# Patient Record
Sex: FEMALE | Race: WHITE | NOT HISPANIC OR LATINO | Employment: OTHER | URBAN - METROPOLITAN AREA
[De-identification: names, ages, dates, MRNs, and addresses within clinical notes are randomized per-mention and may not be internally consistent; named-entity substitution may affect disease eponyms.]

---

## 2019-04-30 ENCOUNTER — OFFICE VISIT (OUTPATIENT)
Dept: GASTROENTEROLOGY | Facility: CLINIC | Age: 79
End: 2019-04-30

## 2019-04-30 VITALS
TEMPERATURE: 98.5 F | DIASTOLIC BLOOD PRESSURE: 66 MMHG | WEIGHT: 109.8 LBS | HEIGHT: 62 IN | BODY MASS INDEX: 20.2 KG/M2 | SYSTOLIC BLOOD PRESSURE: 118 MMHG

## 2019-04-30 DIAGNOSIS — Z87.19 HISTORY OF SMALL BOWEL OBSTRUCTION: ICD-10-CM

## 2019-04-30 DIAGNOSIS — K59.04 CHRONIC IDIOPATHIC CONSTIPATION: Primary | ICD-10-CM

## 2019-04-30 DIAGNOSIS — R14.0 ABDOMINAL BLOATING: ICD-10-CM

## 2019-04-30 PROCEDURE — 99204 OFFICE O/P NEW MOD 45 MIN: CPT | Performed by: INTERNAL MEDICINE

## 2019-04-30 RX ORDER — MULTIPLE VITAMINS W/ MINERALS TAB 9MG-400MCG
1 TAB ORAL DAILY
COMMUNITY

## 2019-04-30 NOTE — PROGRESS NOTES
"Chief Complaint   Patient presents with   • Constipation   • Abdominal Pain       Subjective     HPI    Rosa Salzaar is a 78 y.o. female with a past medical history noted below who presents for evaluation of abdominal pain and constipation.  She reports symptoms present for years.  Varies between predominantly having no BMs to having up to 6 BMs in a day.  She does take bisacodyl and herbal laxative daily.  Constipation has apparently worsened over the past month.  She says she has not \"eliminated in 4 weeks.\"  She has only passed small amounts of stool for the past few weeks and feels incomplete evacuation.  She feels bloated, full, and \"like a rotten pumpkin.\"  Symptoms are associated with poor appetite, generalized abdominal pain.  There is no nausea or vomiting.  She is able to eat hamburgers, pizza, canned peaches however.    She has had multiple surgeries in the past including surgery for intestinal obstruction-- all were done in [place].     2002 she had a colonoscopy and had 2 perforations.  This was in [place].    No family history of colon cancer or colon polyps.    No smoking, no booze. She is retired from teaching.          Past Medical History:   Diagnosis Date   • Basal cell carcinoma 2013, 2014   • Bowel obstruction (CMS/HCC)    • History of nephrolithiasis    • History of small bowel obstruction    • Hypercalcemia    • Hyperparathyroidism (CMS/HCC)    • Kidney stones    • Kidney stones    • Osteopenia    • Thyroid disease          Current Outpatient Medications:   •  acetaminophen (TYLENOL) 500 MG tablet, Take 500 mg by mouth every 6 (six) hours as needed for mild pain (1-3)., Disp: , Rfl:   •  Bisacodyl (LAXATIVE PO), Take 2 capsules by mouth daily. [place] HERBAL LAXATIVE, Disp: , Rfl:   •  Bisacodyl (LAXATIVE PO), Take 5 mg by mouth 2 (two) times a day. EQUATE LAXATIVE BISACODYL, Disp: , Rfl:   •  Multiple Vitamins-Minerals (MULTIVITAMIN WITH MINERALS) tablet tablet, Take 1 tablet by mouth Daily., " Disp: , Rfl:   •  Probiotic Product (PROBIOTIC PO), Take 1 capsule by mouth daily. Multidophilus 12, 20 billion, take 1 capsule daily, Disp: , Rfl:   •  Psyllium (METAMUCIL) 28.3 % powder, Take  by mouth daily. 1 TSP DAILY  , Disp: , Rfl:   •  calcitriol (ROCALTROL) 0.25 MCG capsule, Begin one tablet daily of calcitriol, if you develop symptoms of numbness, tingling, or cramping despite use of calcium supplement tablets, Disp: 30 capsule, Rfl: 0  •  vitamin D (ERGOCALCIFEROL) 66910 UNITS capsule capsule, Take 1 capsule by mouth 1 (One) Time Per Week., Disp: 12 capsule, Rfl: 3    Allergies   Allergen Reactions   • Advil [Ibuprofen] GI Intolerance   • Aspirin GI Intolerance   • Metronidazole GI Intolerance       Social History     Socioeconomic History   • Marital status:      Spouse name: Not on file   • Number of children: Not on file   • Years of education: Not on file   • Highest education level: Not on file   Tobacco Use   • Smoking status: Current Some Day Smoker   • Smokeless tobacco: Never Used   • Tobacco comment: 1 x monthly   Substance and Sexual Activity   • Alcohol use: No     Frequency: Never   • Drug use: No       Family History   Problem Relation Age of Onset   • Heart disease Mother    • Multiple myeloma Father    • Heart disease Father        Review of Systems   Constitutional: Negative for activity change, appetite change and fatigue.   HENT: Negative for sore throat and trouble swallowing.    Respiratory: Negative.    Cardiovascular: Negative.    Gastrointestinal: Positive for abdominal distention, abdominal pain and constipation. Negative for blood in stool.   Endocrine: Negative for cold intolerance and heat intolerance.   Genitourinary: Negative for difficulty urinating, dysuria and frequency.   Musculoskeletal: Negative for arthralgias, back pain and myalgias.   Skin: Negative.    Hematological: Negative for adenopathy. Does not bruise/bleed easily.   All other systems reviewed and are  negative.      Objective     Vitals:    04/30/19 1459   BP: 118/66   Temp: 98.5 °F (36.9 °C)         04/30/19  1459   Weight: 49.8 kg (109 lb 12.8 oz)     Body mass index is 20.08 kg/m².    Physical Exam   Constitutional: She is oriented to person, place, and time. She appears well-developed and well-nourished. No distress.   Appears older than stated age   HENT:   Head: Normocephalic and atraumatic.   Right Ear: External ear normal.   Left Ear: External ear normal.   Nose: Nose normal.   Mouth/Throat: Oropharynx is clear and moist.   Eyes: Conjunctivae and EOM are normal. Right eye exhibits no discharge. Left eye exhibits no discharge. No scleral icterus.   Neck: Normal range of motion. Neck supple. No thyromegaly present.   No supraclavicular adenopathy   Cardiovascular: Normal rate, regular rhythm, normal heart sounds and intact distal pulses. Exam reveals no gallop.   No murmur heard.  No lower extremity edema   Pulmonary/Chest: Effort normal and breath sounds normal. No respiratory distress. She has no wheezes.   Abdominal: Soft. Normal appearance and bowel sounds are normal. She exhibits no distension and no mass. There is no hepatosplenomegaly. There is tenderness. There is no rigidity, no rebound and no guarding. No hernia.   Genitourinary:   Genitourinary Comments: Rectal exam deferred   Musculoskeletal: Normal range of motion. She exhibits no edema or tenderness.   No atrophy of upper or lower extremities.  Normal digits and nails of both hands.   Lymphadenopathy:     She has no cervical adenopathy.   Neurological: She is alert and oriented to person, place, and time. She displays no atrophy. Coordination normal.   Skin: Skin is warm and dry. No rash noted. She is not diaphoretic. No erythema.   Psychiatric: She has a normal mood and affect. Her behavior is normal. Judgment and thought content normal.   Vitals reviewed.      WBC   Date Value Ref Range Status   09/08/2016 3.79 (L) 4.50 - 10.70 10*3/mm3  Final     RBC   Date Value Ref Range Status   09/08/2016 4.81 3.90 - 5.20 10*6/mm3 Final     Hemoglobin   Date Value Ref Range Status   09/08/2016 14.6 11.9 - 15.5 g/dL Final     Hematocrit   Date Value Ref Range Status   09/08/2016 44.1 35.6 - 45.5 % Final     MCV   Date Value Ref Range Status   09/08/2016 91.7 80.5 - 98.2 fL Final     MCH   Date Value Ref Range Status   09/08/2016 30.4 26.9 - 32.7 pg Final     MCHC   Date Value Ref Range Status   09/08/2016 33.1 32.4 - 36.3 g/dL Final     RDW   Date Value Ref Range Status   09/08/2016 13.1 (H) 11.7 - 13.0 % Final     RDW-SD   Date Value Ref Range Status   09/08/2016 43.7 37.0 - 54.0 fl Final     MPV   Date Value Ref Range Status   09/08/2016 10.7 6.0 - 12.0 fL Final     Platelets   Date Value Ref Range Status   09/08/2016 221 140 - 500 10*3/mm3 Final     Neutrophil %   Date Value Ref Range Status   09/08/2016 57.8 42.7 - 76.0 % Final     Lymphocyte %   Date Value Ref Range Status   09/08/2016 27.2 19.6 - 45.3 % Final     Monocyte %   Date Value Ref Range Status   09/08/2016 10.0 5.0 - 12.0 % Final     Eosinophil %   Date Value Ref Range Status   09/08/2016 4.2 0.3 - 6.2 % Final     Basophil %   Date Value Ref Range Status   09/08/2016 0.8 0.0 - 1.5 % Final     Immature Grans %   Date Value Ref Range Status   09/08/2016 0.0 0.0 - 0.5 % Final     Neutrophils, Absolute   Date Value Ref Range Status   09/08/2016 2.19 1.90 - 8.10 10*3/mm3 Final     Lymphocytes, Absolute   Date Value Ref Range Status   09/08/2016 1.03 0.90 - 4.80 10*3/mm3 Final     Monocytes, Absolute   Date Value Ref Range Status   09/08/2016 0.38 0.20 - 1.20 10*3/mm3 Final     Eosinophils, Absolute   Date Value Ref Range Status   09/08/2016 0.16 0.00 - 0.70 10*3/mm3 Final     Basophils, Absolute   Date Value Ref Range Status   09/08/2016 0.03 0.00 - 0.20 10*3/mm3 Final     Immature Grans, Absolute   Date Value Ref Range Status   09/08/2016 0.00 0.00 - 0.03 10*3/mm3 Final       Glucose   Date Value Ref  Range Status   09/08/2016 96 65 - 99 mg/dL Final     Sodium   Date Value Ref Range Status   10/10/2016 143 136 - 145 mmol/L Final   09/08/2016 144 136 - 145 mmol/L Final     Potassium   Date Value Ref Range Status   10/10/2016 4.5 3.5 - 5.2 mmol/L Final   09/08/2016 3.9 3.5 - 5.2 mmol/L Final     CO2   Date Value Ref Range Status   09/08/2016 24.2 22.0 - 29.0 mmol/L Final     Total CO2   Date Value Ref Range Status   10/10/2016 27.0 22.0 - 29.0 mmol/L Final     Chloride   Date Value Ref Range Status   10/10/2016 107 98 - 107 mmol/L Final   09/08/2016 107 98 - 107 mmol/L Final     Anion Gap   Date Value Ref Range Status   09/08/2016 12.8 mmol/L Final     Creatinine   Date Value Ref Range Status   10/10/2016 0.79 0.57 - 1.00 mg/dL Final   09/08/2016 0.79 0.57 - 1.00 mg/dL Final     BUN   Date Value Ref Range Status   10/10/2016 14 8 - 23 mg/dL Final   09/08/2016 14 8 - 23 mg/dL Final     BUN/Creatinine Ratio   Date Value Ref Range Status   10/10/2016 17.7 7.0 - 25.0 Final   09/08/2016 17.7 7.0 - 25.0 Final     Calcium   Date Value Ref Range Status   10/10/2016 9.8 8.6 - 10.5 mg/dL Final   09/13/2016 10.1 8.6 - 10.5 mg/dL Final     eGFR Non  Am   Date Value Ref Range Status   10/10/2016 71 >60 mL/min/1.73 Final     eGFR Non  Amer   Date Value Ref Range Status   09/08/2016 71 >60 mL/min/1.73 Final     Comment:     <15 Indicative of kidney failure.     Alkaline Phosphatase   Date Value Ref Range Status   10/10/2016 126 (H) 39 - 117 U/L Final     ALT (SGPT)   Date Value Ref Range Status   10/10/2016 8 1 - 33 U/L Final     AST (SGOT)   Date Value Ref Range Status   10/10/2016 12 1 - 32 U/L Final     Total Bilirubin   Date Value Ref Range Status   10/10/2016 0.3 0.1 - 1.2 mg/dL Final     Albumin   Date Value Ref Range Status   10/10/2016 4.20 3.50 - 5.20 g/dL Final     A/G Ratio   Date Value Ref Range Status   10/10/2016 2.1 g/dL Final         Imaging Results (last 7 days)     ** No results found for the last  168 hours. **            No notes on file    Assessment/Plan    Constipation: Chronic issue with worsening.  I do wonder if there is traction given her history, however she is not having any vomiting and she is passing flatus and a small amount of stool    History of bowel obstruction: All these were greater than 10 years ago and in [place]    Bloating: Suspect this is related to the constipation but could certainly be liver related with obstruction as well    Plan  CT scan for further evaluation for obstruction  CBC and CMP today  Advised follow-up with her PCP regarding her overall general feeling poorly  Advised adding in twice daily MiraLAX for stool softening line further recommendations after her CT scan    Rosa was seen today for constipation and abdominal pain.    Diagnoses and all orders for this visit:    Chronic idiopathic constipation  -     CT Abdomen Pelvis With Contrast; Future  -     Comprehensive Metabolic Panel  -     CBC & Differential    History of small bowel obstruction  -     CT Abdomen Pelvis With Contrast; Future    Abdominal bloating  -     Comprehensive Metabolic Panel  -     CBC & Differential        I have discussed the above plan with the patient.  They verbalize understanding and are in agreement with the plan.  They have been advised to contact the office for any questions, concerns, or changes related to their health.    Dictated utilizing Dragon dictation

## 2019-04-30 NOTE — PATIENT INSTRUCTIONS
Labs today    Schedule the CT scan    To your PCP    2 doses of miralax twice a day take with allyssade

## 2019-05-01 ENCOUNTER — TELEPHONE (OUTPATIENT)
Dept: GASTROENTEROLOGY | Facility: CLINIC | Age: 79
End: 2019-05-01

## 2019-05-01 DIAGNOSIS — E87.6 HYPOKALEMIA: Primary | ICD-10-CM

## 2019-05-01 LAB
ALBUMIN SERPL-MCNC: 4.1 G/DL (ref 3.5–5.2)
ALBUMIN/GLOB SERPL: 1.5 G/DL
ALP SERPL-CCNC: 117 U/L (ref 39–117)
ALT SERPL-CCNC: 15 U/L (ref 1–33)
AST SERPL-CCNC: 24 U/L (ref 1–32)
BASOPHILS # BLD AUTO: 0.05 10*3/MM3 (ref 0–0.2)
BASOPHILS NFR BLD AUTO: 0.6 % (ref 0–1.5)
BILIRUB SERPL-MCNC: 0.6 MG/DL (ref 0.2–1.2)
BUN SERPL-MCNC: 13 MG/DL (ref 8–23)
BUN/CREAT SERPL: 16.3 (ref 7–25)
CALCIUM SERPL-MCNC: 9.9 MG/DL (ref 8.6–10.5)
CHLORIDE SERPL-SCNC: 98 MMOL/L (ref 98–107)
CO2 SERPL-SCNC: 27.2 MMOL/L (ref 22–29)
CREAT SERPL-MCNC: 0.8 MG/DL (ref 0.57–1)
EOSINOPHIL # BLD AUTO: 0.11 10*3/MM3 (ref 0–0.4)
EOSINOPHIL NFR BLD AUTO: 1.3 % (ref 0.3–6.2)
ERYTHROCYTE [DISTWIDTH] IN BLOOD BY AUTOMATED COUNT: 12.7 % (ref 12.3–15.4)
GLOBULIN SER CALC-MCNC: 2.7 GM/DL
GLUCOSE SERPL-MCNC: 111 MG/DL (ref 65–99)
HCT VFR BLD AUTO: 39.8 % (ref 34–46.6)
HGB BLD-MCNC: 12.7 G/DL (ref 12–15.9)
IMM GRANULOCYTES # BLD AUTO: 0.05 10*3/MM3 (ref 0–0.05)
IMM GRANULOCYTES NFR BLD AUTO: 0.6 % (ref 0–0.5)
LYMPHOCYTES # BLD AUTO: 0.77 10*3/MM3 (ref 0.7–3.1)
LYMPHOCYTES NFR BLD AUTO: 8.8 % (ref 19.6–45.3)
MCH RBC QN AUTO: 29 PG (ref 26.6–33)
MCHC RBC AUTO-ENTMCNC: 31.9 G/DL (ref 31.5–35.7)
MCV RBC AUTO: 90.9 FL (ref 79–97)
MONOCYTES # BLD AUTO: 0.79 10*3/MM3 (ref 0.1–0.9)
MONOCYTES NFR BLD AUTO: 9.1 % (ref 5–12)
NEUTROPHILS # BLD AUTO: 6.95 10*3/MM3 (ref 1.7–7)
NEUTROPHILS NFR BLD AUTO: 79.6 % (ref 42.7–76)
NRBC BLD AUTO-RTO: 0 /100 WBC (ref 0–0.2)
PLATELET # BLD AUTO: 390 10*3/MM3 (ref 140–450)
POTASSIUM SERPL-SCNC: 3.1 MMOL/L (ref 3.5–5.2)
PROT SERPL-MCNC: 6.8 G/DL (ref 6–8.5)
RBC # BLD AUTO: 4.38 10*6/MM3 (ref 3.77–5.28)
SODIUM SERPL-SCNC: 143 MMOL/L (ref 136–145)
WBC # BLD AUTO: 8.72 10*3/MM3 (ref 3.4–10.8)

## 2019-05-01 NOTE — TELEPHONE ENCOUNTER
----- Message from Lisa Palma MD sent at 5/1/2019 11:47 AM EDT -----  Her labs are all normal except for a slightly low potassium.    Please have her eat either a banana daily or 8oz orange juice daily and repeat BMP in ~10 days ( I have placed the order)    Thx

## 2019-05-01 NOTE — TELEPHONE ENCOUNTER
Called pt and advised per Dr Palma that her labs were all normal except for a slightly low potassium.      She recommends she eat either a banana daily or 8 oz oj daily and repeat bmp in 10days.  Pt verb understanding and made lab appt for 05/13 at 10a.

## 2019-05-06 ENCOUNTER — RESULTS ENCOUNTER (OUTPATIENT)
Dept: GASTROENTEROLOGY | Facility: CLINIC | Age: 79
End: 2019-05-06

## 2019-05-06 ENCOUNTER — HOSPITAL ENCOUNTER (OUTPATIENT)
Dept: CT IMAGING | Facility: HOSPITAL | Age: 79
Discharge: HOME | End: 2019-05-06
Admitting: INTERNAL MEDICINE

## 2019-05-06 DIAGNOSIS — K59.04 CHRONIC IDIOPATHIC CONSTIPATION: ICD-10-CM

## 2019-05-06 DIAGNOSIS — E87.6 HYPOKALEMIA: ICD-10-CM

## 2019-05-06 DIAGNOSIS — Z87.19 HISTORY OF SMALL BOWEL OBSTRUCTION: ICD-10-CM

## 2019-05-06 LAB — CREAT BLDA-MCNC: 0.8 MG/DL (ref 0.6–1.3)

## 2019-05-06 PROCEDURE — 74177 CT ABD & PELVIS W/CONTRAST: CPT

## 2019-05-06 PROCEDURE — 25010000002 IOPAMIDOL 61 % SOLUTION: Performed by: INTERNAL MEDICINE

## 2019-05-06 PROCEDURE — 82565 ASSAY OF CREATININE: CPT

## 2019-05-06 PROCEDURE — 0 DIATRIZOATE MEGLUMINE & SODIUM PER 1 ML: Performed by: INTERNAL MEDICINE

## 2019-05-06 RX ADMIN — DIATRIZOATE MEGLUMINE AND DIATRIZOATE SODIUM 30 ML: 660; 100 LIQUID ORAL; RECTAL at 14:29

## 2019-05-06 RX ADMIN — IOPAMIDOL 85 ML: 612 INJECTION, SOLUTION INTRAVENOUS at 14:29

## 2019-05-08 DIAGNOSIS — N13.4 HYDROURETER ON RIGHT: ICD-10-CM

## 2019-05-08 DIAGNOSIS — Z12.11 COLON CANCER SCREENING: Primary | ICD-10-CM

## 2019-05-08 RX ORDER — SODIUM CHLORIDE, SODIUM LACTATE, POTASSIUM CHLORIDE, CALCIUM CHLORIDE 600; 310; 30; 20 MG/100ML; MG/100ML; MG/100ML; MG/100ML
30 INJECTION, SOLUTION INTRAVENOUS CONTINUOUS
Status: CANCELLED | OUTPATIENT
Start: 2019-05-11

## 2019-05-08 NOTE — PROGRESS NOTES
I called and discussed her CT results.  We discussed that she needs a colonoscopy for further evaluation to identify the primary tumor source.    I have put in a request for the colonoscopy––Thuy can you please get her scheduled shortly-- thx    I additionally have placed a urology referral for the hydroureter and obstructive renal calculus

## 2019-05-10 ENCOUNTER — HOSPITAL ENCOUNTER (INPATIENT)
Facility: HOSPITAL | Age: 79
LOS: 10 days | Discharge: HOSPICE | End: 2019-05-20
Attending: EMERGENCY MEDICINE | Admitting: INTERNAL MEDICINE

## 2019-05-10 ENCOUNTER — APPOINTMENT (OUTPATIENT)
Dept: CT IMAGING | Facility: HOSPITAL | Age: 79
End: 2019-05-10

## 2019-05-10 ENCOUNTER — APPOINTMENT (OUTPATIENT)
Dept: GENERAL RADIOLOGY | Facility: HOSPITAL | Age: 79
End: 2019-05-10

## 2019-05-10 DIAGNOSIS — R16.0 LIVER MASSES: ICD-10-CM

## 2019-05-10 DIAGNOSIS — C18.9 MALIGNANT NEOPLASM OF COLON, UNSPECIFIED PART OF COLON (HCC): ICD-10-CM

## 2019-05-10 DIAGNOSIS — Z12.11 COLON CANCER SCREENING: ICD-10-CM

## 2019-05-10 DIAGNOSIS — N20.0 NEPHROLITHIASIS: ICD-10-CM

## 2019-05-10 DIAGNOSIS — R10.9 ABDOMINAL PAIN, UNSPECIFIED ABDOMINAL LOCATION: Primary | ICD-10-CM

## 2019-05-10 DIAGNOSIS — K59.04 CHRONIC IDIOPATHIC CONSTIPATION: ICD-10-CM

## 2019-05-10 DIAGNOSIS — R10.84 GENERALIZED ABDOMINAL PAIN: ICD-10-CM

## 2019-05-10 DIAGNOSIS — C18.9 COLON CANCER (HCC): ICD-10-CM

## 2019-05-10 DIAGNOSIS — C18.4 MALIGNANT NEOPLASM OF TRANSVERSE COLON (HCC): ICD-10-CM

## 2019-05-10 DIAGNOSIS — C78.7 LIVER METASTASES: ICD-10-CM

## 2019-05-10 PROBLEM — E87.6 HYPOKALEMIA: Status: ACTIVE | Noted: 2019-05-10

## 2019-05-10 PROBLEM — K76.9 LIVER LESION: Status: ACTIVE | Noted: 2019-05-10

## 2019-05-10 PROBLEM — E46 MALNUTRITION (HCC): Status: ACTIVE | Noted: 2019-05-10

## 2019-05-10 LAB
ALBUMIN SERPL-MCNC: 3.8 G/DL (ref 3.5–5.2)
ALBUMIN/GLOB SERPL: 1.4 G/DL
ALP SERPL-CCNC: 135 U/L (ref 39–117)
ALT SERPL W P-5'-P-CCNC: 16 U/L (ref 1–33)
ANION GAP SERPL CALCULATED.3IONS-SCNC: 15.3 MMOL/L
AST SERPL-CCNC: 26 U/L (ref 1–32)
BACTERIA UR QL AUTO: ABNORMAL /HPF
BASOPHILS # BLD AUTO: 0.03 10*3/MM3 (ref 0–0.2)
BASOPHILS NFR BLD AUTO: 0.3 % (ref 0–1.5)
BILIRUB SERPL-MCNC: 0.5 MG/DL (ref 0.2–1.2)
BILIRUB UR QL STRIP: NEGATIVE
BUN BLD-MCNC: 13 MG/DL (ref 8–23)
BUN/CREAT SERPL: 15.9 (ref 7–25)
CALCIUM SPEC-SCNC: 9.1 MG/DL (ref 8.6–10.5)
CHLORIDE SERPL-SCNC: 98 MMOL/L (ref 98–107)
CLARITY UR: CLEAR
CO2 SERPL-SCNC: 28.7 MMOL/L (ref 22–29)
COLOR UR: YELLOW
CREAT BLD-MCNC: 0.82 MG/DL (ref 0.57–1)
DEPRECATED RDW RBC AUTO: 41.2 FL (ref 37–54)
EOSINOPHIL # BLD AUTO: 0.2 10*3/MM3 (ref 0–0.4)
EOSINOPHIL NFR BLD AUTO: 2.2 % (ref 0.3–6.2)
ERYTHROCYTE [DISTWIDTH] IN BLOOD BY AUTOMATED COUNT: 12.6 % (ref 12.3–15.4)
GFR SERPL CREATININE-BSD FRML MDRD: 68 ML/MIN/1.73
GLOBULIN UR ELPH-MCNC: 2.8 GM/DL
GLUCOSE BLD-MCNC: 108 MG/DL (ref 65–99)
GLUCOSE UR STRIP-MCNC: NEGATIVE MG/DL
HCT VFR BLD AUTO: 36.9 % (ref 34–46.6)
HGB BLD-MCNC: 11.9 G/DL (ref 12–15.9)
HGB UR QL STRIP.AUTO: NEGATIVE
HOLD SPECIMEN: NORMAL
HOLD SPECIMEN: NORMAL
HYALINE CASTS UR QL AUTO: ABNORMAL /LPF
IMM GRANULOCYTES # BLD AUTO: 0.05 10*3/MM3 (ref 0–0.05)
IMM GRANULOCYTES NFR BLD AUTO: 0.6 % (ref 0–0.5)
INR PPP: 1.03 (ref 0.9–1.1)
KETONES UR QL STRIP: ABNORMAL
LEUKOCYTE ESTERASE UR QL STRIP.AUTO: ABNORMAL
LIPASE SERPL-CCNC: 13 U/L (ref 13–60)
LYMPHOCYTES # BLD AUTO: 1.11 10*3/MM3 (ref 0.7–3.1)
LYMPHOCYTES NFR BLD AUTO: 12.3 % (ref 19.6–45.3)
MAGNESIUM SERPL-MCNC: 2.1 MG/DL (ref 1.6–2.4)
MCH RBC QN AUTO: 29 PG (ref 26.6–33)
MCHC RBC AUTO-ENTMCNC: 32.2 G/DL (ref 31.5–35.7)
MCV RBC AUTO: 90 FL (ref 79–97)
MONOCYTES # BLD AUTO: 1.03 10*3/MM3 (ref 0.1–0.9)
MONOCYTES NFR BLD AUTO: 11.4 % (ref 5–12)
NEUTROPHILS # BLD AUTO: 6.59 10*3/MM3 (ref 1.7–7)
NEUTROPHILS NFR BLD AUTO: 73.2 % (ref 42.7–76)
NITRITE UR QL STRIP: NEGATIVE
NRBC BLD AUTO-RTO: 0 /100 WBC (ref 0–0.2)
PH UR STRIP.AUTO: 5.5 [PH] (ref 5–8)
PLATELET # BLD AUTO: 437 10*3/MM3 (ref 140–450)
PMV BLD AUTO: 10 FL (ref 6–12)
POTASSIUM BLD-SCNC: 3 MMOL/L (ref 3.5–5.2)
PROT SERPL-MCNC: 6.6 G/DL (ref 6–8.5)
PROT UR QL STRIP: NEGATIVE
PROTHROMBIN TIME: 13.2 SECONDS (ref 11.7–14.2)
RBC # BLD AUTO: 4.1 10*6/MM3 (ref 3.77–5.28)
RBC # UR: ABNORMAL /HPF
REF LAB TEST METHOD: ABNORMAL
SODIUM BLD-SCNC: 142 MMOL/L (ref 136–145)
SP GR UR STRIP: 1.02 (ref 1–1.03)
SQUAMOUS #/AREA URNS HPF: ABNORMAL /HPF
TROPONIN T SERPL-MCNC: <0.01 NG/ML (ref 0–0.03)
UROBILINOGEN UR QL STRIP: ABNORMAL
WBC NRBC COR # BLD: 9.01 10*3/MM3 (ref 3.4–10.8)
WBC UR QL AUTO: ABNORMAL /HPF
WHOLE BLOOD HOLD SPECIMEN: NORMAL
WHOLE BLOOD HOLD SPECIMEN: NORMAL

## 2019-05-10 PROCEDURE — 74176 CT ABD & PELVIS W/O CONTRAST: CPT

## 2019-05-10 PROCEDURE — 71046 X-RAY EXAM CHEST 2 VIEWS: CPT

## 2019-05-10 PROCEDURE — 82378 CARCINOEMBRYONIC ANTIGEN: CPT | Performed by: INTERNAL MEDICINE

## 2019-05-10 PROCEDURE — 81001 URINALYSIS AUTO W/SCOPE: CPT | Performed by: EMERGENCY MEDICINE

## 2019-05-10 PROCEDURE — 85025 COMPLETE CBC W/AUTO DIFF WBC: CPT | Performed by: EMERGENCY MEDICINE

## 2019-05-10 PROCEDURE — 99284 EMERGENCY DEPT VISIT MOD MDM: CPT

## 2019-05-10 PROCEDURE — 83690 ASSAY OF LIPASE: CPT | Performed by: EMERGENCY MEDICINE

## 2019-05-10 PROCEDURE — 93010 ELECTROCARDIOGRAM REPORT: CPT | Performed by: INTERNAL MEDICINE

## 2019-05-10 PROCEDURE — 80053 COMPREHEN METABOLIC PANEL: CPT | Performed by: EMERGENCY MEDICINE

## 2019-05-10 PROCEDURE — 93005 ELECTROCARDIOGRAM TRACING: CPT | Performed by: EMERGENCY MEDICINE

## 2019-05-10 PROCEDURE — 83735 ASSAY OF MAGNESIUM: CPT | Performed by: EMERGENCY MEDICINE

## 2019-05-10 PROCEDURE — 85610 PROTHROMBIN TIME: CPT | Performed by: EMERGENCY MEDICINE

## 2019-05-10 PROCEDURE — 99222 1ST HOSP IP/OBS MODERATE 55: CPT | Performed by: INTERNAL MEDICINE

## 2019-05-10 PROCEDURE — 84484 ASSAY OF TROPONIN QUANT: CPT | Performed by: EMERGENCY MEDICINE

## 2019-05-10 RX ORDER — SODIUM CHLORIDE 0.9 % (FLUSH) 0.9 %
3-10 SYRINGE (ML) INJECTION AS NEEDED
Status: DISCONTINUED | OUTPATIENT
Start: 2019-05-10 | End: 2019-05-20 | Stop reason: HOSPADM

## 2019-05-10 RX ORDER — ONDANSETRON 2 MG/ML
4 INJECTION INTRAMUSCULAR; INTRAVENOUS EVERY 6 HOURS PRN
Status: DISCONTINUED | OUTPATIENT
Start: 2019-05-10 | End: 2019-05-15

## 2019-05-10 RX ORDER — SODIUM CHLORIDE 0.9 % (FLUSH) 0.9 %
3 SYRINGE (ML) INJECTION EVERY 12 HOURS SCHEDULED
Status: DISCONTINUED | OUTPATIENT
Start: 2019-05-10 | End: 2019-05-20 | Stop reason: HOSPADM

## 2019-05-10 RX ORDER — POLYETHYLENE GLYCOL 3350, SODIUM CHLORIDE, POTASSIUM CHLORIDE, SODIUM BICARBONATE, AND SODIUM SULFATE 240; 5.84; 2.98; 6.72; 22.72 G/4L; G/4L; G/4L; G/4L; G/4L
2000 POWDER, FOR SOLUTION ORAL 2 TIMES DAILY
Status: COMPLETED | OUTPATIENT
Start: 2019-05-10 | End: 2019-05-11

## 2019-05-10 RX ORDER — ACETAMINOPHEN 500 MG
500 TABLET ORAL EVERY 6 HOURS PRN
Status: DISCONTINUED | OUTPATIENT
Start: 2019-05-10 | End: 2019-05-20 | Stop reason: HOSPADM

## 2019-05-10 RX ORDER — POTASSIUM CHLORIDE 1.5 G/1.77G
40 POWDER, FOR SOLUTION ORAL AS NEEDED
Status: DISCONTINUED | OUTPATIENT
Start: 2019-05-10 | End: 2019-05-20 | Stop reason: HOSPADM

## 2019-05-10 RX ORDER — POTASSIUM CHLORIDE 7.45 MG/ML
10 INJECTION INTRAVENOUS
Status: DISCONTINUED | OUTPATIENT
Start: 2019-05-10 | End: 2019-05-20 | Stop reason: HOSPADM

## 2019-05-10 RX ORDER — L.ACID,PARA/B.BIFIDUM/S.THERM 8B CELL
1 CAPSULE ORAL DAILY
Status: DISCONTINUED | OUTPATIENT
Start: 2019-05-10 | End: 2019-05-20 | Stop reason: HOSPADM

## 2019-05-10 RX ORDER — POTASSIUM CHLORIDE 750 MG/1
40 CAPSULE, EXTENDED RELEASE ORAL AS NEEDED
Status: DISCONTINUED | OUTPATIENT
Start: 2019-05-10 | End: 2019-05-20 | Stop reason: HOSPADM

## 2019-05-10 RX ORDER — MULTIPLE VITAMINS W/ MINERALS TAB 9MG-400MCG
1 TAB ORAL DAILY
Status: DISCONTINUED | OUTPATIENT
Start: 2019-05-10 | End: 2019-05-20 | Stop reason: HOSPADM

## 2019-05-10 RX ORDER — SODIUM CHLORIDE 0.9 % (FLUSH) 0.9 %
10 SYRINGE (ML) INJECTION AS NEEDED
Status: DISCONTINUED | OUTPATIENT
Start: 2019-05-10 | End: 2019-05-20 | Stop reason: HOSPADM

## 2019-05-10 RX ORDER — BISACODYL 5 MG/1
5 TABLET, DELAYED RELEASE ORAL DAILY PRN
Status: DISCONTINUED | OUTPATIENT
Start: 2019-05-10 | End: 2019-05-20 | Stop reason: HOSPADM

## 2019-05-10 RX ORDER — POTASSIUM CHLORIDE 750 MG/1
40 CAPSULE, EXTENDED RELEASE ORAL ONCE
Status: COMPLETED | OUTPATIENT
Start: 2019-05-10 | End: 2019-05-10

## 2019-05-10 RX ADMIN — POLYETHYLENE GLYCOL-3350 AND ELECTROLYTES WITH FLAVOR PACK 2000 ML: 240; 5.84; 2.98; 6.72; 22.72 POWDER, FOR SOLUTION ORAL at 20:06

## 2019-05-10 RX ADMIN — POTASSIUM CHLORIDE 40 MEQ: 750 CAPSULE, EXTENDED RELEASE ORAL at 13:04

## 2019-05-10 RX ADMIN — SODIUM CHLORIDE, PRESERVATIVE FREE 3 ML: 5 INJECTION INTRAVENOUS at 20:06

## 2019-05-10 NOTE — ED PROVIDER NOTES
EMERGENCY DEPARTMENT ENCOUNTER    CHIEF COMPLAINT  Chief Complaint: multiple complaints.  History given by: patient  History limited by: nothing  Room Number: P696/1  PMD: Provider, No Known      HPI:  Pt is a 78 y.o. female who presents for further evaluation.  She has had the sensation of constipation and abdominal distension for the past several weeks. She explains that she only passes a small amount of liquid stool when she passes gas. She states that she has also had a lack of appetite but she has been thirsty and has been drinking fluids. Pt states that she has been becoming very weak due to the lack of PO intake. Pt also has had chills. She denies urinary symptoms, rashes, weight loss, Pt was sent here by Dr. Palma. She had a recent CT abd/pelvis that showed multiple liver lesions consistent with metastatic disease. Dr Palma was concerned that it may be coming from her colon and she needed a colonoscopy.     Duration:  Several weeks  Onset: gradual  Timing: constant  Location: abdomen  Radiation: n/a  Quality: sensation of constipation, abdominal distension  Intensity/Severity: moderate  Progression: worsening  Associated Symptoms: generalized weakness, chills, lack of appetite  Aggravating Factors: none  Alleviating Factors: none  Previous Episodes: none  Treatment before arrival: Pt had a CT abd/pelvis that shows lesions on liver consistent with metastatic disease.     PAST MEDICAL HISTORY  Active Ambulatory Problems     Diagnosis Date Noted   • Hypovitaminosis D 09/07/2016   • Elevated alkaline phosphatase level 09/07/2016   • History of small bowel obstruction 04/30/2019   • Chronic idiopathic constipation 04/30/2019   • Abdominal bloating 04/30/2019     Resolved Ambulatory Problems     Diagnosis Date Noted   • Parathyroid adenoma 09/07/2016   • Hypercalcemia 09/07/2016   • Hyperparathyroidism, primary (CMS/HCC) 09/14/2016     Past Medical History:   Diagnosis Date   • Basal cell carcinoma 2013, 2014    • Bowel obstruction (CMS/HCC)    • History of nephrolithiasis    • History of small bowel obstruction    • Hypercalcemia    • Hyperparathyroidism (CMS/HCC)    • Kidney stones    • Kidney stones    • Osteopenia    • Thyroid disease        PAST SURGICAL HISTORY  Past Surgical History:   Procedure Laterality Date   • APPENDECTOMY  1947   • BLADDER REPAIR  2009   • COLON SURGERY  2002    PERFORATED COLON (2 SPOTS OF COLON)   • COLON SURGERY  2004    OBSTRUCTION   • COLONOSCOPY  01/2002    colon perforation  [place]   • HERNIA REPAIR  2009    x2   • INTESTINAL MALROTATION REPAIR  2001    TWISTED INTESTINE    • KIDNEY STONE SURGERY  2004   • OOPHORECTOMY     • SKIN CANCER EXCISION  03/24/2014    Excision basal cell carcinoma, right forehead (0.8cm) with frozen section control and layered wound closure (2.2cm)-Dr. Stuart Bajwa   • SKIN CANCER EXCISION  11/19/2013    Excision basal cell carcinoma, biopsy site upper mid forehead (0.6cm) with frozen section control and layered wound closures (1.2cm)-Dr. Stuart Huang   • THYROIDECTOMY Right 9/8/2016    Procedure: RIGHT SUPERIOR PARATHYROID ADENOMA RESECTION, RIGHT INFERIOR PARATHYROID BX;  Surgeon: Xin Melissa MD;  Location: [facility] OR [facility];  Service:    • TOTAL ABDOMINAL HYSTERECTOMY  1978    X2 SURGERIES        FAMILY HISTORY  Family History   Problem Relation Age of Onset   • Heart disease Mother    • Multiple myeloma Father    • Heart disease Father        SOCIAL HISTORY  Social History     Socioeconomic History   • Marital status:      Spouse name: Not on file   • Number of children: Not on file   • Years of education: Not on file   • Highest education level: Not on file   Tobacco Use   • Smoking status: Current Some Day Smoker   • Smokeless tobacco: Never Used   • Tobacco comment: 1 x monthly   Substance and Sexual Activity   • Alcohol use: No     Frequency: Never   • Drug use: No       ALLERGIES  Advil [ibuprofen]; Aspirin; and Metronidazole    REVIEW OF  SYSTEMS  Review of Systems   Constitutional: Positive for appetite change (decreased) and chills. Negative for fever.   HENT: Negative for sore throat.    Eyes: Negative.    Respiratory: Negative for cough and shortness of breath.    Cardiovascular: Negative for chest pain.   Gastrointestinal: Positive for abdominal distention and constipation. Negative for abdominal pain, diarrhea and vomiting.   Genitourinary: Negative for dysuria.   Musculoskeletal: Negative for neck pain.   Skin: Negative for rash.   Neurological: Positive for weakness (generalized). Negative for numbness and headaches.   Hematological: Negative.    Psychiatric/Behavioral: Negative.    All other systems reviewed and are negative.      PHYSICAL EXAM  ED Triage Vitals   Temp Heart Rate Resp BP SpO2   05/10/19 1025 05/10/19 1025 05/10/19 1025 05/10/19 1041 05/10/19 1025   98.9 °F (37.2 °C) 96 18 142/72 99 %      Temp src Heart Rate Source Patient Position BP Location FiO2 (%)   05/10/19 1025 -- -- -- --   Tympanic           Physical Exam   Constitutional: She is oriented to person, place, and time. No distress.   HENT:   Head: Normocephalic and atraumatic.   Eyes: EOM are normal. Pupils are equal, round, and reactive to light.   Neck: Normal range of motion. Neck supple.   Cardiovascular: Normal rate, regular rhythm, normal heart sounds and intact distal pulses.   No murmur heard.  No BLE edema   Pulmonary/Chest: Effort normal and breath sounds normal. No respiratory distress.   CTAB   Abdominal: Soft. Bowel sounds are normal. She exhibits no distension. There is no tenderness. There is no rebound, no guarding and no CVA tenderness.   Multiple scars to her abdomen   Musculoskeletal: Normal range of motion. She exhibits no edema.   Neurological: She is alert and oriented to person, place, and time. She has normal sensation and normal strength.   Skin: Skin is warm and dry. No rash noted.   Psychiatric: Mood and affect normal.   Nursing note and  vitals reviewed.      LAB RESULTS  Lab Results (last 24 hours)     Procedure Component Value Units Date/Time    CBC & Differential [[id number]] Collected:  05/10/19 1046    Specimen:  Blood Updated:  05/10/19 1128    Narrative:       The following orders were created for panel order CBC & Differential.  Procedure                               Abnormality         Status                     ---------                               -----------         ------                     CBC Auto Differential[[id number]]        Abnormal            Final result                 Please view results for these tests on the individual orders.    Comprehensive Metabolic Panel [[id number]]  (Abnormal) Collected:  05/10/19 1046    Specimen:  Blood Updated:  05/10/19 1143     Glucose 108 mg/dL      BUN 13 mg/dL      Creatinine 0.82 mg/dL      Sodium 142 mmol/L      Potassium 3.0 mmol/L      Chloride 98 mmol/L      CO2 28.7 mmol/L      Calcium 9.1 mg/dL      Total Protein 6.6 g/dL      Albumin 3.80 g/dL      ALT (SGPT) 16 U/L      AST (SGOT) 26 U/L      Alkaline Phosphatase 135 U/L      Total Bilirubin 0.5 mg/dL      eGFR Non African Amer 68 mL/min/1.73      Globulin 2.8 gm/dL      A/G Ratio 1.4 g/dL      BUN/Creatinine Ratio 15.9     Anion Gap 15.3 mmol/L     Narrative:       GFR Normal >60  Chronic Kidney Disease <60  Kidney Failure <15    Lipase [[id number]]  (Normal) Collected:  05/10/19 1046    Specimen:  Blood Updated:  05/10/19 1143     Lipase 13 U/L     Magnesium [[id number]]  (Normal) Collected:  05/10/19 1046    Specimen:  Blood Updated:  05/10/19 1143     Magnesium 2.1 mg/dL     Troponin [[id number]]  (Normal) Collected:  05/10/19 1046    Specimen:  Blood Updated:  05/10/19 1143     Troponin T <0.010 ng/mL     Narrative:       Troponin T Reference Range:  <= 0.03 ng/mL-   Negative for AMI  >0.03 ng/mL-     Abnormal for myocardial necrosis.  Clinicians would have to utilize clinical acumen, EKG, Troponin and serial changes to determine  if it is an Acute Myocardial Infarction or myocardial injury due to an underlying chronic condition.     Protime-INR [[id number]]  (Normal) Collected:  05/10/19 1046    Specimen:  Blood Updated:  05/10/19 1131     Protime 13.2 Seconds      INR 1.03    CBC Auto Differential [[id number]]  (Abnormal) Collected:  05/10/19 1046    Specimen:  Blood Updated:  05/10/19 1128     WBC 9.01 10*3/mm3      RBC 4.10 10*6/mm3      Hemoglobin 11.9 g/dL      Hematocrit 36.9 %      MCV 90.0 fL      MCH 29.0 pg      MCHC 32.2 g/dL      RDW 12.6 %      RDW-SD 41.2 fl      MPV 10.0 fL      Platelets 437 10*3/mm3      Neutrophil % 73.2 %      Lymphocyte % 12.3 %      Monocyte % 11.4 %      Eosinophil % 2.2 %      Basophil % 0.3 %      Immature Grans % 0.6 %      Neutrophils, Absolute 6.59 10*3/mm3      Lymphocytes, Absolute 1.11 10*3/mm3      Monocytes, Absolute 1.03 10*3/mm3      Eosinophils, Absolute 0.20 10*3/mm3      Basophils, Absolute 0.03 10*3/mm3      Immature Grans, Absolute 0.05 10*3/mm3      nRBC 0.0 /100 WBC           I ordered the above labs and reviewed the results    RADIOLOGY  CT Abdomen Pelvis Without Contrast   Final Result      XR Chest 2 View   Final Result   No evidence for active disease in the chest.       This report was finalized on 5/10/2019 12:25 PM by Dr. Raj Slater M.D.               I ordered the above noted radiological studies. Interpreted by radiologist. Discussed with radiologist (Dr. Canas). Reviewed by me in PACS.       PROCEDURES  Procedures  EKG          EKG time: 1126  Rhythm/Rate: 68, SR  P waves and GA: normal  QRS, axis: narrow complex, normal axis   ST and T waves: diffuse nonspecific ST and T wave changes, subtle Inferior lateral ST depression in multiple leads.  Normal QT     Interpreted Contemporaneously by me, independently viewed  No prior for comparison      PROGRESS AND CONSULTS       1108  Ordered labs, CXR, CT abd/pelvis, UA, and EKG for further evaluation. Placed call to GI  for consult.     1210  Discussed Pt's case with Dr. Palma (GI) who agrees with plan for admission.     1247  Placed call to [facility] for admission. Ordered potassium to treat.      1251  Rechecked Pt who is resting comfortably. Informed pt that her CT scan is unchanged. Her potassium was slightly low so I gave her oral potassium here. Discussed plans for admission for further workup. Pt understands and agrees to all plans. All questions answered.     1258  Discussed Pt's case with Meghan NP for Dr. Olivia ([facility]), who agrees to admit Pt to [facility] for further evaluation and treatment.     MEDICAL DECISION MAKING  Results were reviewed/discussed with the patient and they were also made aware of online access. Pt also made aware that some labs, such as cultures, will not be resulted during ER visit and follow up with PMD is necessary.     MDM  Number of Diagnoses or Management Options     Amount and/or Complexity of Data Reviewed  Clinical lab tests: ordered and reviewed (Potassium=3.0)  Tests in the radiology section of CPT®: reviewed and ordered (CXR shows nothing acute. CT abd/pelvis is unchanged from previous scan one week ago.)  Tests in the medicine section of CPT®: reviewed and ordered (See EKG note.)  Discussion of test results with the performing providers: yes (Dr. Canas (radiology))  Decide to obtain previous medical records or to obtain history from someone other than the patient: yes  Review and summarize past medical records: yes (Reviewed Dr. Palma' most recent notes regarding Pt. She reviewed Pt's CT abd/pelvis from 5/6/19. She has multiple masses of her liver consistent with hepatic metastatic liver disease that were new compared to CT from 9/14/19. She also had gallstones seen on recent CT. She has a large 12x8mm calculus at right uretero-pelvis junction with moderate hydronephrosis. She spoke with Dr. Palma today who sent her here for further evaluation. )  Discuss the patient with other  providers: yes           DIAGNOSIS  Final diagnoses:   Abdominal pain, unspecified abdominal location   Liver masses   Nephrolithiasis       DISPOSITION  ADMISSION    Discussed treatment plan and reason for admission with pt/family and admitting physician.  Pt/family voiced understanding of the plan for admission for further testing/treatment as needed.       Latest Documented Vital Signs:  As of 4:00 PM  BP- 129/69 HR- 91 Temp- 98.7 °F (37.1 °C) (Oral) O2 sat- 97%    --  Documentation assistance provided by mattie Drummond for Dr. Najera.  Information recorded by the scribe was done at my direction and has been verified and validated by me.        Jenn Drummond  05/10/19 1302       Gennaro Najera MD  05/10/19 1600

## 2019-05-10 NOTE — CONSULTS
"[facility]  Initial Inpatient Consult Note    Referring Provider: Dr Dickens    Reason for Consultation: constipation, liver mets    Subjective     History of present illness: 78-year-old woman with a past medical history as below admitted to the ER with progressive symptoms of weakness.  She is known to me from the outpatient setting and I saw her for the first time on April 30 for various GI complaints including poor appetite, abdominal pain and constipation.  Symptoms have been ongoing for years with worsening over the past few months.  We proceeded with CT imaging as part of her work-up as she was opposed to colonoscopy given a history of prior perforation; unfortunately the CT scan showed numerous liver metastases with no obvious primary source identified.  Additionally there was an obstructive ureteropelvic junction calculus causing hydronephrosis.  Our plans at that time were to proceed with a colonoscopy and urology consult for treatment of the obstructive calculus.  However, over the past few days, she says her symptoms have continued.  She says that she ate nothing at all yesterday and only had a bite of toast this morning.  She has felt progressive weakness and was concerned enough that she wanted to come to the emergency room to \"speed things up\" in terms of her medical care.  She persists with constipation since she has not had a bowel movement in a few days as well as generalized vague abdominal pain.    ER work-up included a repeat CT scan again demonstrating her liver mets and obstructive calculus.  She was found to be hypokalemic with potassium of 3.  Chest x-ray did not show any lung masses.    Past Medical History:  Past Medical History:   Diagnosis Date   • Basal cell carcinoma 2013, 2014   • Bowel obstruction (CMS/HCC)    • History of nephrolithiasis    • History of small bowel obstruction    • Hypercalcemia    • Hyperparathyroidism (CMS/HCC)    • Kidney stones    • " Kidney stones    • Osteopenia    • Thyroid disease        Past Surgical History:  Past Surgical History:   Procedure Laterality Date   • APPENDECTOMY  1947   • BLADDER REPAIR  2009   • COLON SURGERY  2002    PERFORATED COLON (2 SPOTS OF COLON)   • COLON SURGERY  2004    OBSTRUCTION   • COLONOSCOPY  01/2002    colon perforation  [place]   • HERNIA REPAIR  2009    x2   • INTESTINAL MALROTATION REPAIR  2001    TWISTED INTESTINE    • KIDNEY STONE SURGERY  2004   • OOPHORECTOMY     • SKIN CANCER EXCISION  03/24/2014    Excision basal cell carcinoma, right forehead (0.8cm) with frozen section control and layered wound closure (2.2cm)-Dr. Stuart Bajwa   • SKIN CANCER EXCISION  11/19/2013    Excision basal cell carcinoma, biopsy site upper mid forehead (0.6cm) with frozen section control and layered wound closures (1.2cm)-Dr. Stuart Huang   • THYROIDECTOMY Right 9/8/2016    Procedure: RIGHT SUPERIOR PARATHYROID ADENOMA RESECTION, RIGHT INFERIOR PARATHYROID BX;  Surgeon: Xin Melissa MD;  [facility] [place];  Service:    • TOTAL ABDOMINAL HYSTERECTOMY  1978    X2 SURGERIES         Social History:   Social History     Tobacco Use   • Smoking status: Current Some Day Smoker   • Smokeless tobacco: Never Used   • Tobacco comment: 1 x monthly   Substance Use Topics   • Alcohol use: No     Frequency: Never        Family History:  Family History   Problem Relation Age of Onset   • Heart disease Mother    • Multiple myeloma Father    • Heart disease Father        Home Meds:  Medications Prior to Admission   Medication Sig Dispense Refill Last Dose   • acetaminophen (TYLENOL) 500 MG tablet Take 500 mg by mouth every 6 (six) hours as needed for mild pain (1-3).   Taking   • Bisacodyl (LAXATIVE PO) Take 2 capsules by mouth daily. SWISSKISS HERBAL LAXATIVE   Taking   • Bisacodyl (LAXATIVE PO) Take 5 mg by mouth 2 (two) times a day. EQUATE LAXATIVE BISACODYL   Taking   • calcitriol (ROCALTROL) 0.25 MCG capsule Begin one tablet daily of  calcitriol, if you develop symptoms of numbness, tingling, or cramping despite use of calcium supplement tablets 30 capsule 0 Not Taking   • Multiple Vitamins-Minerals (MULTIVITAMIN WITH MINERALS) tablet tablet Take 1 tablet by mouth Daily.   Taking   • Probiotic Product (PROBIOTIC PO) Take 1 capsule by mouth daily. Multidophilus 12, 20 billion, take 1 capsule daily   Taking   • Psyllium (METAMUCIL) 28.3 % powder Take  by mouth daily. 1 TSP DAILY      Taking   • vitamin D (ERGOCALCIFEROL) 56314 UNITS capsule capsule Take 1 capsule by mouth 1 (One) Time Per Week. 12 capsule 3 Not Taking       Current Meds:     lactobacillus acidophilus 1 capsule Oral Daily   multivitamin with minerals 1 tablet Oral Daily   sodium chloride 3 mL Intravenous Q12H       Allergies:  Allergies   Allergen Reactions   • Advil [Ibuprofen] GI Intolerance   • Aspirin GI Intolerance   • Metronidazole GI Intolerance       Review of Systems  All systems were reviewed and negative except for:  Constitution:  positive for malaise  Gastrointestinal: positive for  constipation and pain     Objective     Vital Signs  Temp:  [98.7 °F (37.1 °C)-98.9 °F (37.2 °C)] 98.7 °F (37.1 °C)  Heart Rate:  [62-96] 91  Resp:  [16-18] 16  BP: (123-142)/(32-72) 129/69    Physical Exam:  Constitutional:    Alert, cooperative, in no acute distress, appears stated age   Eyes:            Lids and lashes normal, conjunctivae and sclerae normal, no   icterus   Ears, nose, mouth and throat:   Normal appearance of external ears and nose, no oral lesions, no thrush, oral mucosa moist   Respiratory:     Clear to auscultation, respirations regular, even and                   unlabored    Cardiovascular:    Regular rhythm and normal rate, normal S1 and S2, no            murmur, no gallop, palpable distal pulses, no lower extremity edema   Gastrointestinal:    Soft, non-distended, non-tender to palpation, no guarding, no rebound tenderness, normal bowel sounds, no palpable masses  or organomegaly  Rectal exam: deferred   Musculoskeletal:   Normal station, no atrophy, no tenderness to palpation, normal digits and nails   Skin:   Normal color, no bleeding, bruising, rashes or lesions   Lymphatics:   No palpable cervical or supraclavicular adenopathy   Psychiatric:  Judgement and insight: normal   Orientation to person, place and time: normal   Mood and affect: normal       Results Review:   I reviewed the patient's new clinical results.    Results from last 7 days   Lab Units 05/10/19  1046   WBC 10*3/mm3 9.01   HEMOGLOBIN g/dL 11.9*   HEMATOCRIT % 36.9   PLATELETS 10*3/mm3 437       Results from last 7 days   Lab Units 05/10/19  1046 05/06/19  1411   SODIUM mmol/L 142  --    POTASSIUM mmol/L 3.0*  --    CHLORIDE mmol/L 98  --    CO2 mmol/L 28.7  --    BUN mg/dL 13  --    CREATININE mg/dL 0.82 0.80   CALCIUM mg/dL 9.1  --    BILIRUBIN mg/dL 0.5  --    ALK PHOS U/L 135*  --    ALT (SGPT) U/L 16  --    AST (SGOT) U/L 26  --    GLUCOSE mg/dL 108*  --        Results from last 7 days   Lab Units 05/10/19  1046   INR  1.03       Lab Results   Lab Value Date/Time    LIPASE 13 05/10/2019 1046       Radiology:  Imaging Results (last 72 hours)     Procedure Component Value Units Date/Time    CT Abdomen Pelvis Without Contrast [[id number]] Collected:  05/10/19 1327     Updated:  05/10/19 1358    Narrative:       CT SCAN OF THE ABDOMEN AND PELVIS WITHOUT CONTRAST     HISTORY: Probable liver metastases noted on recent CT scan. Large stone  in right renal pelvis with obstruction. Abdominal pain and constipation.     The CT scan was performed as an emergency procedure through the abdomen  and pelvis without contrast and compared to the recent enhanced CT scans  of the abdomen and pelvis performed 4 days ago on 05/06/2019. The  following findings are present:  1. Again noted are multiple low-density lesions scattered throughout the  liver consistent with extensive metastatic disease and these measure up  to 2.6  cm in size. One of these could be biopsied under CT guidance for  diagnosis if necessary.  2. The lung bases are clear. The spleen, pancreas, both adrenal glands  are unremarkable. There are multiple small gallstones within the  gallbladder.  3. There is a large ovoid stone in the right renal pelvis measuring up  to 13 mm and causing mild-to-moderate right renal obstruction that is  unchanged from 4 days ago. There are several smaller nonobstructing  stones scattered in the right kidney. The left kidney is unremarkable  except for a small cortical cyst.  4. There is no aortic aneurysm or retroperitoneal lymphadenopathy. The  large and small bowel loops are normal in caliber and show no  inflammatory change. No abnormality is seen in the pelvis.  5. At bone windows, no suspicious bone lesions are seen.           Radiation dose reduction techniques were utilized, including automated  exposure control and exposure modulation based on body size.     This report was finalized on 5/10/2019 1:55 PM by Dr. Shimon Canas M.D.       XR Chest 2 View [[id number]] Collected:  05/10/19 1216     Updated:  05/10/19 1228    Narrative:       PA AND LATERAL CHEST     HISTORY: Abdominal distention and bloating for 5 to 6 weeks.     COMPARISON: None.     FINDINGS: The cardiomediastinal silhouette is within normal limits.  Lungs appear clear and there is no evidence for pulmonary edema or  pleural effusion or infiltrate. Surgical clips overlie the right lower  neck/thyroid. There is a minor scoliotic curvature of the thoracic  spine.       Impression:       No evidence for active disease in the chest.     This report was finalized on 5/10/2019 12:25 PM by Dr. Raj Slater M.D.             Assessment/Plan       Abdominal pain    Malnutrition (CMS/HCC)    Liver lesion    Kidney stone    Hypokalemia      Impression  1.  Liver mets with unknown primary    2.  Chronic constipation    3.  Anorexia    4.  Obstructive right renal pelvis  calculus    5.  Weakness, fatigue    Plan  Plan for colonoscopy with Dr. Bethea tomorrow.  Clear liquids today, n.p.o. at midnight.  I am suspicious of a colon primary    Urology consult for hydroureter, obstructive R kidney stone    If nothing is found on her colonoscopy, then we discussed that she is going to need to have a biopsy of 1 of the liver mets    Potassium replacement as per primary    Further recommendations following endoscopy      I discussed the patients findings and my recommendations with patient and nursing staff    Lisa Palma MD  [facility]      Dictated utilizing Dragon dictation

## 2019-05-10 NOTE — H&P
History and Physical    Patient Name: Rosa Salazar  Age/Sex: 78 y.o. female  : 1940  MRN: [id number]    Date of Admission: 5/10/2019  Date of Encounter Visit: 05/10/19  Encounter Provider: CALVIN Hamilton  Place of Service: [facility]  Patient Care Team:  Provider, No Known as PCP - Chelsi Hernandez PA as PCP - Claims Attributed    Subjective:     Chief Complaint:   Chief Complaint   Patient presents with   • Abdominal Pain       History of Present Illness  Rosa Salazar is a 78 y.o. female with a history of hyperparathyroidism, basal cell carcinoma, osteopenia and recurrent small bowel obstruction s/p multiple abdominal sugeries. She was seen by Dr. Palma recently due to complaints of constipation and difficulty having stools. She was found to have mutliple liver lesions suggestive of metastatic disease noted on CT of the abdomen and is scheduled to have a colonoscopy.     Patient presented with complaints of generalized abdominal pain that started about a month ago and was associated with diarrhea, decreased appetite, nausea and increased flatulence. She was encouraged to come the the ER for further evaluation given recent abnormal CT and poor oral intake. Patient reports she has only eaten a banana in the past week, but does drink Gatorade and other fluids.reports she only had a bowel movement when she passes gas and it a small about of liquid. Denies a normal bowel movement in the past month despite taking laxatives. Denies any fever, chills, sick contacts, chest pain, shortness of breath, bone pain or dysuria. Denies any tobacco use, alcohol use or family history of cancer.     Review of Systems   Constitutional: Positive for appetite change and fatigue. Negative for chills and fever.   HENT: Negative for congestion and trouble swallowing.    Eyes: Negative for visual disturbance.   Respiratory: Negative for cough, shortness of breath and wheezing.    Cardiovascular:  Negative for chest pain, palpitations and leg swelling.   Gastrointestinal: Positive for abdominal pain, constipation, diarrhea and nausea. Negative for blood in stool and vomiting.   Genitourinary: Negative for difficulty urinating, dysuria, frequency and urgency.   Musculoskeletal: Negative for arthralgias and back pain.   Neurological: Positive for weakness. Negative for dizziness and syncope.   Psychiatric/Behavioral: Negative for confusion. The patient is not nervous/anxious.    All other systems reviewed and are negative.       Past Medical and Surgical History:  Past Medical History:   Diagnosis Date   • Basal cell carcinoma 2013, 2014   • Bowel obstruction (CMS/HCC)    • History of nephrolithiasis    • History of small bowel obstruction    • Hypercalcemia    • Hyperparathyroidism (CMS/HCC)    • Kidney stones    • Kidney stones    • Osteopenia    • Thyroid disease        Past Surgical History:   Procedure Laterality Date   • APPENDECTOMY  1947   • BLADDER REPAIR  2009   • COLON SURGERY  2002    PERFORATED COLON (2 SPOTS OF COLON)   • COLON SURGERY  2004    OBSTRUCTION   • COLONOSCOPY  01/2002    colon perforation  [place]   • HERNIA REPAIR  2009    x2   • INTESTINAL MALROTATION REPAIR  2001    TWISTED INTESTINE    • KIDNEY STONE SURGERY  2004   • OOPHORECTOMY     • SKIN CANCER EXCISION  03/24/2014    Excision basal cell carcinoma, right forehead (0.8cm) with frozen section control and layered wound closure (2.2cm)-Dr. Stuart Bajwa   • SKIN CANCER EXCISION  11/19/2013    Excision basal cell carcinoma, biopsy site upper mid forehead (0.6cm) with frozen section control and layered wound closures (1.2cm)-Dr. Stuart Huang   • THYROIDECTOMY Right 9/8/2016    Procedure: RIGHT SUPERIOR PARATHYROID ADENOMA RESECTION, RIGHT INFERIOR PARATHYROID BX;  Surgeon: Xin Melissa MD;  [place] [facility] [place] [facility] [place]  Service:    • TOTAL ABDOMINAL HYSTERECTOMY  1978    X2 SURGERIES        Home Medications:     (Not in a hospital  admission)    Inpatient Medications:  Scheduled Meds:   Continuous Infusions:   PRN Meds:.•  [COMPLETED] Insert peripheral IV **AND** sodium chloride    Allergies:  Allergies   Allergen Reactions   • Advil [Ibuprofen] GI Intolerance   • Aspirin GI Intolerance   • Metronidazole GI Intolerance       Past Social History:  Social History     Socioeconomic History   • Marital status:      Spouse name: Not on file   • Number of children: Not on file   • Years of education: Not on file   • Highest education level: Not on file   Tobacco Use   • Smoking status: Current Some Day Smoker   • Smokeless tobacco: Never Used   • Tobacco comment: 1 x monthly   Substance and Sexual Activity   • Alcohol use: No     Frequency: Never   • Drug use: No       Past Family History:  Family History   Problem Relation Age of Onset   • Heart disease Mother    • Multiple myeloma Father    • Heart disease Father        Objective:   Temp:  [98.9 °F (37.2 °C)] 98.9 °F (37.2 °C)  Heart Rate:  [62-96] 73  Resp:  [16-18] 16  BP: (123-142)/(32-72) 123/60   SpO2:  [97 %-99 %] 97 %  on    Device (Oxygen Therapy): room air  No intake or output data in the 24 hours ending 05/10/19 1448  Body mass index is 21.22 kg/m².      05/10/19  1041   Weight: 52.6 kg (116 lb)     Weight change:     Physical Exam   Constitutional: She is oriented to person, place, and time. She appears well-developed and well-nourished. No distress.   Thin, frail and ill appearing   HENT:   Head: Normocephalic and atraumatic.   Eyes: Conjunctivae are normal. Pupils are equal, round, and reactive to light. No scleral icterus.   Neck: Neck supple. No tracheal deviation present.   Cardiovascular: Normal rate, regular rhythm and normal heart sounds.   No murmur heard.  Pulmonary/Chest: Effort normal and breath sounds normal. She has no wheezes. She has no rales.   Abdominal: Soft. She exhibits no distension. There is tenderness (generalized to deep palpation).   Hyperactive bowel  sounds   Musculoskeletal: She exhibits no edema.   Neurological: She is alert and oriented to person, place, and time.   Skin: Skin is warm and dry. She is not diaphoretic.   Psychiatric: She has a normal mood and affect. Her behavior is normal.   Nursing note and vitals reviewed.       Lab Review:     Results from last 7 days   Lab Units 05/10/19  1046 05/06/19  1411   SODIUM mmol/L 142  --    POTASSIUM mmol/L 3.0*  --    CHLORIDE mmol/L 98  --    CO2 mmol/L 28.7  --    BUN mg/dL 13  --    CREATININE mg/dL 0.82 0.80   GLUCOSE mg/dL 108*  --    CALCIUM mg/dL 9.1  --    AST (SGOT) U/L 26  --    ALT (SGPT) U/L 16  --      Estimated Creatinine Clearance: 47.7 mL/min (by C-G formula based on SCr of 0.82 mg/dL).          Results from last 7 days   Lab Units 05/10/19  1046   TROPONIN T ng/mL <0.010             Results from last 7 days   Lab Units 05/10/19  1046   MAGNESIUM mg/dL 2.1         Results from last 7 days   Lab Units 05/10/19  1046   WBC 10*3/mm3 9.01   HEMOGLOBIN g/dL 11.9*   HEMATOCRIT % 36.9   PLATELETS 10*3/mm3 437   MCV fL 90.0   MCH pg 29.0   MCHC g/dL 32.2   RDW % 12.6   RDW-SD fl 41.2   MPV fL 10.0   NEUTROPHIL % % 73.2   LYMPHOCYTE % % 12.3*   MONOCYTES % % 11.4   EOSINOPHIL % % 2.2   BASOPHIL % % 0.3   IMM GRAN % % 0.6*   NEUTROS ABS 10*3/mm3 6.59   LYMPHS ABS 10*3/mm3 1.11   MONOS ABS 10*3/mm3 1.03*   EOS ABS 10*3/mm3 0.20   BASOS ABS 10*3/mm3 0.03   IMMATURE GRANS (ABS) 10*3/mm3 0.05   NRBC /100 WBC 0.0     Results from last 7 days   Lab Units 05/10/19  1046   INR  1.03               Results from last 7 days   Lab Units 05/10/19  1046   LIPASE U/L 13                       Imaging:  Imaging Results (last 24 hours)     Procedure Component Value Units Date/Time    CT Abdomen Pelvis Without Contrast [[id number]] Collected:  05/10/19 1327     Updated:  05/10/19 1358    Narrative:       CT SCAN OF THE ABDOMEN AND PELVIS WITHOUT CONTRAST     HISTORY: Probable liver metastases noted on recent CT scan. Large  stone  in right renal pelvis with obstruction. Abdominal pain and constipation.     The CT scan was performed as an emergency procedure through the abdomen  and pelvis without contrast and compared to the recent enhanced CT scans  of the abdomen and pelvis performed 4 days ago on 05/06/2019. The  following findings are present:  1. Again noted are multiple low-density lesions scattered throughout the  liver consistent with extensive metastatic disease and these measure up  to 2.6 cm in size. One of these could be biopsied under CT guidance for  diagnosis if necessary.  2. The lung bases are clear. The spleen, pancreas, both adrenal glands  are unremarkable. There are multiple small gallstones within the  gallbladder.  3. There is a large ovoid stone in the right renal pelvis measuring up  to 13 mm and causing mild-to-moderate right renal obstruction that is  unchanged from 4 days ago. There are several smaller nonobstructing  stones scattered in the right kidney. The left kidney is unremarkable  except for a small cortical cyst.  4. There is no aortic aneurysm or retroperitoneal lymphadenopathy. The  large and small bowel loops are normal in caliber and show no  inflammatory change. No abnormality is seen in the pelvis.  5. At bone windows, no suspicious bone lesions are seen.           Radiation dose reduction techniques were utilized, including automated  exposure control and exposure modulation based on body size.     This report was finalized on 5/10/2019 1:55 PM by Dr. Shimon Canas M.D.       XR Chest 2 View [[id number]] Collected:  05/10/19 1216     Updated:  05/10/19 1228    Narrative:       PA AND LATERAL CHEST     HISTORY: Abdominal distention and bloating for 5 to 6 weeks.     COMPARISON: None.     FINDINGS: The cardiomediastinal silhouette is within normal limits.  Lungs appear clear and there is no evidence for pulmonary edema or  pleural effusion or infiltrate. Surgical clips overlie the right  lower  neck/thyroid. There is a minor scoliotic curvature of the thoracic  spine.       Impression:       No evidence for active disease in the chest.     This report was finalized on 5/10/2019 12:25 PM by Dr. Raj Slater M.D.             EKG:  ECG 12 Lead   Preliminary Result   HEART RATE= 68  bpm   RR Interval= 884  ms   SC Interval= 130  ms   P Horizontal Axis=   deg   P Front Axis= 86  deg   QRSD Interval= 77  ms   QT Interval= 430  ms   QRS Axis= 31  deg   T Wave Axis= -54  deg   - OTHERWISE NORMAL ECG -   Sinus rhythm   Low voltage, extremity leads   Minimal ST depression, anterolateral leads   Electronically Signed By:    Date and Time of Study: 2019-05-10 11:26:02          I reviewed the patient's new clinical results and medications.  I reviewed the patient's new imaging results and agree with the interpretation.  I personally viewed and interpreted the patient's EKG/Telemetry data.    Problem List:     Active Hospital Problems    Diagnosis  POA   • **Abdominal pain [R10.9]  Yes   • Malnutrition (CMS/HCC) [E46]  Unknown   • Liver lesion [K76.9]  Unknown   • Kidney stone [N20.0]  Unknown   • Hypokalemia [E87.6]  Unknown      Resolved Hospital Problems   No resolved problems to display.        Assessment and Plan:       Abdominal pain  She has had chronic issues that have been worsening. She has difficulty eliminating and decreased appetite. There is concern for possible colon cancer as she has had polyps in the past.   - consult GI for colonoscopy  - Zofran for nausea  - hold laxatives  - clear liquid diet      Malnutrition (CMS/HCC)  - consult nutrition for evaluation and recommendations      Liver lesions  Discussed possible biopsy with patient and she is hesitant to consider given that she is unsure if she would do any treatment with chemo or radiation because she has no family here for support.   - consult oncology for input   - consider liver biopsy       Kidney stone  Not causing any symptoms at  this point and unchanged from prior 4 days ago. Labs stable.  - will monitor      Hypokalemia  - replace per protocol    Admit to tele  DVT prophylaxis: SCD's  Code status addressed: full code  Diet: clear liquid    I discussed the patients findings and my recommendations with patient and Dr. Dickens.    CALVIN Hamilton  [facility]  05/10/19  2:48 PM    Dictated utilizing Dragon dictation    Addendum:  I have personally interviewed and examined the patient and agree with the above note.  Patient with abdominal pain and constipation found to have multiple liver lesions and a right obstructive renal pelvis stone.  Physical exam is remarkable for vague abdominal tenderness particularly in the right lower quadrant but no peritoneal signs and no flank tenderness.  Heart is RRR and lungs are clear.  GI and oncology consulted, GI planning colonoscopy tomorrow.  Urology consult for obstructive renal stone and check UA.    Sonny Dickens MD  [facility]  05/10/19  7:10 PM

## 2019-05-10 NOTE — ED TRIAGE NOTES
Pt reports abd, distention and bloating and constipation x 5-6 weeks. Pt has been seeing Dr. Estes for this. Pt had CT abd. 5/6/19 which reports multiple low density masses on liver cholelithiasis, right obstructing calculus at ureteropelvic junction. Dr. Estes told patient to come to ER if feeling worse or increased weakness.

## 2019-05-10 NOTE — PLAN OF CARE
Problem: Patient Care Overview  Goal: Plan of Care Review  Outcome: Ongoing (interventions implemented as appropriate)   05/10/19 1700   Coping/Psychosocial   Plan of Care Reviewed With patient   OTHER   Outcome Summary vss, no c/o pain, states she has no family, lives alone, states she has cancer & she is not going to do any treatment, talked with pt re : diagnosis,(suspected cancer) oncology consult placed has not seen pt yet & to discuss her concerns withthem, that multiple treatments are available today & not necessarilky a death sentence, agrees to talk with oncologist, requests to talk with cooperative care & palliative but not pastoral, concerns ack   05/10/19 1700   Coping/Psychosocial   Plan of Care Reviewed With patient   OTHER   Outcome Summary vss, no c/o pain, states she has no family, lives alone, states she has cancer & she is not going to do any treatment, talked with pt re : diagnosis,(suspected cancer) oncology consult placed has not seen pt yet & to discuss her concerns withthem, that multiple treatments are available today & not necessarilky a death sentence, agrees to talk with oncologist, requests to talk with cooperative care & palliative but not pastoral, concerns acknowledged   nowledged     Goal: Individualization and Mutuality  Outcome: Ongoing (interventions implemented as appropriate)   05/10/19 1700   Mutuality/Individual Preferences   How Would You and/or Your Support Person Like to Participate in Your Care? pt states she has no family     Goal: Discharge Needs Assessment  Outcome: Ongoing (interventions implemented as appropriate)      Problem: Constipation (Adult)  Goal: Identify Related Risk Factors and Signs and Symptoms  Outcome: Ongoing (interventions implemented as appropriate)    Goal: Effective Bowel Elimination  Outcome: Ongoing (interventions implemented as appropriate)    Goal: Comfort  Outcome: Ongoing (interventions implemented as appropriate)      Problem: Pain, Acute  (Adult)  Goal: Identify Related Risk Factors and Signs and Symptoms  Outcome: Ongoing (interventions implemented as appropriate)    Goal: Acceptable Pain Control/Comfort Level  Outcome: Ongoing (interventions implemented as appropriate)

## 2019-05-11 ENCOUNTER — ANESTHESIA (OUTPATIENT)
Dept: GASTROENTEROLOGY | Facility: HOSPITAL | Age: 79
End: 2019-05-11

## 2019-05-11 ENCOUNTER — ANESTHESIA EVENT (OUTPATIENT)
Dept: GASTROENTEROLOGY | Facility: HOSPITAL | Age: 79
End: 2019-05-11

## 2019-05-11 LAB
CEA SERPL-MCNC: 40.8 NG/ML
POTASSIUM BLD-SCNC: 3.1 MMOL/L (ref 3.5–5.2)

## 2019-05-11 PROCEDURE — 45381 COLONOSCOPY SUBMUCOUS NJX: CPT | Performed by: INTERNAL MEDICINE

## 2019-05-11 PROCEDURE — 81275 KRAS GENE VARIANTS EXON 2: CPT

## 2019-05-11 PROCEDURE — 88342 IMHCHEM/IMCYTCHM 1ST ANTB: CPT

## 2019-05-11 PROCEDURE — 99223 1ST HOSP IP/OBS HIGH 75: CPT | Performed by: INTERNAL MEDICINE

## 2019-05-11 PROCEDURE — 25010000002 PROPOFOL 10 MG/ML EMULSION: Performed by: ANESTHESIOLOGY

## 2019-05-11 PROCEDURE — 88305 TISSUE EXAM BY PATHOLOGIST: CPT | Performed by: INTERNAL MEDICINE

## 2019-05-11 PROCEDURE — 81403 MOPATH PROCEDURE LEVEL 4: CPT

## 2019-05-11 PROCEDURE — 88341 IMHCHEM/IMCYTCHM EA ADD ANTB: CPT

## 2019-05-11 PROCEDURE — 84132 ASSAY OF SERUM POTASSIUM: CPT | Performed by: INTERNAL MEDICINE

## 2019-05-11 PROCEDURE — 45380 COLONOSCOPY AND BIOPSY: CPT | Performed by: INTERNAL MEDICINE

## 2019-05-11 PROCEDURE — 0DBL8ZX EXCISION OF TRANSVERSE COLON, VIA NATURAL OR ARTIFICIAL OPENING ENDOSCOPIC, DIAGNOSTIC: ICD-10-PCS | Performed by: INTERNAL MEDICINE

## 2019-05-11 PROCEDURE — 99221 1ST HOSP IP/OBS SF/LOW 40: CPT | Performed by: SURGERY

## 2019-05-11 PROCEDURE — 88381 MICRODISSECTION MANUAL: CPT

## 2019-05-11 RX ORDER — SODIUM CHLORIDE, SODIUM LACTATE, POTASSIUM CHLORIDE, CALCIUM CHLORIDE 600; 310; 30; 20 MG/100ML; MG/100ML; MG/100ML; MG/100ML
INJECTION, SOLUTION INTRAVENOUS CONTINUOUS PRN
Status: DISCONTINUED | OUTPATIENT
Start: 2019-05-11 | End: 2019-05-11 | Stop reason: SURG

## 2019-05-11 RX ORDER — SODIUM CHLORIDE 9 MG/ML
1000 INJECTION, SOLUTION INTRAVENOUS CONTINUOUS
Status: ACTIVE | OUTPATIENT
Start: 2019-05-11 | End: 2019-05-13

## 2019-05-11 RX ORDER — SODIUM CHLORIDE 0.9 % (FLUSH) 0.9 %
3 SYRINGE (ML) INJECTION AS NEEDED
Status: DISCONTINUED | OUTPATIENT
Start: 2019-05-11 | End: 2019-05-11 | Stop reason: HOSPADM

## 2019-05-11 RX ORDER — LIDOCAINE HYDROCHLORIDE 20 MG/ML
INJECTION, SOLUTION INFILTRATION; PERINEURAL AS NEEDED
Status: DISCONTINUED | OUTPATIENT
Start: 2019-05-11 | End: 2019-05-11 | Stop reason: SURG

## 2019-05-11 RX ORDER — PROPOFOL 10 MG/ML
VIAL (ML) INTRAVENOUS AS NEEDED
Status: DISCONTINUED | OUTPATIENT
Start: 2019-05-11 | End: 2019-05-11 | Stop reason: SURG

## 2019-05-11 RX ADMIN — SODIUM CHLORIDE, POTASSIUM CHLORIDE, SODIUM LACTATE AND CALCIUM CHLORIDE: 600; 310; 30; 20 INJECTION, SOLUTION INTRAVENOUS at 10:31

## 2019-05-11 RX ADMIN — POTASSIUM CHLORIDE 40 MEQ: 750 CAPSULE, EXTENDED RELEASE ORAL at 13:08

## 2019-05-11 RX ADMIN — PROPOFOL 80 MG: 10 INJECTION, EMULSION INTRAVENOUS at 10:31

## 2019-05-11 RX ADMIN — MULTIPLE VITAMINS W/ MINERALS TAB 1 TABLET: TAB at 12:23

## 2019-05-11 RX ADMIN — POLYETHYLENE GLYCOL-3350 AND ELECTROLYTES WITH FLAVOR PACK 2000 ML: 240; 5.84; 2.98; 6.72; 22.72 POWDER, FOR SOLUTION ORAL at 05:00

## 2019-05-11 RX ADMIN — PROPOFOL 80 MG: 10 INJECTION, EMULSION INTRAVENOUS at 10:35

## 2019-05-11 RX ADMIN — SODIUM CHLORIDE, PRESERVATIVE FREE 3 ML: 5 INJECTION INTRAVENOUS at 12:23

## 2019-05-11 RX ADMIN — Medication 1 CAPSULE: at 12:23

## 2019-05-11 RX ADMIN — LIDOCAINE HYDROCHLORIDE 40 MG: 20 INJECTION, SOLUTION INFILTRATION; PERINEURAL at 10:31

## 2019-05-11 RX ADMIN — SODIUM CHLORIDE 1000 ML: 9 INJECTION, SOLUTION INTRAVENOUS at 09:31

## 2019-05-11 RX ADMIN — POTASSIUM CHLORIDE 40 MEQ: 750 CAPSULE, EXTENDED RELEASE ORAL at 22:01

## 2019-05-11 RX ADMIN — PROPOFOL 80 MG: 10 INJECTION, EMULSION INTRAVENOUS at 10:40

## 2019-05-11 RX ADMIN — POTASSIUM CHLORIDE 40 MEQ: 750 CAPSULE, EXTENDED RELEASE ORAL at 16:51

## 2019-05-11 RX ADMIN — SODIUM CHLORIDE, PRESERVATIVE FREE 3 ML: 5 INJECTION INTRAVENOUS at 22:01

## 2019-05-11 NOTE — ANESTHESIA POSTPROCEDURE EVALUATION
Patient: Rosa Salazar    Procedure Summary     Date:  05/11/19 Room / Location:  [facility] ENDOSCOPY 7 / [facility] ENDOSCOPY    Anesthesia Start:  1031 Anesthesia Stop:  1055    Procedure:  COLONOSCOPY to transverse colon with biopsy, tattoo (N/A ) Diagnosis:       Colon cancer screening      (Colon cancer screening [Z12.11])    Surgeon:  Marcos Bethea MD Provider:  Bridger Centeno MD    Anesthesia Type:  MAC ASA Status:  2          Anesthesia Type: MAC  Last vitals  BP   (!) 89/57 (05/11/19 1055)   Temp   36.9 °C (98.5 °F) (05/11/19 0924)   Pulse   62 (05/11/19 1055)   Resp   14 (05/11/19 1055)     SpO2   99 % (05/11/19 1055)     Post Anesthesia Care and Evaluation    Patient location during evaluation: PACU  Patient participation: complete - patient participated  Level of consciousness: awake and alert  Pain management: adequate  Airway patency: patent  Anesthetic complications: No anesthetic complications    Cardiovascular status: acceptable  Respiratory status: acceptable  Hydration status: acceptable    Comments: --------------------            05/11/19 1055     --------------------   BP:     (!) 89/57    Pulse:      62       Resp:       14       Temp:                SpO2:      99%      --------------------

## 2019-05-11 NOTE — CONSULTS
[facility] Consult Note    Date of Hospital Admission:  5/10/2019  Today's Date:  5/11/2019                             Requesting Physician:  Sonny Dickens MD  Consulting Physician: CALVIN Dacosta    Reason for Consultation:  Renal stone     Patient Identification:   Rosa Salazar:   78 y.o.  female  1940  [id number]          Date:  5/11/2019     Chief Complaint   Patient presents with   • Abdominal Pain       History of Present Illness:     Ms. Salazar is a pleasant 78 y.o. year old female who presented to the ER with abdominal pain. Patient states she has had constipation for 6 weeks. Has a history of SBO with multiple abdominal surgeries.  Additionally, she has been found to have multiple liver lesions. CT has shown a large right renal stone with renal obstruction which is stable. She began to complain of increased right mid abdominal pain last evening. She states this began after starting her colonoscopy bowel prep.  She describes this pain as a constant, mild ache. Denies radiation to her right flank. She is currently scheduled to have a colonoscopy later today and a possible liver biopsy on Monday.  Her urologic history includes renal stone disease. She is from [place] and had stones treated there. She did spontaneously pass a stone at [facility] in 2014 but has not seen our practice as an outpatient.  She reports rare LORELEI and wears a light pad for this. She denies any gross hematuria or dysuria.        Past Medical History:     Past Medical History:   Diagnosis Date   • Basal cell carcinoma 2013, 2014   • Bowel obstruction (CMS/HCC)    • History of nephrolithiasis    • History of small bowel obstruction    • Hypercalcemia    • Hyperparathyroidism (CMS/HCC)    • Kidney stones    • Kidney stones    • Osteopenia    • Thyroid disease      Past Surgical History:     Past Surgical History:   Procedure Laterality Date   • APPENDECTOMY  1947   • BLADDER REPAIR  2009   • COLON SURGERY  2002    PERFORATED  COLON (2 SPOTS OF COLON)   • COLON SURGERY  2004    OBSTRUCTION   • COLONOSCOPY  01/2002    colon perforation  [place]   • HERNIA REPAIR  2009    x2   • INTESTINAL MALROTATION REPAIR  2001    TWISTED INTESTINE    • KIDNEY STONE SURGERY  2004   • OOPHORECTOMY     • SKIN CANCER EXCISION  03/24/2014    Excision basal cell carcinoma, right forehead (0.8cm) with frozen section control and layered wound closure (2.2cm)-Dr. Stuart Bajwa   • SKIN CANCER EXCISION  11/19/2013    Excision basal cell carcinoma, biopsy site upper mid forehead (0.6cm) with frozen section control and layered wound closures (1.2cm)-Dr. Stuart Huang   • THYROIDECTOMY Right 9/8/2016    Procedure: RIGHT SUPERIOR PARATHYROID ADENOMA RESECTION, RIGHT INFERIOR PARATHYROID BX;  Surgeon: Xin Melissa MD;  [place] [facility] [place] [facility]  Service:    • TOTAL ABDOMINAL HYSTERECTOMY  1978    X2 SURGERIES      Social History:     Social History     Socioeconomic History   • Marital status:      Spouse name: Not on file   • Number of children: Not on file   • Years of education: Not on file   • Highest education level: Not on file   Tobacco Use   • Smoking status: Current Some Day Smoker   • Smokeless tobacco: Never Used   • Tobacco comment: 1 x monthly   Substance and Sexual Activity   • Alcohol use: No     Frequency: Never   • Drug use: No     Family History:     Family History   Problem Relation Age of Onset   • Heart disease Mother    • Multiple myeloma Father    • Heart disease Father      Allergies:     Allergies   Allergen Reactions   • Advil [Ibuprofen] GI Intolerance   • Aspirin GI Intolerance   • Metronidazole GI Intolerance     Medications:     No current facility-administered medications on file prior to encounter.      Current Outpatient Medications on File Prior to Encounter   Medication Sig Dispense Refill   • acetaminophen (TYLENOL) 500 MG tablet Take 500 mg by mouth every 6 (six) hours as needed for mild pain (1-3).     • Bisacodyl (LAXATIVE PO) Take  "2 capsules by mouth daily. RAVI HERBAL LAXATIVE     • Bisacodyl (LAXATIVE PO) Take 5 mg by mouth 2 (two) times a day. EQUATE LAXATIVE BISACODYL     • calcitriol (ROCALTROL) 0.25 MCG capsule Begin one tablet daily of calcitriol, if you develop symptoms of numbness, tingling, or cramping despite use of calcium supplement tablets 30 capsule 0   • Multiple Vitamins-Minerals (MULTIVITAMIN WITH MINERALS) tablet tablet Take 1 tablet by mouth Daily.     • Probiotic Product (PROBIOTIC PO) Take 1 capsule by mouth daily. Multidophilus 12, 20 billion, take 1 capsule daily     • Psyllium (METAMUCIL) 28.3 % powder Take  by mouth daily. 1 TSP DAILY        • vitamin D (ERGOCALCIFEROL) 69023 UNITS capsule capsule Take 1 capsule by mouth 1 (One) Time Per Week. 12 capsule 3     Current Meds:     lactobacillus acidophilus 1 capsule Oral Daily   multivitamin with minerals 1 tablet Oral Daily   polyethylene glycol with electrolytes 2,000 mL Oral BID   sodium chloride 3 mL Intravenous Q12H        Review of Systems:     Constitutional:  No recent weight gain/loss, fever, chills  Opthalmalogic: No change in vision or blurred vision  Otolaryngeal:  No epistaxis  Cardiovascular: No recent chest pain  Pulmonary:  No cough, sputum production, hemoptysis  Gastrointestinal: Reports constipation   Genitourinary:  See HPI  Hematologic:  No tendency for easy bruising  Musculoskeletal: No muscle pain  Neurologic:  No Seizures     Physical Examination:     Vital signs: /61 (BP Location: Left arm, Patient Position: Lying)   Pulse 60   Temp 97.1 °F (36.2 °C) (Oral)   Resp 16   Ht 157.5 cm (62\")   Wt 49.7 kg (109 lb 9.6 oz)   SpO2 99%   BMI 20.05 kg/m²   99%     General: Well developed, well nourished, alert and oriented x 3    Appears stated age. No apparent distress.    HEENT: Moist mucous membranes of the oral mucosa & nasal mucosa.    Lips are not cyanotic.    PERRL. Extraocular movements intact    Neck:  Trachea position is mid " line.     Respiratory: Respiratory rhythm & depth: Normal.    Respiratory effort:  Normal.        Cardiac: RRR - peripheral pulse    GI:  Distended abdomen, soft    Skin:  Inspection: No rash.    Palpation:  Warm, dry. No induration.     Extremities: No clubbing & no cyanosis.    No edema    :  Deferred exam, voiding spontaneously      Lab Trends/Serial Data   No results found for: CHOL  No results found for: HDL  No components found for: LDLCALC  No components found for: TRIGLYCDES  Lab Results   Component Value Date    CREATININE 0.82 05/10/2019    CREATININE 0.80 05/06/2019    CREATININE 0.80 04/30/2019    CREATININE 0.79 10/10/2016     No results found for: BNP  No results found for: TROPONIN  Lab Results   Component Value Date    HGB 11.9 (L) 05/10/2019    HGB 12.7 04/30/2019    HGB 14.6 09/08/2016     Imaging Studies:     Procedure Component Value Units Date/Time   CT Abdomen Pelvis Without Contrast [[id number]] Gennaor as Reviewed   Order Status: Completed Collected: 05/10/19 1327    Updated: 05/10/19 1358   Narrative:     CT SCAN OF THE ABDOMEN AND PELVIS WITHOUT CONTRAST     HISTORY: Probable liver metastases noted on recent CT scan. Large stone  in right renal pelvis with obstruction. Abdominal pain and constipation.     The CT scan was performed as an emergency procedure through the abdomen  and pelvis without contrast and compared to the recent enhanced CT scans  of the abdomen and pelvis performed 4 days ago on 05/06/2019. The  following findings are present:  1. Again noted are multiple low-density lesions scattered throughout the  liver consistent with extensive metastatic disease and these measure up  to 2.6 cm in size. One of these could be biopsied under CT guidance for  diagnosis if necessary.  2. The lung bases are clear. The spleen, pancreas, both adrenal glands  are unremarkable. There are multiple small gallstones within the  gallbladder.  3. There is a large ovoid stone in the right renal  pelvis measuring up  to 13 mm and causing mild-to-moderate right renal obstruction that is  unchanged from 4 days ago. There are several smaller nonobstructing  stones scattered in the right kidney. The left kidney is unremarkable  except for a small cortical cyst.  4. There is no aortic aneurysm or retroperitoneal lymphadenopathy. The  large and small bowel loops are normal in caliber and show no  inflammatory change. No abnormality is seen in the pelvis.  5. At bone windows, no suspicious bone lesions are seen.           Radiation dose reduction techniques were utilized, including automated  exposure control and exposure modulation based on body size.     This report was finalized on 5/10/2019 1:55 PM by Dr. Shimon Canas,            Assessment:     Patient Active Problem List   Diagnosis   • Hypovitaminosis D   • Elevated alkaline phosphatase level   • History of small bowel obstruction   • Chronic idiopathic constipation   • Abdominal bloating   • Abdominal pain   • Malnutrition (CMS/HCC)   • Liver lesion   • Kidney stone   • Hypokalemia   • Colon cancer screening       1. Renal stone disease - right 13mm renal stone with mild obstruction  2. Constipation  3. Liver lesions  4. Abdominal pain     Recommendations:     1. Discussed possible right ureteral stent placement while inpatient and future stone treatment as outpatient  2. Will follow in am       I sincerely appreciate the opportunity to participate in your patient's care. Please feel free to contact me anytime if I can be of assistance in this or any other way.        CALVIN Dacosta   [facility]  5/11/2019  8:00 AM

## 2019-05-11 NOTE — CONSULTS
"Adult Nutrition  Assessment/PES    Patient Name:  Rosa Salazar  YOB: 1940  MRN: [id number]  Admit Date:  5/10/2019    Assessment Date:  5/11/2019    Comments:  Consult: [facility]. Mild malnutrition r/t severe wt loss over past month. Noted very poor intake through week prior to admission d/t abd. Pain and n/v. Hx of recurrent SBO. CT on admission found liver mets. Pt avoiding chemo, reports would prefer palliative care if dx at advanced stage. Nausea has improved, pt reports eating broth and attempted other clear liquids on tray but does not prefer very cold items. Pt declined supplement. Will continue to follow.     Reason for Assessment     Row Name 05/11/19 1427          Reason for Assessment    Reason For Assessment  physician consult [facility]     Diagnosis  gastrointestinal disease;cancer diagnosis/related complications Recurrent SBO, Liver mets     Identified At Risk by Screening Criteria  reduced oral intake over the last month;MST SCORE 2+         Nutrition/Diet History     Row Name 05/11/19 1428          Nutrition/Diet History    Typical Food/Fluid Intake  Decreased intake over past week d/t abd. pain, n/v, diarrhea. Hx of recurrent SBO. Found to have liver mets. on admission. #, .3#.      Factors Affecting Nutritional Intake  abdominal pain;diarrhea;nausea;vomiting         Anthropometrics     Row Name 05/11/19 1431          Anthropometrics    Height Method  measured     Height  157.5 cm (62\")     Current Weight Method  measured     Weight  49.7 kg (109 lb 9.1 oz)        Ideal Body Weight (IBW)    Ideal Body Weight (IBW) (kg)  50.43     % Ideal Body Weight  98.55        Usual Body Weight (UBW)    Usual Body Weight  52.2 kg (115 lb)     % Usual Body Weight  95.28     % of Usual Body Weight Assessment  85-95% - mild deficit     Weight Loss  unintentional     Weight Loss Time Frame  1 month        Body Mass Index (BMI)    BMI (kg/m2)  20.08     BMI Assessment  BMI 18.5-24.9: normal         " "    Labs/Tests/Procedures/Meds     Row Name 05/11/19 1435          Labs/Procedures/Meds    Lab Results Reviewed  reviewed, pertinent     Lab Results Comments  K, Glu        Diagnostic Tests/Procedures    Diagnostic Test/Procedures Comments  CT        Medications    Pertinent Medications Reviewed  reviewed, pertinent     Pertinent Medications Comments  MVI, Risaquad, 1000ml/hr IV's         Physical Findings     Row Name 05/11/19 1436          Physical Findings    Overall Physical Appearance  loss of muscle mass         Estimated/Assessed Needs     Row Name 05/11/19 1437 05/11/19 1431       Calculation Measurements    Weight Used For Calculations  49.7 kg (109 lb 9.1 oz)  --    Height  --  157.5 cm (62\")       Estimated/Assessed Needs    Additional Documentation  Fluid Requirements (Group);Protein Requirements (Group);Calorie Requirements (Group)  --       Calorie Requirements    Weight Used For Calorie Calculations  49.7 kg (109 lb 9.1 oz)  --    Estimated Calorie Requirement (kcal/day)  1223  --    Estimated Calorie Need Method  [place]-St Jeor  --    Estimated Calorie Requirement Comment  AF 1.3  --       KCAL/KG                            25 Kcal/Kg (kcal)  1242.5  --    30 Kcal/Kg (kcal)  1491  --                               [place]-St. Jeor Equation    RMR (P[place]enix-St. Jeor Equation)  935.25  --       Protein Requirements    Weight Used For Protein Calculations  49.7 kg (109 lb 9.1 oz)  --    Est Protein Requirement Amount (gms/kg)  1.2 gm protein  --    Estimated Protein Requirements (gms/day)  59.64  --       Fluid Requirements    Estimated Fluid Requirements (mL/day)  1500  --    Estimated Fluid Requirement Method  RDA Method  --    RDA Method (mL)  1500  --         Nutrition Prescription Ordered     Row Name 05/11/19 1438          Nutrition Prescription PO    Current PO Diet  Clear Liquid     Fluid Consistency  Thin         Evaluation of Received Nutrient/Fluid Intake     Row Name 05/11/19 1437 05/11/19 " "1431       Calculation Measurements    Weight Used For Calculations  49.7 kg (109 lb 9.1 oz)  --    Height  --  157.5 cm (62\")        Evaluation of Prescribed Nutrient/Fluid Intake     Row Name 05/11/19 1437 05/11/19 1431       Calculation Measurements    Weight Used For Calculations  49.7 kg (109 lb 9.1 oz)  --    Height  --  157.5 cm (62\")        Malnutrition Severity Assessment     Row Name 05/11/19 1438          Malnutrition Severity Assessment    Malnutrition Type  Acute Illness/Injury Malnutrition        Physical Signs of Malnutrition (Acute)    Muscle Wasting  Mild     Fat Loss  Mild     Fluid Accumulation  None     Secondary Physical Signs  None        Weight Status (Acute)    BMI  -- BMI 20     %IBW  -- 99% IBW     %UBW  -- 95% UBW     Weight Loss  Mod (5% / 1 mo)        Energy Intake Status (Acute)    Energy Intake  Severe (< or equal to 50% / > or equal to 5d)        Criteria Met (Must meet criteria for severity in at least 2 of these categories: M Wasting, Fat Loss, Fluid, Secondary Signs, Wt. Status, Intake)    Patient meets criteria for   Mild malnutrition         Problem/Interventions:  Problem 1     Row Name 05/11/19 1443          Nutrition Diagnoses Problem 1    Problem 1  Unintended Weight Loss     Etiology (related to)  Medical Diagnosis     Gastrointestinal  Bowel obstruction;Vomiting;Diarrhea;Nausea     Signs/Symptoms (evidenced by)  Report of Mnimal PO Intake;Unintended Weight Change     Unintended Weight Change  Loss     Number of Pounds Lost  6     Weight loss time period  <1 month         Intervention Goal     Row Name 05/11/19 1444          Intervention Goal    General  Maintain nutrition;Nutrition support treatment;Improved nutrition related lab(s);Reduce/improve symptoms;Meet nutritional needs for age/condition;Disease management/therapy     PO  Increase intake;Meet estimated needs;PO intake (%);Tolerate PO     PO Intake %  100 %     Weight  Maintain weight         Nutrition Intervention  "    Row Name 05/11/19 1445          Nutrition Intervention    RD/Tech Action  Interview for preference;Encourage intake;Follow Tx progress;Care plan reviewd         Education/Evaluation     Row Name 05/11/19 1445          Education    Education  Will Instruct as appropriate        Monitor/Evaluation    Monitor  Per protocol;PO intake;Pertinent labs;Weight;Skin status;Symptoms     Education Follow-up  Reinforce PRN           Electronically signed by:  Adriana Saunders MS,RD,LD  05/11/19 2:45 PM

## 2019-05-11 NOTE — PLAN OF CARE
Problem: Patient Care Overview  Goal: Plan of Care Review  Outcome: Ongoing (interventions implemented as appropriate)   05/11/19 9095   Coping/Psychosocial   Plan of Care Reviewed With patient   OTHER   Outcome Summary pt had colonoscopy today, colon mass found, plan for sx w Dr Jenn dai, NPO MN, gas discomfort from bowel prep last night, denies pain, pt states she doesnt have any family   Plan of Care Review   Progress no change     Goal: Individualization and Mutuality  Outcome: Ongoing (interventions implemented as appropriate)    Goal: Discharge Needs Assessment  Outcome: Ongoing (interventions implemented as appropriate)      Problem: Constipation (Adult)  Goal: Identify Related Risk Factors and Signs and Symptoms  Outcome: Outcome(s) achieved Date Met: 05/11/19    Goal: Effective Bowel Elimination  Outcome: Ongoing (interventions implemented as appropriate)    Goal: Comfort  Outcome: Ongoing (interventions implemented as appropriate)      Problem: Pain, Acute (Adult)  Goal: Identify Related Risk Factors and Signs and Symptoms  Outcome: Outcome(s) achieved Date Met: 05/11/19    Goal: Acceptable Pain Control/Comfort Level  Outcome: Ongoing (interventions implemented as appropriate)      Problem: Nutrition, Imbalanced: Inadequate Oral Intake (Adult)  Goal: Identify Related Risk Factors and Signs and Symptoms  Outcome: Outcome(s) achieved Date Met: 05/11/19    Goal: Improved Oral Intake  Outcome: Ongoing (interventions implemented as appropriate)    Goal: Prevent Further Weight Loss  Outcome: Ongoing (interventions implemented as appropriate)

## 2019-05-11 NOTE — ANESTHESIA PREPROCEDURE EVALUATION
Anesthesia Evaluation     Patient summary reviewed and Nursing notes reviewed                Airway   Mallampati: II  Dental      Pulmonary    (+) a smoker Current,   Cardiovascular - negative cardio ROS    ECG reviewed  Rhythm: regular  Rate: normal        Neuro/Psych- negative ROS  GI/Hepatic/Renal/Endo    (+)   renal disease stones,     Musculoskeletal (-) negative ROS    Abdominal    Substance History - negative use     OB/GYN negative ob/gyn ROS         Other      history of cancer remission                    Anesthesia Plan    ASA 2     MAC     intravenous induction   Anesthetic plan, all risks, benefits, and alternatives have been provided, discussed and informed consent has been obtained with: patient.

## 2019-05-11 NOTE — CONSULTS
Subjective     REASON FOR CONSULTATION:  Liver lesions  Provide an opinion on any further workup or treatment                             REQUESTING PHYSICIAN:  Maninder    RECORDS OBTAINED:  Records of the patients history including those obtained from the referring provider were reviewed and summarized in detail.    History of Present Illness   This is a very pleasant 78-year-old woman with no prior history of malignancy other than basal cell skin cancers.  She has a long history of bowel issues most of her life with constipation and abdominal pain intermittently.  However, her symptoms have significantly worsened over the past several months with increasing difficulty passing stool, loss of appetite although she denies significant weight loss.  She has had progressive weakness.  The patient states that her last colonoscopy was in 2002 which was complicated by a bowel perforation.  She was recently seen outpatient by gastroenterology and a CT of the abdomen pelvis on 5/6/2019 showed multiple low-density masses of varying sizes scattered throughout both lobes of the liver consistent with metastatic disease new since the preceding CT scan 9/27/2013.  There was a obstructing calculus at the right ureteral pelvic junction producing moderate right hydronephrosis.  There was no obvious mass in the pancreas or bowel.  She is scheduled for a colonoscopy this morning.  She denies bright red blood per rectum or melena.    Past Medical History:   Diagnosis Date   • Basal cell carcinoma 2013, 2014   • Bowel obstruction (CMS/HCC)    • History of nephrolithiasis    • History of small bowel obstruction    • Hypercalcemia    • Hyperparathyroidism (CMS/HCC)    • Kidney stones    • Kidney stones    • Osteopenia    • Thyroid disease         Past Surgical History:   Procedure Laterality Date   • APPENDECTOMY  1947   • BLADDER REPAIR  2009   • COLON SURGERY  2002    PERFORATED COLON (2 SPOTS OF COLON)   • COLON SURGERY  2004     OBSTRUCTION   • COLONOSCOPY  01/2002    colon perforation  [place]   • HERNIA REPAIR  2009    x2   • INTESTINAL MALROTATION REPAIR  2001    TWISTED INTESTINE    • KIDNEY STONE SURGERY  2004   • OOPHORECTOMY     • SKIN CANCER EXCISION  03/24/2014    Excision basal cell carcinoma, right forehead (0.8cm) with frozen section control and layered wound closure (2.2cm)-Dr. Stuart Bajwa   • SKIN CANCER EXCISION  11/19/2013    Excision basal cell carcinoma, biopsy site upper mid forehead (0.6cm) with frozen section control and layered wound closures (1.2cm)-Dr. Stuart Huang   • THYROIDECTOMY Right 9/8/2016    Procedure: RIGHT SUPERIOR PARATHYROID ADENOMA RESECTION, RIGHT INFERIOR PARATHYROID BX;  Surgeon: Xin Melissa MD;  Location[place] [facility] [place] [facility] [place]  Service:    • TOTAL ABDOMINAL HYSTERECTOMY  1978    X2 SURGERIES         No current facility-administered medications on file prior to encounter.      Current Outpatient Medications on File Prior to Encounter   Medication Sig Dispense Refill   • acetaminophen (TYLENOL) 500 MG tablet Take 500 mg by mouth every 6 (six) hours as needed for mild pain (1-3).     • Bisacodyl (LAXATIVE PO) Take 2 capsules by mouth daily. SWISSKISS HERBAL LAXATIVE     • Bisacodyl (LAXATIVE PO) Take 5 mg by mouth 2 (two) times a day. EQUATE LAXATIVE BISACODYL     • calcitriol (ROCALTROL) 0.25 MCG capsule Begin one tablet daily of calcitriol, if you develop symptoms of numbness, tingling, or cramping despite use of calcium supplement tablets 30 capsule 0   • Multiple Vitamins-Minerals (MULTIVITAMIN WITH MINERALS) tablet tablet Take 1 tablet by mouth Daily.     • Probiotic Product (PROBIOTIC PO) Take 1 capsule by mouth daily. Multidophilus 12, 20 billion, take 1 capsule daily     • Psyllium (METAMUCIL) 28.3 % powder Take  by mouth daily. 1 TSP DAILY        • vitamin D (ERGOCALCIFEROL) 62447 UNITS capsule capsule Take 1 capsule by mouth 1 (One) Time Per Week. 12 capsule 3        ALLERGIES:    Allergies   Allergen  "Reactions   • Advil [Ibuprofen] GI Intolerance   • Aspirin GI Intolerance   • Metronidazole GI Intolerance        Social History     Socioeconomic History   • Marital status:      Spouse name: Not on file   • Number of children: Not on file   • Years of education: Not on file   • Highest education level: Not on file   Tobacco Use   • Smoking status: Current Some Day Smoker   • Smokeless tobacco: Never Used   • Tobacco comment: 1 x monthly   Substance and Sexual Activity   • Alcohol use: No     Frequency: Never   • Drug use: No        Family History   Problem Relation Age of Onset   • Heart disease Mother    • Multiple myeloma Father    • Heart disease Father         Review of Systems   Constitutional: Positive for activity change and appetite change. Negative for fever.   HENT: Negative.    Respiratory: Negative.    Cardiovascular: Negative.    Gastrointestinal: Positive for abdominal pain and constipation. Negative for blood in stool, diarrhea, nausea and vomiting.   Genitourinary: Negative.    Musculoskeletal: Negative.    Skin: Negative.    Neurological: Positive for weakness. Negative for seizures and headaches.   Hematological: Negative.    Psychiatric/Behavioral: Negative for confusion. The patient is nervous/anxious.           Objective     Vitals:    05/10/19 1450 05/10/19 1826 05/10/19 2216 05/11/19 0455   BP: 129/69 104/56 113/62 106/61   BP Location: Right arm Right arm Right arm Left arm   Patient Position: Sitting Lying Lying Lying   Pulse: 91 67 62 60   Resp: 16 16 16 16   Temp: 98.7 °F (37.1 °C) 98.1 °F (36.7 °C) 98.3 °F (36.8 °C) 97.1 °F (36.2 °C)   TempSrc: Oral Oral Oral Oral   SpO2: 97% 97% 97% 99%   Weight: 49.7 kg (109 lb 9.6 oz)      Height: 157.5 cm (62\")        No flowsheet data found.    Physical Exam    CON: pleasant well-developed adult woman  HEENT: no icterus, no thrush, moist membranes  NECK: no jvd  LYMPH: no cervical or supraclavicular lad  CV: RRR, S1S2, no murmur  RESP: cta " bilat, no wheezing, no rales  GI: soft, mild tender ruq, no splenomegaly, +bs, liver palp below RCM  MUSC: no edema   NEURO: alert and oriented x3, normal strength  PSYCH: tearful/anxious    RECENT LABS:  Hematology WBC   Date Value Ref Range Status   05/10/2019 9.01 3.40 - 10.80 10*3/mm3 Final     RBC   Date Value Ref Range Status   05/10/2019 4.10 3.77 - 5.28 10*6/mm3 Final     Hemoglobin   Date Value Ref Range Status   05/10/2019 11.9 (L) 12.0 - 15.9 g/dL Final     Hematocrit   Date Value Ref Range Status   05/10/2019 36.9 34.0 - 46.6 % Final     Platelets   Date Value Ref Range Status   05/10/2019 437 140 - 450 10*3/mm3 Final        Lab Results   Component Value Date    GLUCOSE 108 (H) 05/10/2019    BUN 13 05/10/2019    CREATININE 0.82 05/10/2019    EGFRIFNONA 68 05/10/2019    EGFRIFAFRI 84 04/30/2019    BCR 15.9 05/10/2019    K 3.0 (L) 05/10/2019    CO2 28.7 05/10/2019    CALCIUM 9.1 05/10/2019    PROTENTOTREF 6.8 04/30/2019    ALBUMIN 3.80 05/10/2019    LABIL2 1.5 04/30/2019    AST 26 05/10/2019    ALT 16 05/10/2019     I personally reviewed CT abdomen/pelvis from 5/6/2019: There were multiple large masses in both lobes of the liver scattered    Assessment/Plan     1.  Probable metastatic disease to liver: The patient presents with loss of appetite, constipation and weakness with CT of the abdomen and pelvis on 5/6 and 5/10/2019 both showing multiple low-density masses scattered throughout the liver consistent with metastatic disease.  She has no prior malignant history other than basal cell skin cancers.  Suspicion is for colon cancer and she is scheduled for a colonoscopy this morning.  If the colonoscopy is negative, CT-guided biopsy of a liver lesion would be the next step in obtaining a diagnosis.    The patient states that if she is diagnosed with a stage IV malignancy she would under no circumstances consider systemic chemotherapy.  She would want to focus on palliation and go home with hospice  support.    2.  Mild anemia: Hemoglobin 11.9 with MCV 90.  White blood cell and platelet counts are normal.  I will check a ferritin, iron profile, B12 and folate    3.  Obstructive right renal pelvis ureteral stone: Urology consulted to evaluate    Thank you for allowing me to participate in the care of this very nice lady.  We will follow along and assist with her care and once a diagnosis is established discuss further her goals of care and treatment options.

## 2019-05-11 NOTE — CONSULTS
"Cc: Near obstructing colon cancer    History of presenting illness:   This is a very nice, unfortunate 78-year-old lady with a recently noted metastatic cancer, found to be from a colon primary had colonoscopy earlier today.  Patient reports several weeks of difficulty with her bowels working, although this is more of an exacerbation of a more chronic problem.  She denies any rectal bleeding.  She denies weight loss although she says her appetite is been quite poor.  She does have some nausea.  No significant abdominal pain.  No known family history of colon cancer.    Past Medical History: Bowel obstruction, hyperparathyroidism, renal stone disease, no history of heart, lung or hepatic disease known    Past Surgical History: Complicated surgical history.  Patient had an appendectomy in 1947.  In 2001 she says she had a laparotomy for \"twisted bowel, \" she believes this included a small bowel resection at the time.  Approximately 1 year later she was undergoing colonoscopy and had a perforation and subsequent exploration.  The patient states that she thinks that there were 2 holes made and she does not believe there was a colon resection performed, but rather repair of these perforations.  She was treated for a small bowel obstruction conservatively in 2004.  In 2009 she underwent another exploration and says at that time there was an extensive adhesio lysis performed as well as repair of what the patient says were 2 separate hernias.  The precise nature of this operation is unclear.  She states that she has not had any abdominal surgery in the last 10 years.    Medications: Significant for Metamucil, laxative use, vitamin D, multivitamin, calcitriol    Allergies: Advil, aspirin, Flagyl    Social History: Patient admits to smoking occasionally, but has not recently.  Denies alcohol use.  She lives independently.  She has no living family around at this time.    Family History: Needed for known colorectal " cancer    Review of Systems:  Constitutional: Positive for decreased appetite, negative for known weight loss, negative for fever  Neck: no swollen glands or dysphagia or odynophagia  Respiratory: negative for SOB, cough, hemoptysis or wheezing  Cardiovascular: negative for chest pain, palpitations or peripheral edema  Gastrointestinal: Positive for constipation, negative for rectal bleeding or abdominal pain, positive for bloating      Physical Exam:   Body mass index 20.0  General: alert and oriented, appropriate, no acute distress  Neck: Supple without lymphadenopathy or thyromegaly, trachea is in the midline  Respiratory: Lungs are clear bilaterally without wheezing, no use of accessory muscles is noted  Cardiovascular: Regular rate and rhythm without murmur, no peripheral edema  Gastrointestinal: Soft, mildly distended, some tympany noted, but no tenderness.  No hernia appreciated.  Well-healed surgical incision that extends from several centimeters above the umbilicus essentially down to the pubic symphysis.  No guarding or rebound.  No mass appreciated.  Liver is nonpalpable.    Laboratory data: White blood cell count 9.0.  Hemoglobin 11.9.  Platelets normal.  Chemistry largely unremarkable.  Potassium slightly low at 3.0.  Alkaline phosphatase mildly elevated at 135.  Albumin normal at 3.8.    Imaging data: CT of the abdomen and pelvis is reviewed by me.  Multiple low-density lesions within the liver are noted, consistent with metastatic disease.  I am not able to really appreciate the tumor within the colon.  The small bowel loops appeared fairly normal.  No clear evidence of bowel obstruction.    Images from her colonoscopy are also reviewed.  A near obstructing mass is noted.  It is suggested to be at around 55 or 60 cm.      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  She does not wish for excessive more radical care, but given the  near obstructing nature of this she does wish to have that relieved.  I discussed with the patient potential problems with that.  She understands that her past surgical history does complicate this.  At any rate though, I think that resection of this colon mass is likely to be beneficial to her, but at the very least diverting colostomy would be reasonable.  If we are not able to find or easily resect this a loop diverting colostomy might be an option.  I think that diverting colostomy is a high likelihood for her whether we resect the mass or not.  She does not wish to have further aggressive systemic chemotherapy postoperatively.  She understands that risks of the operation include, but are not limited to, bleeding, infection, injury to intra-abdominal structures, possible need for bowel resection, possibility of colostomy, wound infection, hernia and the possibility of the need for further revisional surgery and she is agreeable to proceed.  I have her currently on the schedule for tomorrow morning.      Sandro Barajas MD, FACS  General, Minimally Invasive and Endoscopic Surgery  [facility], operated by [facility]    [address], [address]  [place], [place], [address]  P: [phone]  F: [phone]

## 2019-05-11 NOTE — PLAN OF CARE
Problem: Patient Care Overview  Goal: Plan of Care Review  Outcome: Ongoing (interventions implemented as appropriate)   05/11/19 0557   Coping/Psychosocial   Plan of Care Reviewed With patient   OTHER   Outcome Summary Pt is being prepped for a colonoscopy today with Dr Bethea but states she wants to wait to sign consent after she talks to Dr Bethea. She started on golyetly last night but did not drink all that she should have. Afebrile and VS stable.Pt awakened early this am and started drinking and has almost finished the [facility]      Plan of Care Review   Progress no change     Goal: Individualization and Mutuality  Outcome: Ongoing (interventions implemented as appropriate)    Goal: Discharge Needs Assessment  Outcome: Ongoing (interventions implemented as appropriate)    Goal: Interprofessional Rounds/Family Conf  Outcome: Ongoing (interventions implemented as appropriate)      Problem: Constipation (Adult)  Goal: Identify Related Risk Factors and Signs and Symptoms  Outcome: Ongoing (interventions implemented as appropriate)    Goal: Effective Bowel Elimination  Outcome: Ongoing (interventions implemented as appropriate)    Goal: Comfort  Outcome: Ongoing (interventions implemented as appropriate)      Problem: Pain, Acute (Adult)  Goal: Identify Related Risk Factors and Signs and Symptoms  Outcome: Ongoing (interventions implemented as appropriate)    Goal: Acceptable Pain Control/Comfort Level  Outcome: Ongoing (interventions implemented as appropriate)

## 2019-05-12 ENCOUNTER — ANESTHESIA EVENT (OUTPATIENT)
Dept: PERIOP | Facility: HOSPITAL | Age: 79
End: 2019-05-12

## 2019-05-12 ENCOUNTER — ANESTHESIA (OUTPATIENT)
Dept: PERIOP | Facility: HOSPITAL | Age: 79
End: 2019-05-12

## 2019-05-12 LAB
ANION GAP SERPL CALCULATED.3IONS-SCNC: 9 MMOL/L
BUN BLD-MCNC: 5 MG/DL (ref 8–23)
BUN/CREAT SERPL: 7.7 (ref 7–25)
CALCIUM SPEC-SCNC: 8.4 MG/DL (ref 8.6–10.5)
CHLORIDE SERPL-SCNC: 109 MMOL/L (ref 98–107)
CO2 SERPL-SCNC: 27 MMOL/L (ref 22–29)
CREAT BLD-MCNC: 0.65 MG/DL (ref 0.57–1)
FERRITIN SERPL-MCNC: 72.9 NG/ML (ref 13–150)
FOLATE SERPL-MCNC: 19.7 NG/ML (ref 4.78–24.2)
GFR SERPL CREATININE-BSD FRML MDRD: 88 ML/MIN/1.73
GLUCOSE BLD-MCNC: 104 MG/DL (ref 65–99)
IRON 24H UR-MRATE: 21 MCG/DL (ref 37–145)
IRON SATN MFR SERPL: 8 % (ref 20–50)
POTASSIUM BLD-SCNC: 3.4 MMOL/L (ref 3.5–5.2)
POTASSIUM BLD-SCNC: 4.8 MMOL/L (ref 3.5–5.2)
SODIUM BLD-SCNC: 145 MMOL/L (ref 136–145)
TIBC SERPL-MCNC: 252 MCG/DL (ref 298–536)
TRANSFERRIN SERPL-MCNC: 169 MG/DL (ref 200–360)
VIT B12 BLD-MCNC: >2000 PG/ML (ref 211–946)

## 2019-05-12 PROCEDURE — 25010000002 MAGNESIUM SULFATE PER 500 MG OF MAGNESIUM: Performed by: NURSE ANESTHETIST, CERTIFIED REGISTERED

## 2019-05-12 PROCEDURE — 44140 PARTIAL REMOVAL OF COLON: CPT | Performed by: SURGERY

## 2019-05-12 PROCEDURE — 25010000002 FENTANYL CITRATE (PF) 100 MCG/2ML SOLUTION: Performed by: NURSE ANESTHETIST, CERTIFIED REGISTERED

## 2019-05-12 PROCEDURE — C1765 ADHESION BARRIER: HCPCS | Performed by: SURGERY

## 2019-05-12 PROCEDURE — 25010000003 BUPIVACAINE LIPOSOME 1.3 % SUSPENSION 20 ML VIAL: Performed by: SURGERY

## 2019-05-12 PROCEDURE — 84132 ASSAY OF SERUM POTASSIUM: CPT | Performed by: INTERNAL MEDICINE

## 2019-05-12 PROCEDURE — 25010000002 HYDROMORPHONE PER 4 MG: Performed by: SURGERY

## 2019-05-12 PROCEDURE — 25010000002 NEOSTIGMINE PER 0.5 MG: Performed by: NURSE ANESTHETIST, CERTIFIED REGISTERED

## 2019-05-12 PROCEDURE — 25010000002 ONDANSETRON PER 1 MG: Performed by: NURSE ANESTHETIST, CERTIFIED REGISTERED

## 2019-05-12 PROCEDURE — 99231 SBSQ HOSP IP/OBS SF/LOW 25: CPT | Performed by: INTERNAL MEDICINE

## 2019-05-12 PROCEDURE — 83540 ASSAY OF IRON: CPT | Performed by: INTERNAL MEDICINE

## 2019-05-12 PROCEDURE — 25010000002 ERTAPENEM 1 GM/100ML SOLUTION: Performed by: SURGERY

## 2019-05-12 PROCEDURE — 82728 ASSAY OF FERRITIN: CPT | Performed by: INTERNAL MEDICINE

## 2019-05-12 PROCEDURE — 82607 VITAMIN B-12: CPT | Performed by: INTERNAL MEDICINE

## 2019-05-12 PROCEDURE — 25010000002 HYDROMORPHONE PER 4 MG: Performed by: NURSE ANESTHETIST, CERTIFIED REGISTERED

## 2019-05-12 PROCEDURE — C9290 INJ, BUPIVACAINE LIPOSOME: HCPCS | Performed by: SURGERY

## 2019-05-12 PROCEDURE — 25010000002 DEXAMETHASONE PER 1 MG: Performed by: NURSE ANESTHETIST, CERTIFIED REGISTERED

## 2019-05-12 PROCEDURE — 82746 ASSAY OF FOLIC ACID SERUM: CPT | Performed by: INTERNAL MEDICINE

## 2019-05-12 PROCEDURE — 80048 BASIC METABOLIC PNL TOTAL CA: CPT | Performed by: INTERNAL MEDICINE

## 2019-05-12 PROCEDURE — 25010000003 POTASSIUM CHLORIDE 10 MEQ/100ML SOLUTION: Performed by: NURSE PRACTITIONER

## 2019-05-12 PROCEDURE — 0DBL0ZZ EXCISION OF TRANSVERSE COLON, OPEN APPROACH: ICD-10-PCS | Performed by: SURGERY

## 2019-05-12 PROCEDURE — 25010000002 PROPOFOL 10 MG/ML EMULSION: Performed by: NURSE ANESTHETIST, CERTIFIED REGISTERED

## 2019-05-12 PROCEDURE — 84466 ASSAY OF TRANSFERRIN: CPT | Performed by: INTERNAL MEDICINE

## 2019-05-12 PROCEDURE — 88309 TISSUE EXAM BY PATHOLOGIST: CPT | Performed by: SURGERY

## 2019-05-12 PROCEDURE — 25010000002 LIDOCAINE PER 10 MG: Performed by: NURSE ANESTHETIST, CERTIFIED REGISTERED

## 2019-05-12 DEVICE — PROXIMATE LINEAR CUTTER RELOAD, BLUE, 75MM
Type: IMPLANTABLE DEVICE | Site: ABDOMEN | Status: FUNCTIONAL
Brand: PROXIMATE

## 2019-05-12 RX ORDER — PROMETHAZINE HYDROCHLORIDE 25 MG/1
25 SUPPOSITORY RECTAL ONCE AS NEEDED
Status: DISCONTINUED | OUTPATIENT
Start: 2019-05-12 | End: 2019-05-12 | Stop reason: HOSPADM

## 2019-05-12 RX ORDER — FENTANYL CITRATE 50 UG/ML
50 INJECTION, SOLUTION INTRAMUSCULAR; INTRAVENOUS
Status: DISCONTINUED | OUTPATIENT
Start: 2019-05-12 | End: 2019-05-12 | Stop reason: HOSPADM

## 2019-05-12 RX ORDER — FAMOTIDINE 10 MG/ML
20 INJECTION, SOLUTION INTRAVENOUS ONCE
Status: COMPLETED | OUTPATIENT
Start: 2019-05-12 | End: 2019-05-12

## 2019-05-12 RX ORDER — CELECOXIB 200 MG/1
200 CAPSULE ORAL EVERY 12 HOURS SCHEDULED
Status: DISCONTINUED | OUTPATIENT
Start: 2019-05-12 | End: 2019-05-14

## 2019-05-12 RX ORDER — FENTANYL CITRATE 50 UG/ML
INJECTION, SOLUTION INTRAMUSCULAR; INTRAVENOUS AS NEEDED
Status: DISCONTINUED | OUTPATIENT
Start: 2019-05-12 | End: 2019-05-12 | Stop reason: SURG

## 2019-05-12 RX ORDER — PROMETHAZINE HYDROCHLORIDE 25 MG/ML
6.25 INJECTION, SOLUTION INTRAMUSCULAR; INTRAVENOUS
Status: DISCONTINUED | OUTPATIENT
Start: 2019-05-12 | End: 2019-05-12 | Stop reason: HOSPADM

## 2019-05-12 RX ORDER — DIPHENHYDRAMINE HCL 25 MG
25 CAPSULE ORAL EVERY 6 HOURS PRN
Status: DISCONTINUED | OUTPATIENT
Start: 2019-05-12 | End: 2019-05-12 | Stop reason: HOSPADM

## 2019-05-12 RX ORDER — HYDROMORPHONE HYDROCHLORIDE 1 MG/ML
0.5 INJECTION, SOLUTION INTRAMUSCULAR; INTRAVENOUS; SUBCUTANEOUS
Status: DISCONTINUED | OUTPATIENT
Start: 2019-05-12 | End: 2019-05-20 | Stop reason: HOSPADM

## 2019-05-12 RX ORDER — DIPHENHYDRAMINE HCL 25 MG
25 CAPSULE ORAL
Status: DISCONTINUED | OUTPATIENT
Start: 2019-05-12 | End: 2019-05-12 | Stop reason: HOSPADM

## 2019-05-12 RX ORDER — MAGNESIUM SULFATE HEPTAHYDRATE 500 MG/ML
INJECTION, SOLUTION INTRAMUSCULAR; INTRAVENOUS AS NEEDED
Status: DISCONTINUED | OUTPATIENT
Start: 2019-05-12 | End: 2019-05-12 | Stop reason: SURG

## 2019-05-12 RX ORDER — HYDROCODONE BITARTRATE AND ACETAMINOPHEN 7.5; 325 MG/1; MG/1
1 TABLET ORAL ONCE AS NEEDED
Status: DISCONTINUED | OUTPATIENT
Start: 2019-05-12 | End: 2019-05-12 | Stop reason: HOSPADM

## 2019-05-12 RX ORDER — FENTANYL CITRATE 50 UG/ML
INJECTION, SOLUTION INTRAMUSCULAR; INTRAVENOUS
Status: COMPLETED
Start: 2019-05-12 | End: 2019-05-12

## 2019-05-12 RX ORDER — EPHEDRINE SULFATE 50 MG/ML
INJECTION, SOLUTION INTRAVENOUS AS NEEDED
Status: DISCONTINUED | OUTPATIENT
Start: 2019-05-12 | End: 2019-05-12 | Stop reason: SURG

## 2019-05-12 RX ORDER — KETAMINE HYDROCHLORIDE 10 MG/ML
INJECTION INTRAMUSCULAR; INTRAVENOUS AS NEEDED
Status: DISCONTINUED | OUTPATIENT
Start: 2019-05-12 | End: 2019-05-12 | Stop reason: SURG

## 2019-05-12 RX ORDER — ACETAMINOPHEN 325 MG/1
650 TABLET ORAL ONCE AS NEEDED
Status: DISCONTINUED | OUTPATIENT
Start: 2019-05-12 | End: 2019-05-12 | Stop reason: HOSPADM

## 2019-05-12 RX ORDER — ALVIMOPAN 12 MG/1
12 CAPSULE ORAL 2 TIMES DAILY
Status: DISCONTINUED | OUTPATIENT
Start: 2019-05-12 | End: 2019-05-17

## 2019-05-12 RX ORDER — EPHEDRINE SULFATE 50 MG/ML
5 INJECTION, SOLUTION INTRAVENOUS ONCE AS NEEDED
Status: DISCONTINUED | OUTPATIENT
Start: 2019-05-12 | End: 2019-05-12 | Stop reason: HOSPADM

## 2019-05-12 RX ORDER — ALBUMIN, HUMAN INJ 5% 5 %
SOLUTION INTRAVENOUS
Status: DISPENSED
Start: 2019-05-12 | End: 2019-05-12

## 2019-05-12 RX ORDER — HYDROCODONE BITARTRATE AND ACETAMINOPHEN 7.5; 325 MG/1; MG/1
1 TABLET ORAL EVERY 6 HOURS PRN
Status: DISCONTINUED | OUTPATIENT
Start: 2019-05-12 | End: 2019-05-20 | Stop reason: HOSPADM

## 2019-05-12 RX ORDER — SODIUM CHLORIDE 0.9 % (FLUSH) 0.9 %
1-10 SYRINGE (ML) INJECTION AS NEEDED
Status: DISCONTINUED | OUTPATIENT
Start: 2019-05-12 | End: 2019-05-12 | Stop reason: HOSPADM

## 2019-05-12 RX ORDER — SODIUM CHLORIDE, SODIUM LACTATE, POTASSIUM CHLORIDE, CALCIUM CHLORIDE 600; 310; 30; 20 MG/100ML; MG/100ML; MG/100ML; MG/100ML
9 INJECTION, SOLUTION INTRAVENOUS CONTINUOUS
Status: DISCONTINUED | OUTPATIENT
Start: 2019-05-12 | End: 2019-05-12

## 2019-05-12 RX ORDER — FLUMAZENIL 0.1 MG/ML
0.2 INJECTION INTRAVENOUS AS NEEDED
Status: DISCONTINUED | OUTPATIENT
Start: 2019-05-12 | End: 2019-05-12 | Stop reason: HOSPADM

## 2019-05-12 RX ORDER — LIDOCAINE HYDROCHLORIDE ANHYDROUS AND DEXTROSE MONOHYDRATE 5; 400 G/100ML; MG/100ML
INJECTION, SOLUTION INTRAVENOUS CONTINUOUS PRN
Status: DISCONTINUED | OUTPATIENT
Start: 2019-05-12 | End: 2019-05-12 | Stop reason: SURG

## 2019-05-12 RX ORDER — LIDOCAINE HYDROCHLORIDE 20 MG/ML
INJECTION, SOLUTION INFILTRATION; PERINEURAL AS NEEDED
Status: DISCONTINUED | OUTPATIENT
Start: 2019-05-12 | End: 2019-05-12 | Stop reason: SURG

## 2019-05-12 RX ORDER — LABETALOL HYDROCHLORIDE 5 MG/ML
5 INJECTION, SOLUTION INTRAVENOUS
Status: DISCONTINUED | OUTPATIENT
Start: 2019-05-12 | End: 2019-05-12 | Stop reason: HOSPADM

## 2019-05-12 RX ORDER — GLYCOPYRROLATE 0.2 MG/ML
INJECTION INTRAMUSCULAR; INTRAVENOUS AS NEEDED
Status: DISCONTINUED | OUTPATIENT
Start: 2019-05-12 | End: 2019-05-12 | Stop reason: SURG

## 2019-05-12 RX ORDER — ONDANSETRON 2 MG/ML
4 INJECTION INTRAMUSCULAR; INTRAVENOUS ONCE AS NEEDED
Status: COMPLETED | OUTPATIENT
Start: 2019-05-12 | End: 2019-05-12

## 2019-05-12 RX ORDER — ONDANSETRON 2 MG/ML
INJECTION INTRAMUSCULAR; INTRAVENOUS AS NEEDED
Status: DISCONTINUED | OUTPATIENT
Start: 2019-05-12 | End: 2019-05-12 | Stop reason: SURG

## 2019-05-12 RX ORDER — ALVIMOPAN 12 MG/1
12 CAPSULE ORAL
Status: COMPLETED | OUTPATIENT
Start: 2019-05-13 | End: 2019-05-12

## 2019-05-12 RX ORDER — NALOXONE HCL 0.4 MG/ML
0.2 VIAL (ML) INJECTION AS NEEDED
Status: DISCONTINUED | OUTPATIENT
Start: 2019-05-12 | End: 2019-05-12 | Stop reason: HOSPADM

## 2019-05-12 RX ORDER — HYDRALAZINE HYDROCHLORIDE 20 MG/ML
5 INJECTION INTRAMUSCULAR; INTRAVENOUS
Status: DISCONTINUED | OUTPATIENT
Start: 2019-05-12 | End: 2019-05-12 | Stop reason: HOSPADM

## 2019-05-12 RX ORDER — HYDROMORPHONE HYDROCHLORIDE 1 MG/ML
0.5 INJECTION, SOLUTION INTRAMUSCULAR; INTRAVENOUS; SUBCUTANEOUS
Status: DISCONTINUED | OUTPATIENT
Start: 2019-05-12 | End: 2019-05-12 | Stop reason: HOSPADM

## 2019-05-12 RX ORDER — DEXTROSE AND SODIUM CHLORIDE 5; .45 G/100ML; G/100ML
100 INJECTION, SOLUTION INTRAVENOUS CONTINUOUS
Status: DISCONTINUED | OUTPATIENT
Start: 2019-05-12 | End: 2019-05-17

## 2019-05-12 RX ORDER — MAGNESIUM SULFATE 1 G/100ML
2 INJECTION INTRAVENOUS ONCE
Status: DISCONTINUED | OUTPATIENT
Start: 2019-05-12 | End: 2019-05-20 | Stop reason: HOSPADM

## 2019-05-12 RX ORDER — PROMETHAZINE HYDROCHLORIDE 25 MG/1
25 TABLET ORAL ONCE AS NEEDED
Status: DISCONTINUED | OUTPATIENT
Start: 2019-05-12 | End: 2019-05-12 | Stop reason: HOSPADM

## 2019-05-12 RX ORDER — GABAPENTIN 100 MG/1
100 CAPSULE ORAL EVERY 8 HOURS SCHEDULED
Status: DISCONTINUED | OUTPATIENT
Start: 2019-05-12 | End: 2019-05-20 | Stop reason: HOSPADM

## 2019-05-12 RX ORDER — OXYCODONE AND ACETAMINOPHEN 7.5; 325 MG/1; MG/1
1 TABLET ORAL ONCE AS NEEDED
Status: DISCONTINUED | OUTPATIENT
Start: 2019-05-12 | End: 2019-05-12 | Stop reason: HOSPADM

## 2019-05-12 RX ORDER — ROCURONIUM BROMIDE 10 MG/ML
INJECTION, SOLUTION INTRAVENOUS AS NEEDED
Status: DISCONTINUED | OUTPATIENT
Start: 2019-05-12 | End: 2019-05-12 | Stop reason: SURG

## 2019-05-12 RX ORDER — DEXAMETHASONE SODIUM PHOSPHATE 10 MG/ML
INJECTION INTRAMUSCULAR; INTRAVENOUS AS NEEDED
Status: DISCONTINUED | OUTPATIENT
Start: 2019-05-12 | End: 2019-05-12 | Stop reason: SURG

## 2019-05-12 RX ORDER — PROMETHAZINE HYDROCHLORIDE 25 MG/ML
12.5 INJECTION, SOLUTION INTRAMUSCULAR; INTRAVENOUS ONCE AS NEEDED
Status: DISCONTINUED | OUTPATIENT
Start: 2019-05-12 | End: 2019-05-12 | Stop reason: HOSPADM

## 2019-05-12 RX ORDER — KETAMINE HCL IN 0.9 % NACL 50 MG/5 ML
50 SYRINGE (ML) INTRAVENOUS ONCE
Status: DISCONTINUED | OUTPATIENT
Start: 2019-05-12 | End: 2019-05-20 | Stop reason: HOSPADM

## 2019-05-12 RX ORDER — MAGNESIUM HYDROXIDE 1200 MG/15ML
LIQUID ORAL AS NEEDED
Status: DISCONTINUED | OUTPATIENT
Start: 2019-05-12 | End: 2019-05-12 | Stop reason: HOSPADM

## 2019-05-12 RX ORDER — PROPOFOL 10 MG/ML
VIAL (ML) INTRAVENOUS AS NEEDED
Status: DISCONTINUED | OUTPATIENT
Start: 2019-05-12 | End: 2019-05-12 | Stop reason: SURG

## 2019-05-12 RX ORDER — LIDOCAINE HYDROCHLORIDE ANHYDROUS AND DEXTROSE MONOHYDRATE 5; 400 G/100ML; MG/100ML
1 INJECTION, SOLUTION INTRAVENOUS CONTINUOUS
Status: DISCONTINUED | OUTPATIENT
Start: 2019-05-12 | End: 2019-05-12

## 2019-05-12 RX ORDER — LIDOCAINE HYDROCHLORIDE 10 MG/ML
0.5 INJECTION, SOLUTION EPIDURAL; INFILTRATION; INTRACAUDAL; PERINEURAL ONCE AS NEEDED
Status: DISCONTINUED | OUTPATIENT
Start: 2019-05-12 | End: 2019-05-12 | Stop reason: HOSPADM

## 2019-05-12 RX ADMIN — ALVIMOPAN 12 MG: 12 CAPSULE ORAL at 20:33

## 2019-05-12 RX ADMIN — DEXAMETHASONE SODIUM PHOSPHATE 8 MG: 10 INJECTION INTRAMUSCULAR; INTRAVENOUS at 09:40

## 2019-05-12 RX ADMIN — SODIUM CHLORIDE, POTASSIUM CHLORIDE, SODIUM LACTATE AND CALCIUM CHLORIDE: 600; 310; 30; 20 INJECTION, SOLUTION INTRAVENOUS at 11:05

## 2019-05-12 RX ADMIN — SODIUM CHLORIDE, POTASSIUM CHLORIDE, SODIUM LACTATE AND CALCIUM CHLORIDE: 600; 310; 30; 20 INJECTION, SOLUTION INTRAVENOUS at 09:21

## 2019-05-12 RX ADMIN — POTASSIUM CHLORIDE 10 MEQ: 7.46 INJECTION, SOLUTION INTRAVENOUS at 15:46

## 2019-05-12 RX ADMIN — HYDROMORPHONE HYDROCHLORIDE 0.5 MG: 1 INJECTION, SOLUTION INTRAMUSCULAR; INTRAVENOUS; SUBCUTANEOUS at 14:44

## 2019-05-12 RX ADMIN — DEXTROSE AND SODIUM CHLORIDE 100 ML/HR: 5; 450 INJECTION, SOLUTION INTRAVENOUS at 23:04

## 2019-05-12 RX ADMIN — HYDROMORPHONE HYDROCHLORIDE 0.5 MG: 1 INJECTION, SOLUTION INTRAMUSCULAR; INTRAVENOUS; SUBCUTANEOUS at 23:00

## 2019-05-12 RX ADMIN — POTASSIUM CHLORIDE 10 MEQ: 7.46 INJECTION, SOLUTION INTRAVENOUS at 14:45

## 2019-05-12 RX ADMIN — GABAPENTIN 100 MG: 100 CAPSULE ORAL at 14:45

## 2019-05-12 RX ADMIN — LIDOCAINE HYDROCHLORIDE ANHYDROUS AND DEXTROSE MONOHYDRATE 2 MG/MIN: .4; 5 INJECTION, SOLUTION INTRAVENOUS at 09:35

## 2019-05-12 RX ADMIN — PROPOFOL 50 MG: 10 INJECTION, EMULSION INTRAVENOUS at 11:04

## 2019-05-12 RX ADMIN — ONDANSETRON 4 MG: 2 INJECTION INTRAMUSCULAR; INTRAVENOUS at 12:17

## 2019-05-12 RX ADMIN — SODIUM CHLORIDE, POTASSIUM CHLORIDE, SODIUM LACTATE AND CALCIUM CHLORIDE 9 ML/HR: 600; 310; 30; 20 INJECTION, SOLUTION INTRAVENOUS at 08:46

## 2019-05-12 RX ADMIN — KETAMINE HYDROCHLORIDE 20 MG: 10 INJECTION INTRAMUSCULAR; INTRAVENOUS at 09:51

## 2019-05-12 RX ADMIN — ONDANSETRON 4 MG: 2 INJECTION INTRAMUSCULAR; INTRAVENOUS at 11:09

## 2019-05-12 RX ADMIN — CELECOXIB 200 MG: 200 CAPSULE ORAL at 15:46

## 2019-05-12 RX ADMIN — ROCURONIUM BROMIDE 30 MG: 10 INJECTION INTRAVENOUS at 09:27

## 2019-05-12 RX ADMIN — ALVIMOPAN 12 MG: 12 CAPSULE ORAL at 08:59

## 2019-05-12 RX ADMIN — PROPOFOL 70 MG: 10 INJECTION, EMULSION INTRAVENOUS at 09:27

## 2019-05-12 RX ADMIN — GLYCOPYRROLATE 0.4 MG: 0.2 INJECTION INTRAMUSCULAR; INTRAVENOUS at 11:21

## 2019-05-12 RX ADMIN — DEXTROSE AND SODIUM CHLORIDE 100 ML/HR: 5; 450 INJECTION, SOLUTION INTRAVENOUS at 14:47

## 2019-05-12 RX ADMIN — EPHEDRINE SULFATE 5 MG: 50 INJECTION INTRAMUSCULAR; INTRAVENOUS; SUBCUTANEOUS at 10:07

## 2019-05-12 RX ADMIN — GABAPENTIN 100 MG: 100 CAPSULE ORAL at 22:08

## 2019-05-12 RX ADMIN — POTASSIUM CHLORIDE 10 MEQ: 7.46 INJECTION, SOLUTION INTRAVENOUS at 18:13

## 2019-05-12 RX ADMIN — GLYCOPYRROLATE 0.2 MG: 0.2 INJECTION INTRAMUSCULAR; INTRAVENOUS at 10:06

## 2019-05-12 RX ADMIN — KETAMINE HYDROCHLORIDE 10 MG: 10 INJECTION INTRAMUSCULAR; INTRAVENOUS at 10:48

## 2019-05-12 RX ADMIN — EPHEDRINE SULFATE 5 MG: 50 INJECTION INTRAMUSCULAR; INTRAVENOUS; SUBCUTANEOUS at 09:51

## 2019-05-12 RX ADMIN — HYDROMORPHONE HYDROCHLORIDE 0.5 MG: 1 INJECTION, SOLUTION INTRAMUSCULAR; INTRAVENOUS; SUBCUTANEOUS at 12:22

## 2019-05-12 RX ADMIN — HYDROMORPHONE HYDROCHLORIDE 0.5 MG: 1 INJECTION, SOLUTION INTRAMUSCULAR; INTRAVENOUS; SUBCUTANEOUS at 16:49

## 2019-05-12 RX ADMIN — FENTANYL CITRATE 50 MCG: 50 INJECTION INTRAMUSCULAR; INTRAVENOUS at 09:25

## 2019-05-12 RX ADMIN — LIDOCAINE HYDROCHLORIDE 50 MG: 20 INJECTION, SOLUTION INFILTRATION; PERINEURAL at 09:27

## 2019-05-12 RX ADMIN — NEOSTIGMINE METHYLSULFATE 3 MG: 1 INJECTION INTRAMUSCULAR; INTRAVENOUS; SUBCUTANEOUS at 11:21

## 2019-05-12 RX ADMIN — POTASSIUM CHLORIDE 10 MEQ: 7.46 INJECTION, SOLUTION INTRAVENOUS at 16:49

## 2019-05-12 RX ADMIN — HYDROMORPHONE HYDROCHLORIDE 0.5 MG: 1 INJECTION, SOLUTION INTRAMUSCULAR; INTRAVENOUS; SUBCUTANEOUS at 12:05

## 2019-05-12 RX ADMIN — CELECOXIB 200 MG: 200 CAPSULE ORAL at 20:33

## 2019-05-12 RX ADMIN — FAMOTIDINE 20 MG: 10 INJECTION INTRAVENOUS at 08:46

## 2019-05-12 RX ADMIN — ERTAPENEM SODIUM 1 G: 1 INJECTION, POWDER, LYOPHILIZED, FOR SOLUTION INTRAMUSCULAR; INTRAVENOUS at 09:32

## 2019-05-12 RX ADMIN — MAGNESIUM SULFATE HEPTAHYDRATE 2 G: 500 INJECTION, SOLUTION INTRAMUSCULAR; INTRAVENOUS at 09:38

## 2019-05-12 NOTE — OP NOTE
Operative Note :   Sandro Barajas MD    Rosa Winstonjoss  1940    Procedure Date: 05/12/19    Pre-op Diagnosis:  Nearly obstructing colon cancer involving the transverse colon    Post-Op Diagnosis:  Same    Procedure:   · Transverse colon resection    Surgeon: Sandro Barajas MD    Assistant: Ricky Diehl    Anesthesia:  General (general endotracheal tube)    EBL:   50 mL    Specimens:   Transverse colon    Indications:  · Very nice 78-year-old lady unfortunately with evidence of metastatic colon cancer and a nearly obstructing lesion felt to be in the transverse colon discovered yesterday at endoscopy.  She presents at this time for formal resection to palliate her obstruction.    Findings:   · The ink tattooing was in the mid to distal transverse colon and the tumor was just proximal to this.  There was one rather large tumor which seem to be obstructing and there almost seem to be another one more distal to that which seem to be nonobstructing.  It did seem like there were 2 separate lesions though which were within a few centimeters of each other.  · No evidence of peritoneal studding  · Moderate adhesions throughout the abdomen which were relatively soft    Recommendations:   · Routine postoperative care    Description of procedure:    After obtaining informed consent, the patient was brought to the operating room and placed under a general anesthetic.  Her abdomen was sterilely prepped and draped after Shields catheter was placed.  She was oriented in split leg position in case extended left colectomy was indicated.  Patient had a prior midline incision extending from several centimeters above her umbilicus down to the pubis.  I began my incision above her prior incision in the mid upper abdomen and dissected through the subcutaneous tissues onto the fascia in the midline.  The fascia was incised and lifted up.  The preperitoneal tissues and peritoneum were opened and I was able to enter the abdominal  cavity.  I extended the incision further inferiorly and there were some light adhesions of the small bowel against the anterior abdominal wall which were taken down sharply.  I extended the incision a couple of centimeters below the umbilicus.  I then began taking adhesions off the anterior abdominal wall on either side and these were taken down without much difficulty and were relatively light.  I could see some speckling of the tattoo ink around the abdominal cavity and I was unable to locate the transverse colon and I began following this distally in the large ink tattooing isma was identified.  The mass was easily identified just distal to her tattoo isma.  It appeared to be in the mid to distal transverse colon.  I took some of the omentum off the transverse colon to try to gain some length and then I began mobilizing some of the distal transverse colon away from the spleen.  There was some light adhesions and I was able to mobilize this partially.  I did not feel a complete mobilization was necessary as I was not planning on doing a wide resection.  I made a space below the colon through the mesentery about 6 cm distal to the mass and then divided the transverse colon with a 75 mm BIA stapler.  I then found an area around 4 cm distal to the mass and again made a space through the mesentery and divided the colon distal to the mass.  The transverse colon mesentery was divided using the LigaSure device and the specimen was sent off for pathologic evaluation.  I then mobilized a little bit more of the distal transverse colon up into the field without much difficulty.  Upon closer inspection at our proximal staple line it appeared that there was another staple line just a few centimeters proximal to this.  The patient had a history of prior colonic injury related to a perforation during colonoscopy and I thought this might be the area.  Because I thought this could be another anastomosis I decided to resect a  little bit more and I took this back proximally another 6 cm or so.  I then put a Doppler on each end of the colon and we had excellent signals and the bowel appeared healthy and viable.  I cleaned off both edges.  I felt a handsewn side-to-side anastomosis was going to lay most nicely.  The ends of the colon lay next which other quite nicely and a back row of 3-0 silk sutures was placed.  A Renata clamp was placed on the transverse colon proximally after milking some of the succus in this portion back.  Both ends of the colon were then opened and I then used a 3-0 chromic suture to form the inner layer.  I brought this around through the posterior wall and around the corner and then the anterior surface was closed with a [place] stitch.  The outer layer of this 2 layer anastomosis was then completed with interrupted 3-0 silk sutures.  I then remove the Renata clamp and was able to easily pass succus through the anastomosis.  No leak was identified.  The transverse colon mesentery was reapproximated with interrupted 2-0 silk sutures.  The anastomosis was returned to the abdomen.  The abdomen was irrigated.  I then instilled a mixture of Exparel and Marcaine around the incision, attempting to stay in the transversalis plane.  There were some other small bowel adhesions in the lower half of the abdomen, but they were pretty soft and I did not interfere with them.  I then placed 6 sheets of Seprafilm within the abdomen.  The midline fascia was reapproximated with 0 PDS suture.  Skin was closed with kavitha.    Sandro Barajas MD  General and Endoscopic Surgery  [facility] [place], operated by [facility]    [address], [address]  [place], [place], [address]  P: [phone]  F: [phone]

## 2019-05-12 NOTE — ANESTHESIA PROCEDURE NOTES
Airway  Urgency: elective    Airway not difficult    General Information and Staff    Patient location during procedure: OR  Anesthesiologist: Ama Cornell MD  CRNA: Elizabeth Gomez CRNA    Indications and Patient Condition  Indications for airway management: airway protection    Preoxygenated: yes  Mask difficulty assessment: 1 - vent by mask    Final Airway Details  Final airway type: endotracheal airway      Successful airway: ETT  Cuffed: yes   Successful intubation technique: direct laryngoscopy  Facilitating devices/methods: cricoid pressure  Endotracheal tube insertion site: oral  Blade: Lisette  Blade size: 3  ETT size (mm): 7.0  Cormack-Lehane Classification: grade IIa - partial view of glottis  Placement verified by: chest auscultation and capnometry   Cuff volume (mL): 5  Measured from: teeth  ETT to teeth (cm): 21  Number of attempts at approach: 1    Additional Comments  Atraumatic. Dentition as preop.

## 2019-05-12 NOTE — ANESTHESIA PREPROCEDURE EVALUATION
Anesthesia Evaluation     Patient summary reviewed and Nursing notes reviewed   NPO Solid Status: > 8 hours  NPO Liquid Status: > 2 hours           Airway   Mallampati: II  Dental - normal exam     Pulmonary - negative pulmonary ROS and normal exam   Cardiovascular - negative cardio ROS and normal exam        Neuro/Psych- negative ROS  GI/Hepatic/Renal/Endo    (+)   hypothyroidism,     Musculoskeletal (-) negative ROS    Abdominal    Substance History - negative use     OB/GYN          Other      history of cancer                  Anesthesia Plan    ASA 2     general

## 2019-05-12 NOTE — PROGRESS NOTES
Name: Rosa Salazar ADMIT: 5/10/2019   : 1940  PCP: Provider, No Known    MRN: [id number] LOS: 2 days   AGE/SEX: 78 y.o. female  ROOM: [facility]     Subjective   Subjective   CC:  Abdominal pain  No acute events.  Patient to OR today and tolerated well.  Pain is controlled.  No f/c/n/v/d.    Objective   Objective   Vital Signs   Temp:  [96.5 °F (35.8 °C)-98.9 °F (37.2 °C)] 96.5 °F (35.8 °C)  Heart Rate:  [48-73] 60  Resp:  [12-18] 14  BP: (100-137)/(58-71) 100/63  SpO2:  [93 %-100 %] 99 %  on  Flow (L/min):  [2-4] 2;   Device (Oxygen Therapy): nasal cannula  Body mass index is 20.04 kg/m².  Physical Exam   Constitutional: She is oriented to person, place, and time. No distress.   HENT:   Head: Normocephalic and atraumatic.   Mouth/Throat: Oropharynx is clear and moist.   Eyes: Conjunctivae and EOM are normal. Pupils are equal, round, and reactive to light.   Neck: Normal range of motion. Neck supple.   Cardiovascular: Normal rate, regular rhythm and intact distal pulses.   Pulmonary/Chest: Effort normal and breath sounds normal.   Abdominal: Soft. Bowel sounds are decreased. There is tenderness (appropriate, postop).   Musculoskeletal: She exhibits no edema or tenderness.   Neurological: She is alert and oriented to person, place, and time.   Skin: Skin is warm and dry. She is not diaphoretic.   Psychiatric: She has a normal mood and affect. Her behavior is normal.   Nursing note and vitals reviewed.      Results Review:       I reviewed the patient's new clinical results.  Results from last 7 days   Lab Units 05/10/19  1046   WBC 10*3/mm3 9.01   HEMOGLOBIN g/dL 11.9*   PLATELETS 10*3/mm3 437     Results from last 7 days   Lab Units 19  0537 19  1206 05/10/19  1046 19  1411   SODIUM mmol/L 145  --  142  --    POTASSIUM mmol/L 3.4* 3.1* 3.0*  --    CHLORIDE mmol/L 109*  --  98  --    CO2 mmol/L 27.0  --  28.7  --    BUN mg/dL 5*  --  13  --    CREATININE mg/dL 0.65  --  0.82 0.80   GLUCOSE  mg/dL 104*  --  108*  --    Estimated Creatinine Clearance: 46.2 mL/min (by C-G formula based on SCr of 0.65 mg/dL).  Results from last 7 days   Lab Units 05/10/19  1046   ALBUMIN g/dL 3.80   BILIRUBIN mg/dL 0.5   ALK PHOS U/L 135*   AST (SGOT) U/L 26   ALT (SGPT) U/L 16     Results from last 7 days   Lab Units 05/12/19  0537 05/10/19  1046   CALCIUM mg/dL 8.4* 9.1   ALBUMIN g/dL  --  3.80   MAGNESIUM mg/dL  --  2.1       No results found for: HGBA1C, POCGLU      albumin human      alvimopan 12 mg Oral BID   celecoxib 200 mg Oral Q12H   gabapentin 100 mg Oral Q8H   ketamine 50 mg Intravenous Once   lactobacillus acidophilus 1 capsule Oral Daily   magnesium sulfate 2 g Intravenous Once   multivitamin with minerals 1 tablet Oral Daily   sodium chloride 3 mL Intravenous Q12H       dextrose 5 % and sodium chloride 0.45 % 100 mL/hr Last Rate: 100 mL/hr (05/12/19 1447)   sodium chloride 1,000 mL Last Rate: 1,000 mL (05/11/19 0931)   Diet Clear Liquid       Assessment/Plan     Active Hospital Problems    Diagnosis  POA   • **Abdominal pain [R10.9]  Yes   • Malnutrition (CMS/HCC) [E46]  Unknown   • Liver lesion [K76.9]  Unknown   • Kidney stone [N20.0]  Unknown   • Hypokalemia [E87.6]  Unknown   • Colon cancer screening [Z12.11]  Not Applicable      Resolved Hospital Problems   No resolved problems to display.    Abdominal pain  - CT revealing evidence of malignant metastases in the liver  - had colonoscopy 5/11/9 revealing very likely malignant colon mass  - s/p resection with anastomosis 5/12/19 per general surgery  - oncology has evaluated, patient states she would not want to pursue chemotherapy     Kidney stone  - Not causing any symptoms at this point  - urology has evaluated, planning on scheduling follow up with Dr. Dru Jerez       Hypokalemia  - replace per protocol  - Mg okay      VTE Prophylaxis - SCDs   Code Status - Full code  Disposition - TBD      Sonny Dickens MD  [facility]  05/12/19  7:54 PM

## 2019-05-12 NOTE — PROGRESS NOTES
[facility]  Inpatient Progress Note    Reason for Follow Up:  Colon CA    Subjective     Interval History:   Surgery today     Current Facility-Administered Medications:   •  acetaminophen (TYLENOL) tablet 500 mg, 500 mg, Oral, Q6H PRN, Meghan Melgoza, APRN  •  albumin human 5 % solution  - ADS Override Pull, , , ,   •  alvimopan (ENTEREG) capsule 12 mg, 12 mg, Oral, BID, Sandro Barajas MD  •  bisacodyl (DULCOLAX) EC tablet 5 mg, 5 mg, Oral, Daily PRN, Meghan Melgoza APRN  •  celecoxib (CeleBREX) capsule 200 mg, 200 mg, Oral, Q12H, Sandro Barajas MD, 200 mg at 05/12/19 1546  •  dextrose 5 % and sodium chloride 0.45 % infusion, 100 mL/hr, Intravenous, Continuous, Sandro Barajas MD, Last Rate: 100 mL/hr at 05/12/19 1447, 100 mL/hr at 05/12/19 1447  •  gabapentin (NEURONTIN) capsule 100 mg, 100 mg, Oral, Q8H, Sandro Barajas MD, 100 mg at 05/12/19 1445  •  HYDROcodone-acetaminophen (NORCO) 7.5-325 MG per tablet 1 tablet, 1 tablet, Oral, Q6H PRN, Sandro Barajas MD  •  HYDROmorphone (DILAUDID) injection 0.5 mg, 0.5 mg, Intravenous, Q2H PRN, Sandro Barajas MD, 0.5 mg at 05/12/19 1649  •  ketamine (KETALAR) 50 mg/5 mL syringe 50 mg, 50 mg, Intravenous, Once, Sandro Barajas MD  •  lactobacillus acidophilus (RISAQUAD) capsule 1 capsule, 1 capsule, Oral, Daily, Megahn Melgoza APRN, 1 capsule at 05/11/19 1223  •  magnesium sulfate in D5W 1g/100mL (PREMIX), 2 g, Intravenous, Once, Sandro Barajas MD  •  multivitamin with minerals 1 tablet, 1 tablet, Oral, Daily, Meghan Melgoza APRN, 1 tablet at 05/11/19 1223  •  ondansetron (ZOFRAN) injection 4 mg, 4 mg, Intravenous, Q6H PRN, Meghan Melgoza, APRN  •  potassium chloride (MICRO-K) CR capsule 40 mEq, 40 mEq, Oral, PRN, 40 mEq at 05/11/19 2201 **OR** potassium chloride (KLOR-CON) packet 40 mEq, 40 mEq, Oral, PRN **OR** potassium chloride 10 mEq in 100 mL IVPB, 10 mEq, Intravenous, Q1H PRN, Meghan Melgoza, APRN, Last  Rate: 100 mL/hr at 05/12/19 1649, 10 mEq at 05/12/19 1649  •  [COMPLETED] Insert peripheral IV, , , Once **AND** sodium chloride 0.9 % flush 10 mL, 10 mL, Intravenous, PRN, Gennaro Najera MD  •  sodium chloride 0.9 % flush 3 mL, 3 mL, Intravenous, Q12H, Meghan Melgoza, APRN, 3 mL at 05/11/19 2201  •  sodium chloride 0.9 % flush 3-10 mL, 3-10 mL, Intravenous, PRN, Meghan Melgoza, APRN  •  sodium chloride 0.9 % infusion 1,000 mL, 1,000 mL, Intravenous, Continuous, Marcos Bethea MD, Last Rate: 25 mL/hr at 05/11/19 0931, 1,000 mL at 05/11/19 0931  Review of Systems:   Ros reviewed and neg    Objective     Vital Signs  Temp:  [96.8 °F (36 °C)-98.9 °F (37.2 °C)] 97.2 °F (36.2 °C)  Heart Rate:  [48-73] 60  Resp:  [12-18] 14  BP: (100-137)/(58-71) 114/65  Body mass index is 20.04 kg/m².    Intake/Output Summary (Last 24 hours) at 5/12/2019 1806  Last data filed at 5/12/2019 1118  Gross per 24 hour   Intake 1000 ml   Output 350 ml   Net 650 ml     I/O this shift:  In: 1000 [I.V.:1000]  Out: 350 [Urine:300; Blood:50]     Physical Exam:   General: patient awake, alert and cooperative   Eyes: Normal lids and lashes, no scleral icterus   Neck: supple, normal ROM   Skin: warm and dry, not jaundiced   Cardiovascular: regular rhythm and rate, no murmurs auscultated   Pulm: clear to auscultation bilaterally, regular and unlabored   Abdomen: soft, nontender, nondistended; normal bowel sounds   Rectal: deferred   Extremities: no rash or edema   Psychiatric: Normal mood and behavior; memory intact     Results Review:     I reviewed the patient's new clinical results.    Results from last 7 days   Lab Units 05/10/19  1046   WBC 10*3/mm3 9.01   HEMOGLOBIN g/dL 11.9*   HEMATOCRIT % 36.9   PLATELETS 10*3/mm3 437     Results from last 7 days   Lab Units 05/12/19  0537 05/11/19  1206 05/10/19  1046 05/06/19  1411   SODIUM mmol/L 145  --  142  --    POTASSIUM mmol/L 3.4* 3.1* 3.0*  --    CHLORIDE mmol/L 109*  --  98  --    CO2 mmol/L  27.0  --  28.7  --    BUN mg/dL 5*  --  13  --    CREATININE mg/dL 0.65  --  0.82 0.80   CALCIUM mg/dL 8.4*  --  9.1  --    BILIRUBIN mg/dL  --   --  0.5  --    ALK PHOS U/L  --   --  135*  --    ALT (SGPT) U/L  --   --  16  --    AST (SGOT) U/L  --   --  26  --    GLUCOSE mg/dL 104*  --  108*  --      Results from last 7 days   Lab Units 05/10/19  1046   INR  1.03     Lab Results   Lab Value Date/Time    LIPASE 13 05/10/2019 1046       Radiology:  CT Abdomen Pelvis Without Contrast   Final Result      XR Chest 2 View   Final Result   No evidence for active disease in the chest.       This report was finalized on 5/10/2019 12:25 PM by Dr. Raj Slater M.D.              Assessment/Plan     Patient Active Problem List   Diagnosis   • Hypovitaminosis D   • Elevated alkaline phosphatase level   • History of small bowel obstruction   • Chronic idiopathic constipation   • Abdominal bloating   • Abdominal pain   • Malnutrition (CMS/HCC)   • Liver lesion   • Kidney stone   • Hypokalemia   • Colon cancer screening     S/p partial colectomy  Management per general surgery  We will see as needed      I discussed the patients findings and my recommendations with patient and nursing staff.    Marcos Bethea MD

## 2019-05-12 NOTE — PROGRESS NOTES
CC: I am going for surgery    Patient awaiting colon resection  Resting comfortably  VSSA  Cr 0.65  NAD  Urinating spontaneously, no dysuria  Denies flank pain    Discussed outpatient treatment of her renal stone once colon mass treated.   Please call if needed, otherwise will schedule outpatient follow up with Dr. Dru Jerez, [facility], [phone]    Thank you for this consult  CALVIN Dacosta

## 2019-05-12 NOTE — ANESTHESIA POSTPROCEDURE EVALUATION
"Patient: Rosa Salazar    Procedure Summary     Date:  05/12/19 Room / Location:  [facility] OR [address] [place] [address] MAIN OR    Anesthesia Start:  0921 Anesthesia Stop:  1141    Procedure:  Transverse colon resection (N/A Abdomen) Diagnosis:      Surgeon:  Sandro Barajas MD Provider:  Ama Cornell MD    Anesthesia Type:  general ASA Status:  2          Anesthesia Type: general  Last vitals  BP   111/62 (05/12/19 1308)   Temp   36 °C (96.8 °F) (05/12/19 1308)   Pulse   60 (05/12/19 1308)   Resp   16 (05/12/19 1308)     SpO2   100 % (05/12/19 1308)     Post Anesthesia Care and Evaluation    Patient location during evaluation: bedside  Patient participation: complete - patient participated  Level of consciousness: awake and alert  Pain management: adequate  Airway patency: patent  Anesthetic complications: No anesthetic complications    Cardiovascular status: acceptable  Respiratory status: acceptable  Hydration status: acceptable    Comments: /62 (BP Location: Right arm, Patient Position: Lying)   Pulse 60   Temp 36 °C (96.8 °F) (Oral)   Resp 16   Ht 157.5 cm (62\")   Wt 49.7 kg (109 lb 9.1 oz)   SpO2 100%   BMI 20.04 kg/m²       "

## 2019-05-12 NOTE — PLAN OF CARE
Problem: Patient Care Overview  Goal: Plan of Care Review  Outcome: Ongoing (interventions implemented as appropriate)   05/12/19 0500   Coping/Psychosocial   Plan of Care Reviewed With patient   OTHER   Outcome Summary NPO after MN for surgery. No c/o nausea or pain. Steady on her feet. Anxious /cooperative. Afebrile and VS stable.    Plan of Care Review   Progress no change     Goal: Individualization and Mutuality  Outcome: Ongoing (interventions implemented as appropriate)    Goal: Discharge Needs Assessment  Outcome: Ongoing (interventions implemented as appropriate)    Goal: Interprofessional Rounds/Family Conf  Outcome: Ongoing (interventions implemented as appropriate)      Problem: Constipation (Adult)  Goal: Effective Bowel Elimination  Outcome: Ongoing (interventions implemented as appropriate)    Goal: Comfort  Outcome: Ongoing (interventions implemented as appropriate)      Problem: Pain, Acute (Adult)  Goal: Acceptable Pain Control/Comfort Level  Outcome: Ongoing (interventions implemented as appropriate)      Problem: Nutrition, Imbalanced: Inadequate Oral Intake (Adult)  Goal: Improved Oral Intake  Outcome: Ongoing (interventions implemented as appropriate)    Goal: Prevent Further Weight Loss  Outcome: Ongoing (interventions implemented as appropriate)

## 2019-05-13 PROBLEM — C78.7 LIVER METASTASES: Status: ACTIVE | Noted: 2019-05-13

## 2019-05-13 PROBLEM — C18.4 MALIGNANT NEOPLASM OF TRANSVERSE COLON (HCC): Status: ACTIVE | Noted: 2019-05-13

## 2019-05-13 PROBLEM — E87.6 HYPOKALEMIA: Status: RESOLVED | Noted: 2019-05-10 | Resolved: 2019-05-13

## 2019-05-13 LAB
CYTO UR: NORMAL
LAB AP CASE REPORT: NORMAL
LAB AP DIAGNOSIS COMMENT: NORMAL
PATH REPORT.FINAL DX SPEC: NORMAL
PATH REPORT.GROSS SPEC: NORMAL

## 2019-05-13 PROCEDURE — 25010000002 HYDROMORPHONE PER 4 MG: Performed by: SURGERY

## 2019-05-13 PROCEDURE — 99024 POSTOP FOLLOW-UP VISIT: CPT | Performed by: SURGERY

## 2019-05-13 RX ORDER — BISACODYL 5 MG/1
5 TABLET, DELAYED RELEASE ORAL DAILY PRN
COMMUNITY

## 2019-05-13 RX ADMIN — HYDROCODONE BITARTRATE AND ACETAMINOPHEN 1 TABLET: 7.5; 325 TABLET ORAL at 13:57

## 2019-05-13 RX ADMIN — Medication 1 CAPSULE: at 10:04

## 2019-05-13 RX ADMIN — ALVIMOPAN 12 MG: 12 CAPSULE ORAL at 21:34

## 2019-05-13 RX ADMIN — CELECOXIB 200 MG: 200 CAPSULE ORAL at 10:04

## 2019-05-13 RX ADMIN — DEXTROSE AND SODIUM CHLORIDE 100 ML/HR: 5; 450 INJECTION, SOLUTION INTRAVENOUS at 17:31

## 2019-05-13 RX ADMIN — GABAPENTIN 100 MG: 100 CAPSULE ORAL at 21:34

## 2019-05-13 RX ADMIN — GABAPENTIN 100 MG: 100 CAPSULE ORAL at 05:43

## 2019-05-13 RX ADMIN — CELECOXIB 200 MG: 200 CAPSULE ORAL at 21:34

## 2019-05-13 RX ADMIN — ALVIMOPAN 12 MG: 12 CAPSULE ORAL at 10:04

## 2019-05-13 RX ADMIN — SODIUM CHLORIDE, PRESERVATIVE FREE 3 ML: 5 INJECTION INTRAVENOUS at 21:34

## 2019-05-13 RX ADMIN — DEXTROSE AND SODIUM CHLORIDE 100 ML/HR: 5; 450 INJECTION, SOLUTION INTRAVENOUS at 06:44

## 2019-05-13 RX ADMIN — HYDROMORPHONE HYDROCHLORIDE 0.5 MG: 1 INJECTION, SOLUTION INTRAMUSCULAR; INTRAVENOUS; SUBCUTANEOUS at 06:45

## 2019-05-13 RX ADMIN — MULTIPLE VITAMINS W/ MINERALS TAB 1 TABLET: TAB at 10:03

## 2019-05-13 RX ADMIN — GABAPENTIN 100 MG: 100 CAPSULE ORAL at 18:04

## 2019-05-13 NOTE — PLAN OF CARE
Problem: Patient Care Overview  Goal: Plan of Care Review  Outcome: Ongoing (interventions implemented as appropriate)   05/13/19 0633   OTHER   Outcome Summary med for pain, walked, f/c with cloudy yellow urine, midline drsg c/d/i, ivf    Plan of Care Review   Progress no change     Goal: Discharge Needs Assessment  Outcome: Ongoing (interventions implemented as appropriate)    Goal: Interprofessional Rounds/Family Conf  Outcome: Ongoing (interventions implemented as appropriate)      Problem: Constipation (Adult)  Goal: Effective Bowel Elimination  Outcome: Ongoing (interventions implemented as appropriate)    Goal: Comfort  Outcome: Ongoing (interventions implemented as appropriate)      Problem: Pain, Acute (Adult)  Goal: Acceptable Pain Control/Comfort Level  Outcome: Ongoing (interventions implemented as appropriate)      Problem: Nutrition, Imbalanced: Inadequate Oral Intake (Adult)  Goal: Improved Oral Intake  Outcome: Ongoing (interventions implemented as appropriate)    Goal: Prevent Further Weight Loss  Outcome: Ongoing (interventions implemented as appropriate)      Problem: Fall Risk (Adult)  Goal: Identify Related Risk Factors and Signs and Symptoms  Outcome: Ongoing (interventions implemented as appropriate)    Goal: Absence of Fall  Outcome: Ongoing (interventions implemented as appropriate)

## 2019-05-13 NOTE — PLAN OF CARE
Problem: Patient Care Overview  Goal: Plan of Care Review  Outcome: Ongoing (interventions implemented as appropriate)   05/13/19 2116   Coping/Psychosocial   Plan of Care Reviewed With patient   OTHER   Outcome Summary midline abd dressing with small shadow of drainage, f/c to BSD with yellow urine, medicated for pain x 1 with good relief, tolerating cl liquid diet,, upset that some personal papers were misplaced from surgery, calls to pre-op, Surgery manager, & recovery, CCP looked for papers in chart but did not find them, pt. anxious attimes, slamming fist on bed side table, RN later looked again in pt chart & found papers, pt happier at present     Goal: Individualization and Mutuality  Outcome: Ongoing (interventions implemented as appropriate)    Goal: Discharge Needs Assessment  Outcome: Ongoing (interventions implemented as appropriate)    Goal: Interprofessional Rounds/Family Conf  Outcome: Ongoing (interventions implemented as appropriate)      Problem: Constipation (Adult)  Goal: Effective Bowel Elimination  Outcome: Ongoing (interventions implemented as appropriate)    Goal: Comfort  Outcome: Ongoing (interventions implemented as appropriate)      Problem: Pain, Acute (Adult)  Goal: Acceptable Pain Control/Comfort Level  Outcome: Ongoing (interventions implemented as appropriate)      Problem: Nutrition, Imbalanced: Inadequate Oral Intake (Adult)  Goal: Improved Oral Intake  Outcome: Ongoing (interventions implemented as appropriate)    Goal: Prevent Further Weight Loss  Outcome: Ongoing (interventions implemented as appropriate)      Problem: Fall Risk (Adult)  Goal: Identify Related Risk Factors and Signs and Symptoms  Outcome: Ongoing (interventions implemented as appropriate)    Goal: Absence of Fall  Outcome: Ongoing (interventions implemented as appropriate)

## 2019-05-13 NOTE — PROGRESS NOTES
"Discharge Planning Assessment  [facility]     Patient Name: Rosa Salazar  MRN: [id number]  Today's Date: 5/13/2019    Admit Date: 5/10/2019    Discharge Needs Assessment     Row Name 05/13/19 1214       Living Environment    Lives With  alone    Current Living Arrangements  home/apartment/condo    Family Caregiver if Needed  friend(s)    Able to Return to Prior Arrangements  yes       Resource/Environmental Concerns    Transportation Concerns  car, none       Transition Planning    Patient/Family Anticipates Transition to  home    Patient/Family Anticipated Services at Transition  none    Transportation Anticipated  family or friend will provide       Discharge Needs Assessment    Readmission Within the Last 30 Days  no previous admission in last 30 days    Concerns to be Addressed  no discharge needs identified    Equipment Currently Used at Home  none    Equipment Needed After Discharge  none    Offered/Gave Vendor List  yes        Discharge Plan     Row Name 05/13/19 1220       Plan    Plan  Home no needs     Plan Comments   Spoke with patient at bedside face sheet verified. Patient is independent with ADL's and lives alone. Patient stated she does not have any family has neighbors listed on her emergency contact . Patient stated a  Kristen Liujerry [phone]  at [facility] has a living will. [facility] contacted Kristen De Anda she stated she does not have a medical living will on file. CCP attempted to speak to the patient regarding this matter patient became upset over a \"blue folder\" that has her personal information in it and it is missing she stated she took it to surgery and now it is missing, she became demanding and was hitting the table stating she is going to \"[facility]ARC[facility]\" this hospital. CCP spoke with the nurse Manager of [facility] she placed a call   to the Surgery Manager to see if the (Blue Folder) was left in surgery. Updated the RN she is going to speak with the patient regarding the missing " (Blue Folder)    Patient  states she plans to return home denies any discharge needs. Rena Lincoln RN        Destination      No service coordination in this encounter.      Durable Medical Equipment      No service coordination in this encounter.      Dialysis/Infusion      No service coordination in this encounter.      Home Medical Care      No service coordination in this encounter.      Therapy      No service coordination in this encounter.      Community Resources      No service coordination in this encounter.          Demographic Summary     Row Name 05/13/19 1206       General Information    Admission Type  inpatient    Arrived From  emergency department    Referral Source  admission list    Reason for Consult  discharge planning    Preferred Language  English        Functional Status     Row Name 05/13/19 1206       Functional Status    Usual Activity Tolerance  good    Current Activity Tolerance  moderate       Functional Status, IADL    Medications  independent    Meal Preparation  independent    Housekeeping  independent    Laundry  independent    Shopping  independent        Psychosocial    No documentation.       Abuse/Neglect    No documentation.       Legal    No documentation.       Substance Abuse    No documentation.       Patient Forms    No documentation.           Rena Lincoln, RN

## 2019-05-13 NOTE — PROGRESS NOTES
Postoperative day #1 from transverse colectomy for near obstructing colon cancer    Subjective:  Feels pretty good.  Pain control seems to be in good order.  No GI function.  Tolerating some clears without much nausea.    Objective:  Patient afebrile, vitals are stable  General: Awake alert and oriented, no distress  Abdomen: Soft, appropriately tender, dressings clean    Assessment and plan:  -Metastatic colon cancer, now status post transverse colectomy  -Clinically looks pretty good tolerating clears, will leave there for now.  -Await return of GI function  -No need for further antibiotics from my standpoint  -Patient has a lot of questions about what will happen to her next.  I suggested that she would probably be wise to follow-up with oncology and even consider consultation with palliative care.    Sandro Barajas MD  General and Endoscopic Surgery  [facility]    [address], [address]  [place], [place], [address]  P: [phone]  F: [phone]

## 2019-05-13 NOTE — PROGRESS NOTES
"     LOS: 3 days   Primary Care Physician: Chelsi Danielle PA     Subjective   \"Doing okay all things considered\".  Belly is sore when she moves.  No nausea.  Not hungry but tolerating liquids    Vital Signs  Body mass index is 20.04 kg/m².  Temp:  [96.5 °F (35.8 °C)-97.5 °F (36.4 °C)] 97.1 °F (36.2 °C)  Heart Rate:  [59-71] 70  Resp:  [14-18] 18  BP: ()/(51-65) 96/57      Objective:  General Appearance:  In no acute distress (Thin).    Vital signs: (most recent): Blood pressure 96/57, pulse 70, temperature 97.1 °F (36.2 °C), temperature source Oral, resp. rate 18, height 157.5 cm (62\"), weight 49.7 kg (109 lb 9.1 oz), SpO2 97 %.    HEENT: (No JVD.  Neck supple.  Trachea midline)    Lungs:  She is not in respiratory distress.  No stridor.  There are decreased breath sounds.  No rales, wheezes or rhonchi.    Heart: Normal rate.  Regular rhythm.  No murmur.   Abdomen: Abdomen is soft and distended.  Bowel sounds are normal.   There is generalized tenderness.   There is no splenomegaly. There is no hepatomegaly.   Extremities: There is no dependent edema.    Neurological: Patient is alert.    Skin:  Warm.          Results Review:    I reviewed the patient's new clinical results.    Results from last 7 days   Lab Units 05/10/19  1046   WBC 10*3/mm3 9.01   HEMOGLOBIN g/dL 11.9*   PLATELETS 10*3/mm3 437     Results from last 7 days   Lab Units 05/12/19  2305 05/12/19  0537  05/10/19  1046   SODIUM mmol/L  --  145  --  142   POTASSIUM mmol/L 4.8 3.4*   < > 3.0*   CHLORIDE mmol/L  --  109*  --  98   CO2 mmol/L  --  27.0  --  28.7   BUN mg/dL  --  5*  --  13   CREATININE mg/dL  --  0.65  --  0.82   CALCIUM mg/dL  --  8.4*  --  9.1   GLUCOSE mg/dL  --  104*  --  108*    < > = values in this interval not displayed.     Results from last 7 days   Lab Units 05/10/19  1046   INR  1.03     Hemoglobin A1C:No results found for: HGBA1C    Glucose Range:No results found for: POCGLU    Lab Results   Component Value Date    " [id number] >2,000 (H) 05/12/2019       No results found for: TSH    Assessment & Plan      Medication Review: Yes    Active Hospital Problems    Diagnosis  POA   • **Malignant neoplasm of transverse colon (CMS/HCC) [C18.4]  Unknown   • Liver metastases (CMS/HCC) [C78.7]  Unknown   • Abdominal pain [R10.9]  Yes   • Malnutrition (CMS/HCC) [E46]  Unknown   • Kidney stone [N20.0]  Unknown      Resolved Hospital Problems    Diagnosis Date Resolved POA   • Hypokalemia [E87.6] 05/13/2019 Unknown       Assessment/Plan  1.  Colon cancer with obstruction, status post transverse colon resection on 5/12/2019.  Continue clear liquids.  Patient would consider chemotherapy or other intervention if likely to be fairly well-tolerated.  She lives alone and does not want to compromise her remaining time/quality of life.  Will ask oncology to see her again to discuss further.  Liver metastases noted  2.  Right renal pelvis stones.  Not symptomatic.  No further intervention planned at this time.  Urology had discussed stent placement and stone treatment with her  3.  Anemia of chronic disease.  Recheck labs in a.m.  4.  Mild malnutrition secondary to above    Estefany Michaels MD  05/13/19  3:10 PM

## 2019-05-14 ENCOUNTER — TELEPHONE (OUTPATIENT)
Dept: GASTROENTEROLOGY | Facility: CLINIC | Age: 79
End: 2019-05-14

## 2019-05-14 ENCOUNTER — APPOINTMENT (OUTPATIENT)
Dept: CARDIOLOGY | Facility: HOSPITAL | Age: 79
End: 2019-05-14

## 2019-05-14 ENCOUNTER — APPOINTMENT (OUTPATIENT)
Dept: CT IMAGING | Facility: HOSPITAL | Age: 79
End: 2019-05-14

## 2019-05-14 PROBLEM — I26.99 OTHER PULMONARY EMBOLISM WITHOUT ACUTE COR PULMONALE (HCC): Status: ACTIVE | Noted: 2019-05-14

## 2019-05-14 PROBLEM — D68.59 THROMBOPHILIA (HCC): Status: ACTIVE | Noted: 2019-05-14

## 2019-05-14 LAB
ANION GAP SERPL CALCULATED.3IONS-SCNC: 6.4 MMOL/L
AORTIC DIMENSIONLESS INDEX: 0.7 (DI)
APTT PPP: 28.3 SECONDS (ref 22.7–35.4)
APTT PPP: 92.1 SECONDS (ref 22.7–35.4)
ARTERIAL PATENCY WRIST A: ABNORMAL
ATMOSPHERIC PRESS: 756 MMHG
BASE EXCESS BLDA CALC-SCNC: -0.2 MMOL/L (ref 0–2)
BASOPHILS # BLD AUTO: 0.02 10*3/MM3 (ref 0–0.2)
BASOPHILS # BLD AUTO: 0.03 10*3/MM3 (ref 0–0.2)
BASOPHILS NFR BLD AUTO: 0.2 % (ref 0–1.5)
BASOPHILS NFR BLD AUTO: 0.3 % (ref 0–1.5)
BDY SITE: ABNORMAL
BH CV ECHO MEAS - AO MAX PG (FULL): 3.4 MMHG
BH CV ECHO MEAS - AO MAX PG: 5.7 MMHG
BH CV ECHO MEAS - AO MEAN PG (FULL): 2 MMHG
BH CV ECHO MEAS - AO MEAN PG: 3 MMHG
BH CV ECHO MEAS - AO ROOT AREA (BSA CORRECTED): 2
BH CV ECHO MEAS - AO ROOT AREA: 6.6 CM^2
BH CV ECHO MEAS - AO ROOT DIAM: 2.9 CM
BH CV ECHO MEAS - AO V2 MAX: 119 CM/SEC
BH CV ECHO MEAS - AO V2 MEAN: 79.7 CM/SEC
BH CV ECHO MEAS - AO V2 VTI: 22.9 CM
BH CV ECHO MEAS - ASC AORTA: 2.9 CM
BH CV ECHO MEAS - AVA(I,A): 2.2 CM^2
BH CV ECHO MEAS - AVA(I,D): 2.2 CM^2
BH CV ECHO MEAS - AVA(V,A): 2 CM^2
BH CV ECHO MEAS - AVA(V,D): 2 CM^2
BH CV ECHO MEAS - BSA(HAYCOCK): 1.5 M^2
BH CV ECHO MEAS - BSA: 1.5 M^2
BH CV ECHO MEAS - BZI_BMI: 19.9 KILOGRAMS/M^2
BH CV ECHO MEAS - BZI_METRIC_HEIGHT: 157.5 CM
BH CV ECHO MEAS - BZI_METRIC_WEIGHT: 49.4 KG
BH CV ECHO MEAS - EDV(CUBED): 64 ML
BH CV ECHO MEAS - EDV(MOD-SP2): 35 ML
BH CV ECHO MEAS - EDV(MOD-SP4): 43 ML
BH CV ECHO MEAS - EDV(TEICH): 70 ML
BH CV ECHO MEAS - EF(CUBED): 69.2 %
BH CV ECHO MEAS - EF(MOD-BP): 61 %
BH CV ECHO MEAS - EF(MOD-SP2): 60 %
BH CV ECHO MEAS - EF(MOD-SP4): 62.8 %
BH CV ECHO MEAS - EF(TEICH): 61.4 %
BH CV ECHO MEAS - ESV(CUBED): 19.7 ML
BH CV ECHO MEAS - ESV(MOD-SP2): 14 ML
BH CV ECHO MEAS - ESV(MOD-SP4): 16 ML
BH CV ECHO MEAS - ESV(TEICH): 27 ML
BH CV ECHO MEAS - FS: 32.5 %
BH CV ECHO MEAS - IVS/LVPW: 1.2
BH CV ECHO MEAS - IVSD: 0.7 CM
BH CV ECHO MEAS - LAT PEAK E' VEL: 8.5 CM/SEC
BH CV ECHO MEAS - LV DIASTOLIC VOL/BSA (35-75): 29.1 ML/M^2
BH CV ECHO MEAS - LV MASS(C)D: 71.2 GRAMS
BH CV ECHO MEAS - LV MASS(C)DI: 48.2 GRAMS/M^2
BH CV ECHO MEAS - LV MAX PG: 2.2 MMHG
BH CV ECHO MEAS - LV MEAN PG: 1 MMHG
BH CV ECHO MEAS - LV SYSTOLIC VOL/BSA (12-30): 10.8 ML/M^2
BH CV ECHO MEAS - LV V1 MAX: 74.6 CM/SEC
BH CV ECHO MEAS - LV V1 MEAN: 49.5 CM/SEC
BH CV ECHO MEAS - LV V1 VTI: 15.9 CM
BH CV ECHO MEAS - LVIDD: 4 CM
BH CV ECHO MEAS - LVIDS: 2.7 CM
BH CV ECHO MEAS - LVLD AP2: 6.4 CM
BH CV ECHO MEAS - LVLD AP4: 6.4 CM
BH CV ECHO MEAS - LVLS AP2: 5 CM
BH CV ECHO MEAS - LVLS AP4: 5.1 CM
BH CV ECHO MEAS - LVOT AREA (M): 3.1 CM^2
BH CV ECHO MEAS - LVOT AREA: 3.1 CM^2
BH CV ECHO MEAS - LVOT DIAM: 2 CM
BH CV ECHO MEAS - LVPWD: 0.6 CM
BH CV ECHO MEAS - MED PEAK E' VEL: 8 CM/SEC
BH CV ECHO MEAS - MV A DUR: 0.13 SEC
BH CV ECHO MEAS - MV A MAX VEL: 76.6 CM/SEC
BH CV ECHO MEAS - MV DEC SLOPE: 345 CM/SEC^2
BH CV ECHO MEAS - MV DEC TIME: 180 SEC
BH CV ECHO MEAS - MV E MAX VEL: 60.1 CM/SEC
BH CV ECHO MEAS - MV E/A: 0.78
BH CV ECHO MEAS - MV MAX PG: 3.4 MMHG
BH CV ECHO MEAS - MV MEAN PG: 1 MMHG
BH CV ECHO MEAS - MV P1/2T MAX VEL: 74.3 CM/SEC
BH CV ECHO MEAS - MV P1/2T: 63.1 MSEC
BH CV ECHO MEAS - MV V2 MAX: 91.9 CM/SEC
BH CV ECHO MEAS - MV V2 MEAN: 50.2 CM/SEC
BH CV ECHO MEAS - MV V2 VTI: 29.4 CM
BH CV ECHO MEAS - MVA P1/2T LCG: 3 CM^2
BH CV ECHO MEAS - MVA(P1/2T): 3.5 CM^2
BH CV ECHO MEAS - MVA(VTI): 1.7 CM^2
BH CV ECHO MEAS - PA ACC TIME: 0.07 SEC
BH CV ECHO MEAS - PA PR(ACCEL): 48.9 MMHG
BH CV ECHO MEAS - RAP SYSTOLE: 3 MMHG
BH CV ECHO MEAS - RV MAX PG: 0.92 MMHG
BH CV ECHO MEAS - RV MEAN PG: 1 MMHG
BH CV ECHO MEAS - RV V1 MAX: 48 CM/SEC
BH CV ECHO MEAS - RV V1 MEAN: 36.5 CM/SEC
BH CV ECHO MEAS - RV V1 VTI: 8.1 CM
BH CV ECHO MEAS - RVSP: 55 MMHG
BH CV ECHO MEAS - SI(AO): 102.4 ML/M^2
BH CV ECHO MEAS - SI(CUBED): 30 ML/M^2
BH CV ECHO MEAS - SI(LVOT): 33.8 ML/M^2
BH CV ECHO MEAS - SI(MOD-SP2): 14.2 ML/M^2
BH CV ECHO MEAS - SI(MOD-SP4): 18.3 ML/M^2
BH CV ECHO MEAS - SI(TEICH): 29.1 ML/M^2
BH CV ECHO MEAS - SV(AO): 151.3 ML
BH CV ECHO MEAS - SV(CUBED): 44.3 ML
BH CV ECHO MEAS - SV(LVOT): 50 ML
BH CV ECHO MEAS - SV(MOD-SP2): 21 ML
BH CV ECHO MEAS - SV(MOD-SP4): 27 ML
BH CV ECHO MEAS - SV(TEICH): 43 ML
BH CV ECHO MEAS - TAPSE (>1.6): 1.7 CM2
BH CV ECHO MEAS - TR MAX VEL: 326 CM/SEC
BH CV ECHO MEASUREMENTS AVERAGE E/E' RATIO: 7.28
BH CV VAS BP RIGHT ARM: NORMAL MMHG
BH CV XLRA - RV BASE: 3.6 CM
BH CV XLRA - TDI S': 10.4 CM/SEC
BUN BLD-MCNC: 4 MG/DL (ref 8–23)
BUN/CREAT SERPL: 7.4 (ref 7–25)
CALCIUM SPEC-SCNC: 7.7 MG/DL (ref 8.6–10.5)
CHLORIDE SERPL-SCNC: 106 MMOL/L (ref 98–107)
CO2 SERPL-SCNC: 28.6 MMOL/L (ref 22–29)
CREAT BLD-MCNC: 0.54 MG/DL (ref 0.57–1)
DEPRECATED RDW RBC AUTO: 41.9 FL (ref 37–54)
DEPRECATED RDW RBC AUTO: 43.1 FL (ref 37–54)
EOSINOPHIL # BLD AUTO: 0.08 10*3/MM3 (ref 0–0.4)
EOSINOPHIL # BLD AUTO: 0.12 10*3/MM3 (ref 0–0.4)
EOSINOPHIL NFR BLD AUTO: 0.9 % (ref 0.3–6.2)
EOSINOPHIL NFR BLD AUTO: 1.5 % (ref 0.3–6.2)
ERYTHROCYTE [DISTWIDTH] IN BLOOD BY AUTOMATED COUNT: 12.6 % (ref 12.3–15.4)
ERYTHROCYTE [DISTWIDTH] IN BLOOD BY AUTOMATED COUNT: 12.7 % (ref 12.3–15.4)
GAS FLOW AIRWAY: 15 LPM
GFR SERPL CREATININE-BSD FRML MDRD: 109 ML/MIN/1.73
GLUCOSE BLD-MCNC: 105 MG/DL (ref 65–99)
GLUCOSE BLDC GLUCOMTR-MCNC: 128 MG/DL (ref 70–130)
HCO3 BLDA-SCNC: 24 MMOL/L (ref 22–28)
HCT VFR BLD AUTO: 30 % (ref 34–46.6)
HCT VFR BLD AUTO: 31 % (ref 34–46.6)
HGB BLD-MCNC: 9.2 G/DL (ref 12–15.9)
HGB BLD-MCNC: 9.7 G/DL (ref 12–15.9)
IMM GRANULOCYTES # BLD AUTO: 0.05 10*3/MM3 (ref 0–0.05)
IMM GRANULOCYTES # BLD AUTO: 0.08 10*3/MM3 (ref 0–0.05)
IMM GRANULOCYTES NFR BLD AUTO: 0.6 % (ref 0–0.5)
IMM GRANULOCYTES NFR BLD AUTO: 0.9 % (ref 0–0.5)
INR PPP: 1.29 (ref 0.9–1.1)
LEFT ATRIUM VOLUME INDEX: 13 ML/M2
LYMPHOCYTES # BLD AUTO: 0.5 10*3/MM3 (ref 0.7–3.1)
LYMPHOCYTES # BLD AUTO: 0.56 10*3/MM3 (ref 0.7–3.1)
LYMPHOCYTES NFR BLD AUTO: 5.5 % (ref 19.6–45.3)
LYMPHOCYTES NFR BLD AUTO: 6.9 % (ref 19.6–45.3)
MCH RBC QN AUTO: 28.5 PG (ref 26.6–33)
MCH RBC QN AUTO: 28.6 PG (ref 26.6–33)
MCHC RBC AUTO-ENTMCNC: 30.7 G/DL (ref 31.5–35.7)
MCHC RBC AUTO-ENTMCNC: 31.3 G/DL (ref 31.5–35.7)
MCV RBC AUTO: 91.4 FL (ref 79–97)
MCV RBC AUTO: 92.9 FL (ref 79–97)
MODALITY: ABNORMAL
MONOCYTES # BLD AUTO: 0.56 10*3/MM3 (ref 0.1–0.9)
MONOCYTES # BLD AUTO: 0.57 10*3/MM3 (ref 0.1–0.9)
MONOCYTES NFR BLD AUTO: 6.2 % (ref 5–12)
MONOCYTES NFR BLD AUTO: 7.1 % (ref 5–12)
NEUTROPHILS # BLD AUTO: 6.74 10*3/MM3 (ref 1.7–7)
NEUTROPHILS # BLD AUTO: 7.78 10*3/MM3 (ref 1.7–7)
NEUTROPHILS NFR BLD AUTO: 83.7 % (ref 42.7–76)
NEUTROPHILS NFR BLD AUTO: 86.2 % (ref 42.7–76)
NRBC BLD AUTO-RTO: 0 /100 WBC (ref 0–0.2)
NRBC BLD AUTO-RTO: 0 /100 WBC (ref 0–0.2)
NT-PROBNP SERPL-MCNC: 2007 PG/ML (ref 5–1800)
PCO2 BLDA: 36.3 MM HG (ref 35–45)
PH BLDA: 7.43 PH UNITS (ref 7.35–7.45)
PLATELET # BLD AUTO: 252 10*3/MM3 (ref 140–450)
PLATELET # BLD AUTO: 292 10*3/MM3 (ref 140–450)
PMV BLD AUTO: 10.1 FL (ref 6–12)
PMV BLD AUTO: 9.6 FL (ref 6–12)
PO2 BLDA: 63.7 MM HG (ref 80–100)
POTASSIUM BLD-SCNC: 3.7 MMOL/L (ref 3.5–5.2)
PROTHROMBIN TIME: 15.8 SECONDS (ref 11.7–14.2)
RBC # BLD AUTO: 3.23 10*6/MM3 (ref 3.77–5.28)
RBC # BLD AUTO: 3.39 10*6/MM3 (ref 3.77–5.28)
SAO2 % BLDCOA: 92.7 % (ref 92–99)
SET MECH RESP RATE: 24
SODIUM BLD-SCNC: 141 MMOL/L (ref 136–145)
TROPONIN T SERPL-MCNC: 0.03 NG/ML (ref 0–0.03)
TROPONIN T SERPL-MCNC: 0.04 NG/ML (ref 0–0.03)
WBC NRBC COR # BLD: 8.06 10*3/MM3 (ref 3.4–10.8)
WBC NRBC COR # BLD: 9.03 10*3/MM3 (ref 3.4–10.8)

## 2019-05-14 PROCEDURE — 0 IOPAMIDOL PER 1 ML: Performed by: INTERNAL MEDICINE

## 2019-05-14 PROCEDURE — 71275 CT ANGIOGRAPHY CHEST: CPT

## 2019-05-14 PROCEDURE — 85730 THROMBOPLASTIN TIME PARTIAL: CPT | Performed by: INTERNAL MEDICINE

## 2019-05-14 PROCEDURE — 25010000002 IRON SUCROSE PER 1 MG: Performed by: INTERNAL MEDICINE

## 2019-05-14 PROCEDURE — 99024 POSTOP FOLLOW-UP VISIT: CPT | Performed by: SURGERY

## 2019-05-14 PROCEDURE — 25010000002 HEPARIN (PORCINE) PER 1000 UNITS: Performed by: INTERNAL MEDICINE

## 2019-05-14 PROCEDURE — 84484 ASSAY OF TROPONIN QUANT: CPT | Performed by: INTERNAL MEDICINE

## 2019-05-14 PROCEDURE — 82962 GLUCOSE BLOOD TEST: CPT

## 2019-05-14 PROCEDURE — 94799 UNLISTED PULMONARY SVC/PX: CPT

## 2019-05-14 PROCEDURE — 99223 1ST HOSP IP/OBS HIGH 75: CPT | Performed by: INTERNAL MEDICINE

## 2019-05-14 PROCEDURE — 36600 WITHDRAWAL OF ARTERIAL BLOOD: CPT

## 2019-05-14 PROCEDURE — 80048 BASIC METABOLIC PNL TOTAL CA: CPT | Performed by: SURGERY

## 2019-05-14 PROCEDURE — 93010 ELECTROCARDIOGRAM REPORT: CPT | Performed by: INTERNAL MEDICINE

## 2019-05-14 PROCEDURE — 93306 TTE W/DOPPLER COMPLETE: CPT

## 2019-05-14 PROCEDURE — 93970 EXTREMITY STUDY: CPT

## 2019-05-14 PROCEDURE — 85025 COMPLETE CBC W/AUTO DIFF WBC: CPT | Performed by: INTERNAL MEDICINE

## 2019-05-14 PROCEDURE — 99222 1ST HOSP IP/OBS MODERATE 55: CPT | Performed by: INTERNAL MEDICINE

## 2019-05-14 PROCEDURE — 93005 ELECTROCARDIOGRAM TRACING: CPT | Performed by: INTERNAL MEDICINE

## 2019-05-14 PROCEDURE — 82803 BLOOD GASES ANY COMBINATION: CPT

## 2019-05-14 PROCEDURE — 83880 ASSAY OF NATRIURETIC PEPTIDE: CPT | Performed by: INTERNAL MEDICINE

## 2019-05-14 PROCEDURE — 85025 COMPLETE CBC W/AUTO DIFF WBC: CPT | Performed by: SURGERY

## 2019-05-14 PROCEDURE — 85610 PROTHROMBIN TIME: CPT | Performed by: INTERNAL MEDICINE

## 2019-05-14 PROCEDURE — 93306 TTE W/DOPPLER COMPLETE: CPT | Performed by: INTERNAL MEDICINE

## 2019-05-14 RX ORDER — HEPARIN SODIUM 10000 [USP'U]/100ML
18 INJECTION, SOLUTION INTRAVENOUS
Status: DISCONTINUED | OUTPATIENT
Start: 2019-05-14 | End: 2019-05-17

## 2019-05-14 RX ORDER — HEPARIN SODIUM 5000 [USP'U]/ML
40-80 INJECTION, SOLUTION INTRAVENOUS; SUBCUTANEOUS EVERY 6 HOURS PRN
Status: DISCONTINUED | OUTPATIENT
Start: 2019-05-14 | End: 2019-05-20 | Stop reason: HOSPADM

## 2019-05-14 RX ORDER — HEPARIN SODIUM 5000 [USP'U]/ML
80 INJECTION, SOLUTION INTRAVENOUS; SUBCUTANEOUS ONCE
Status: COMPLETED | OUTPATIENT
Start: 2019-05-14 | End: 2019-05-14

## 2019-05-14 RX ORDER — ACETAMINOPHEN 325 MG/1
650 TABLET ORAL ONCE
Status: COMPLETED | OUTPATIENT
Start: 2019-05-14 | End: 2019-05-14

## 2019-05-14 RX ADMIN — MULTIPLE VITAMINS W/ MINERALS TAB 1 TABLET: TAB at 08:02

## 2019-05-14 RX ADMIN — HEPARIN SODIUM 18 UNITS/KG/HR: 10000 INJECTION, SOLUTION INTRAVENOUS at 13:08

## 2019-05-14 RX ADMIN — IRON SUCROSE 300 MG: 20 INJECTION, SOLUTION INTRAVENOUS at 21:20

## 2019-05-14 RX ADMIN — GABAPENTIN 100 MG: 100 CAPSULE ORAL at 21:20

## 2019-05-14 RX ADMIN — Medication 1 CAPSULE: at 08:02

## 2019-05-14 RX ADMIN — GABAPENTIN 100 MG: 100 CAPSULE ORAL at 05:41

## 2019-05-14 RX ADMIN — SODIUM CHLORIDE, PRESERVATIVE FREE 3 ML: 5 INJECTION INTRAVENOUS at 20:43

## 2019-05-14 RX ADMIN — HEPARIN SODIUM 4000 UNITS: 5000 INJECTION INTRAVENOUS; SUBCUTANEOUS at 13:08

## 2019-05-14 RX ADMIN — SODIUM CHLORIDE, PRESERVATIVE FREE 3 ML: 5 INJECTION INTRAVENOUS at 08:03

## 2019-05-14 RX ADMIN — HYDROCODONE BITARTRATE AND ACETAMINOPHEN 1 TABLET: 7.5; 325 TABLET ORAL at 08:08

## 2019-05-14 RX ADMIN — ALVIMOPAN 12 MG: 12 CAPSULE ORAL at 20:43

## 2019-05-14 RX ADMIN — CELECOXIB 200 MG: 200 CAPSULE ORAL at 08:02

## 2019-05-14 RX ADMIN — ACETAMINOPHEN 650 MG: 325 TABLET, FILM COATED ORAL at 20:43

## 2019-05-14 RX ADMIN — DEXTROSE AND SODIUM CHLORIDE 100 ML/HR: 5; 450 INJECTION, SOLUTION INTRAVENOUS at 23:29

## 2019-05-14 RX ADMIN — DEXTROSE AND SODIUM CHLORIDE 100 ML/HR: 5; 450 INJECTION, SOLUTION INTRAVENOUS at 03:36

## 2019-05-14 RX ADMIN — ALVIMOPAN 12 MG: 12 CAPSULE ORAL at 08:02

## 2019-05-14 RX ADMIN — IOPAMIDOL 95 ML: 755 INJECTION, SOLUTION INTRAVENOUS at 11:14

## 2019-05-14 NOTE — PLAN OF CARE
Problem: Patient Care Overview  Goal: Plan of Care Review  Outcome: Ongoing (interventions implemented as appropriate)   05/14/19 1342   Coping/Psychosocial   Plan of Care Reviewed With patient   OTHER   Outcome Summary Pt transferred from  d/t rapid. CTA showed PE, heparin gtt started. Cardiology consulted. VSS on 9L HF O2. NSR on the monitor. No complaints of pain. Incision dressing dry and intact. Ambulating with assistance to BSC. No acute s/s of distress. Will continue to monitor.   Plan of Care Review   Progress improving

## 2019-05-14 NOTE — CODE DOCUMENTATION
To room 652. Rapid update to Adriana JOSEPH. Awaiting CT results. o2 sats 95%. BP 88/56. RN to call Dr AMIRAH Michaels with ABG and CT results (when available)

## 2019-05-14 NOTE — PROGRESS NOTES
Postoperative day #2 transverse colectomy    Events of today are noted.  I discussed her with Dr. Michaels. Per nursing patient is feeling pretty good.  No GI function yet.  No significant nausea or vomiting.  Per nursing incision looks good to this point.    I will see her again tomorrow.    Sandro Barajas MD  General and Endoscopic Surgery  [facility]    [address], [address]  [place], [place], [address]  P: [phone]  F: [phone]

## 2019-05-14 NOTE — CONSULTS
Patient Name: Rosa Salazar  :1940  78 y.o.    Date of Admission: 5/10/2019  Date of Consultation:  19  Encounter Provider: Bridger Nix MD  Place of Service: [facility]  Referring Provider: Sonny Dickens MD  Patient Care Team:  Chelsi Danielle PA as PCP - General (Physician Assistant)  Chelsi Danielle PA as PCP - Claims Attributed      Chief complaint: Colon cancer    Reason for Consult:  Pulmonary embolism    History of Present Illness:  78 year old patient with a history of hyperparathyroidism and recurrent bowel obstructions.  She was admitted to the hospital on 05/10/2019.  She was seen by GI service as outpatient for constipation.  CT of abdomen showed multiple liver lesions suggestive of metastatic disease and was scheduled for colonoscopy.  She had very poor appetite and nausea. Her primary site of cancer was found to be her colon.  She was taken on  by Dr Barajas for transverse colon resection to palliate her obstruction. Oncology has been consulted for her options post-operatively.      This morning she sat up in the chair and began to feel dizzy and slightly SOA. Her O2 sats dropped in to the 80's and required 15L NRB to obtain oxygen saturations into the 90's.  She thinks she may have passed out.  CT of chest was done that showed multiple bilateral pulmonary emboli within the pulmonary arteries along with right ventricular dilation.  She was transferred to monitored floor and will be started on heparin drip.     She states that her breathing has improved.  She does not feel lightheaded and denies chest pain.  She has a normal heart rate and has no evidence of shock.    Previous Cardiac Testing  ECHO 2018  Summary    The left ventricular chamber size, wall thickness, and systolic function are    within normal limits. There are no regional wall motion abnormalities    observed.    The ejection fraction biplane was  calculated at 66%.    The right ventricular chamber size and systolic function are within normal    limits.    No significant valvular abnormalities.        Any valve disease noted in the report is non-rheumatic unless otherwise    specifically noted.        Past Medical History:   Diagnosis Date   • Basal cell carcinoma 2013, 2014   • Bowel obstruction (CMS/HCC)    • History of nephrolithiasis    • History of small bowel obstruction    • Hypercalcemia    • Hyperparathyroidism (CMS/HCC)    • Kidney stones    • Kidney stones    • Osteopenia    • Thyroid disease        Past Surgical History:   Procedure Laterality Date   • APPENDECTOMY  1947   • BLADDER REPAIR  2009   • COLON SURGERY  2002    PERFORATED COLON (2 SPOTS OF COLON)   • COLON SURGERY  2004    OBSTRUCTION   • COLONOSCOPY  01/2002    colon perforation  [place]   • COLONOSCOPY N/A 5/11/2019    Procedure: COLONOSCOPY to transverse colon with biopsy, tattoo;  Surgeon: Marcos Bethea MD;  Location: [facility] ENDOSCOPY;  Service: Gastroenterology   • EXPLORATORY LAPAROTOMY N/A 5/12/2019    Procedure: Transverse colon resection;  Surgeon: Sandro Barajas MD;  Location: [facility] MAIN OR;  Service: General   • HERNIA REPAIR  2009    x2   • INTESTINAL MALROTATION REPAIR  2001    TWISTED INTESTINE    • KIDNEY STONE SURGERY  2004   • OOPHORECTOMY     • SKIN CANCER EXCISION  03/24/2014    Excision basal cell carcinoma, right forehead (0.8cm) with frozen section control and layered wound closure (2.2cm)-Dr. Stuart Bajwa   • SKIN CANCER EXCISION  11/19/2013    Excision basal cell carcinoma, biopsy site upper mid forehead (0.6cm) with frozen section control and layered wound closures (1.2cm)-Dr. Stuart Huang   • THYROIDECTOMY Right 9/8/2016    Procedure: RIGHT SUPERIOR PARATHYROID ADENOMA RESECTION, RIGHT INFERIOR PARATHYROID BX;  Surgeon: Xin Melissa MD;  Location: [facility] OR [facility];  Service:    • TOTAL ABDOMINAL HYSTERECTOMY  1978    X2 SURGERIES          Prior to Admission  medications    Medication Sig Start Date End Date Taking? Authorizing Provider   bisacodyl (DULCOLAX) 5 MG EC tablet Take 5 mg by mouth Daily As Needed for Constipation.   Yes Silvana Ennis MD   acetaminophen (TYLENOL) 500 MG tablet Take 500 mg by mouth every 6 (six) hours as needed for mild pain (1-3).    Silvana Ennis MD   calcitriol (ROCALTROL) 0.25 MCG capsule Begin one tablet daily of calcitriol, if you develop symptoms of numbness, tingling, or cramping despite use of calcium supplement tablets 9/8/16   Xin Melissa MD   Multiple Vitamins-Minerals (MULTIVITAMIN WITH MINERALS) tablet tablet Take 1 tablet by mouth Daily.    Silvana Ennis MD   Probiotic Product (PROBIOTIC PO) Take 1 capsule by mouth daily. Multidophilus 12, 20 billion, take 1 capsule daily    ProviderSilvana MD   Psyllium (METAMUCIL) 28.3 % powder Take  by mouth daily. 1 TSP DAILY       Silvana Ennis MD       Allergies   Allergen Reactions   • Advil [Ibuprofen] GI Intolerance   • Aspirin GI Intolerance   • Metronidazole GI Intolerance       Social History     Socioeconomic History   • Marital status:      Spouse name: Not on file   • Number of children: Not on file   • Years of education: Not on file   • Highest education level: Not on file   Tobacco Use   • Smoking status: Current Some Day Smoker   • Smokeless tobacco: Never Used   • Tobacco comment: 1 x monthly   Substance and Sexual Activity   • Alcohol use: No     Frequency: Never   • Drug use: No   • Sexual activity: Defer       Family History   Problem Relation Age of Onset   • Heart disease Mother    • Multiple myeloma Father    • Heart disease Father        REVIEW OF SYSTEMS:   All systems reviewed.  Pertinent positives identified in HPI.  All other systems are negative.      Objective:     Vitals:    05/14/19 1059 05/14/19 1101 05/14/19 1147 05/14/19 1630   BP:  104/65 93/63    BP Location:   Left arm    Patient Position:   Lying    Pulse:   "97 80 88   Resp:   22    Temp:   98.9 °F (37.2 °C)    TempSrc:   Axillary    SpO2: 94% 92% 98% 94%   Weight:       Height:         Body mass index is 20.04 kg/m².   Temp:  [97.7 °F (36.5 °C)-99.1 °F (37.3 °C)] 98.9 °F (37.2 °C)  Heart Rate:  [63-97] 88  Resp:  [18-28] 22  BP: ()/(55-69) 93/63    Intake/Output Summary (Last 24 hours) at 5/14/2019 1911  Last data filed at 5/14/2019 1000  Gross per 24 hour   Intake 1000 ml   Output 1250 ml   Net -250 ml     Flowsheet Rows      First Filed Value   Admission Height  157.5 cm (62\") Documented at 05/10/2019 1025   Admission Weight  52.6 kg (116 lb) Documented at 05/10/2019 1041          General Appearance:    Alert, cooperative, in no acute distress   Head:    Normocephalic, without obvious abnormality, atraumatic       Neck:   No adenopathy, supple, no thyromegaly, no carotid bruit, no    JVD   Lungs:     Clear to auscultation bilaterally, no wheezes, rales, or     rhonchi    Heart:    Normal rate, regular rhythm,  No murmur, rub, or gallop   Chest Wall:    No abnormalities observed   Abdomen:    Surgical incision clean dry and intact.  Dressing is dry.   Extremities:   No cyanosis, clubbing, or edema   Pulses:   Pulses palpable and equal bilaterally   Skin:   No bleeding or rash       Neurologic:   Cranial nerves 2 - 12 grossly intact, sensation intact                                                                     Lab Review:     Results from last 7 days   Lab Units 05/14/19  0445  05/10/19  1046   SODIUM mmol/L 141   < > 142   POTASSIUM mmol/L 3.7   < > 3.0*   CHLORIDE mmol/L 106   < > 98   CO2 mmol/L 28.6   < > 28.7   BUN mg/dL 4*   < > 13   CREATININE mg/dL 0.54*   < > 0.82   CALCIUM mg/dL 7.7*   < > 9.1   BILIRUBIN mg/dL  --   --  0.5   ALK PHOS U/L  --   --  135*   ALT (SGPT) U/L  --   --  16   AST (SGOT) U/L  --   --  26   GLUCOSE mg/dL 105*   < > 108*    < > = values in this interval not displayed.     Results from last 7 days   Lab Units 05/10/19  1046 "   TROPONIN T ng/mL <0.010     Results from last 7 days   Lab Units 05/14/19  1151   WBC 10*3/mm3 9.03   HEMOGLOBIN g/dL 9.2*   HEMATOCRIT % 30.0*   PLATELETS 10*3/mm3 252     Results from last 7 days   Lab Units 05/14/19  1151 05/10/19  1046   INR  1.29* 1.03   APTT seconds 28.3  --      Results from last 7 days   Lab Units 05/10/19  1046   MAGNESIUM mg/dL 2.1                 CT of chest  IMPRESSION:  1. Multiple bilateral pulmonary thromboemboli within segmental pulmonary  arteries and branches. The RV to LV ratio is 1.3.  2. New atelectatic change at both lower lobes and small bilateral  pleural effusions. There is also a new tiny pericardial effusion    EKG      EKG baseline     I personally viewed and interpreted the patient's EKG/Telemetry data.        Assessment and Plan:   1.  Bilateral pulmonary emboli with acute cor pulmonale  2.  Acute hypoxemic respiratory failure  3.  Metastatic colon cancer  4.  Anemia    I have reviewed the patient's CAT scan.  Unfortunately her pulmonary emboli are more distal lobar and segmental.  We cannot offer her catheter directed thrombectomy/embolectomy due to the distal location.    I would not recommend catheter directed TPA with a recent intra-abdominal surgery and increased bleeding risk    I would recommend lower extremity Dopplers to evaluate her residual thrombus burden    Continue heparin drip.    Unfortunately I think the best thing that we can offer her at this point in time is conservative approach with anticoagulant therapy.          Bridger Nix MD  05/14/19  7:11 PM

## 2019-05-14 NOTE — TELEPHONE ENCOUNTER
----- Message from Marcos Bethea MD sent at 5/14/2019 11:10 AM EDT -----  Patient is aware of diagnosis and had surgery Sunday, place her in colonoscopy recall 6 months

## 2019-05-14 NOTE — CONSULTS
Received Order for Advanced Directive. Attempted to meet with Pt.  However, at time of consult, Pt was with Medical Team for Treatment.  Will follow up with Pt Wednesday for Advanced Directive if she is available.    Thank you for the referral.

## 2019-05-14 NOTE — CODE DOCUMENTATION
RRT called d/t near syncopy. Pt got out of bed and went to chair she then became dizzy and feeling like she was going to pass out. Pt states had some SOA when getting up to chair. sats dropped to 82% Ra, Staff applied O2 and had to escalate quicky currently on 15L/NRB. Lungs clear bilaterally. Rt at bedside for ABG's

## 2019-05-14 NOTE — PLAN OF CARE
Problem: Patient Care Overview  Goal: Plan of Care Review  Outcome: Ongoing (interventions implemented as appropriate)   05/13/19 0627 05/13/19 2000 05/14/19 0434   Coping/Psychosocial   Plan of Care Reviewed With --  patient --    OTHER   Outcome Summary --  --  VSS. pt sleeping throughout night. ambulating only after encouragement. not many complaints of pain.    Plan of Care Review   Progress no change --  --      Goal: Individualization and Mutuality  Outcome: Ongoing (interventions implemented as appropriate)    Goal: Discharge Needs Assessment  Outcome: Ongoing (interventions implemented as appropriate)    Goal: Interprofessional Rounds/Family Conf  Outcome: Ongoing (interventions implemented as appropriate)      Problem: Constipation (Adult)  Goal: Effective Bowel Elimination  Outcome: Ongoing (interventions implemented as appropriate)    Goal: Comfort  Outcome: Ongoing (interventions implemented as appropriate)      Problem: Pain, Acute (Adult)  Goal: Acceptable Pain Control/Comfort Level  Outcome: Ongoing (interventions implemented as appropriate)      Problem: Nutrition, Imbalanced: Inadequate Oral Intake (Adult)  Goal: Improved Oral Intake  Outcome: Ongoing (interventions implemented as appropriate)    Goal: Prevent Further Weight Loss  Outcome: Ongoing (interventions implemented as appropriate)      Problem: Fall Risk (Adult)  Goal: Identify Related Risk Factors and Signs and Symptoms  Outcome: Ongoing (interventions implemented as appropriate)    Goal: Absence of Fall  Outcome: Ongoing (interventions implemented as appropriate)

## 2019-05-14 NOTE — CONSULTS
Subjective     REASON FOR CONSULTATION:  TRANSVERSE COLON CANCER STAGE IV LIVER METS, PULMONARY EMBOLUS, IRON DEFICIENCY ANEMIA DUE TO CHRONIC BLOOD LOSS, SOCIAL ISOLATION NO FAMILY MEMBERS AT ALL!  Provide an opinion on any further workup or treatment                             REQUESTING PHYSICIAN:  DR JAGRUTI CASTRO  RECORDS OBTAINED:  Records of the patients history including those obtained from the referring provider were reviewed and summarized in detail.        History of Present Illness I have been asked to see this edi 78-year-old white female who is extremely independent who has no family members on the planet whatsoever who lives by herself, takes care of herself, cooks for herself and does everything on her own. In any event the patient was admitted to the hospital 4 days ago after she has had a colonoscopy that documented a near complete obstructing lesion in the transverse colon biopsy proven to be colon cancer. The patient had had a partial colectomy. She was doing okay until this morning when she developed sudden onset of shortness of breath and chest pain, she dropped her blood pressure triggering immediate response team and CT angiogram documented very significant pulmonary emboli. The patient was placed on heparin and she has dramatically improved since then. She is having lesser degree of shortness of breath, no hemoptysis and her heart rate has calmed down. The patient has not had any pain or swelling in her lower extremities. The patient has not been able to eat anything yet because she has not had any resumption of bowel activity. She is passing minimal amount of gas per rectum. The patient's surgical site remains excellent. Urination is ongoing with no difficulties. The patient is having minimal pain. She has no other new issues.     The patient noticed several weeks ago that she was having significant degree of fatigue, not too much of an appetite. She lost probably 5-10 pounds of weight  and she was having abdominal pain, distention, discomfort and inability to defecate unless that she passed urine and loose stool happening at the same time. She was not noticing any passage of blood in the stool. Previous colonoscopy happened on this patient close to 17 years ago when she had colon perforation and she became scared from having any further colonoscopies at that time.       Past Medical History:   Diagnosis Date   • Basal cell carcinoma 2013, 2014   • Bowel obstruction (CMS/HCC)    • History of nephrolithiasis    • History of small bowel obstruction    • Hypercalcemia    • Hyperparathyroidism (CMS/HCC)    • Kidney stones    • Kidney stones    • Osteopenia    • Thyroid disease         Past Surgical History:   Procedure Laterality Date   • APPENDECTOMY  1947   • BLADDER REPAIR  2009   • COLON SURGERY  2002    PERFORATED COLON (2 SPOTS OF COLON)   • COLON SURGERY  2004    OBSTRUCTION   • COLONOSCOPY  01/2002    colon perforation  [place]   • COLONOSCOPY N/A 5/11/2019    Procedure: COLONOSCOPY to transverse colon with biopsy, tattoo;  Surgeon: Marcos Bethea MD;  Location: [facility] ENDOSCOPY;  Service: Gastroenterology   • EXPLORATORY LAPAROTOMY N/A 5/12/2019    Procedure: Transverse colon resection;  Surgeon: Sandro Barajas MD;  Location: [facility] OR;  Service: General   • HERNIA REPAIR  2009    x2   • INTESTINAL MALROTATION REPAIR  2001    TWISTED INTESTINE    • KIDNEY STONE SURGERY  2004   • OOPHORECTOMY     • SKIN CANCER EXCISION  03/24/2014    Excision basal cell carcinoma, right forehead (0.8cm) with frozen section control and layered wound closure (2.2cm)-Dr. Stuart Bajwa   • SKIN CANCER EXCISION  11/19/2013    Excision basal cell carcinoma, biopsy site upper mid forehead (0.6cm) with frozen section control and layered wound closures (1.2cm)-Dr. Stuart Huang   • THYROIDECTOMY Right 9/8/2016    Procedure: RIGHT SUPERIOR PARATHYROID ADENOMA RESECTION, RIGHT INFERIOR PARATHYROID BX;  Surgeon: Xin  MD Shin;  Location: [facility] OR [facility];  Service:    • TOTAL ABDOMINAL HYSTERECTOMY  1978    X2 SURGERIES         No current facility-administered medications on file prior to encounter.      Current Outpatient Medications on File Prior to Encounter   Medication Sig Dispense Refill   • bisacodyl (DULCOLAX) 5 MG EC tablet Take 5 mg by mouth Daily As Needed for Constipation.     • acetaminophen (TYLENOL) 500 MG tablet Take 500 mg by mouth every 6 (six) hours as needed for mild pain (1-3).     • calcitriol (ROCALTROL) 0.25 MCG capsule Begin one tablet daily of calcitriol, if you develop symptoms of numbness, tingling, or cramping despite use of calcium supplement tablets 30 capsule 0   • Multiple Vitamins-Minerals (MULTIVITAMIN WITH MINERALS) tablet tablet Take 1 tablet by mouth Daily.     • Probiotic Product (PROBIOTIC PO) Take 1 capsule by mouth daily. Multidophilus 12, 20 billion, take 1 capsule daily     • Psyllium (METAMUCIL) 28.3 % powder Take  by mouth daily. 1 TSP DAILY             ALLERGIES:    Allergies   Allergen Reactions   • Advil [Ibuprofen] GI Intolerance   • Aspirin GI Intolerance   • Metronidazole GI Intolerance        Social History     Socioeconomic History   • Marital status:      Spouse name: Not on file   • Number of children: Not on file   • Years of education: Not on file   • Highest education level: Not on file   Tobacco Use   • Smoking status: Current Some Day Smoker   • Smokeless tobacco: Never Used   • Tobacco comment: 1 x monthly   Substance and Sexual Activity   • Alcohol use: No     Frequency: Never   • Drug use: No   • Sexual activity: Defer        Family History   Problem Relation Age of Onset   • Heart disease Mother    • Multiple myeloma Father    • Heart disease Father         Review of Systems   Constitutional: Positive for fatigue and unexpected weight change.   HENT: Negative.    Eyes: Negative.    Respiratory: Positive for cough and shortness of breath.    Cardiovascular:  Positive for chest pain, palpitations and leg swelling.   Gastrointestinal: Positive for abdominal distention, abdominal pain and constipation.   Endocrine: Negative.    Genitourinary: Negative.    Musculoskeletal: Negative.    Skin: Negative.    Neurological: Negative.    Hematological: Negative.    Psychiatric/Behavioral: Negative.            Objective     Vitals:    05/14/19 1048 05/14/19 1059 05/14/19 1101 05/14/19 1147   BP: 106/66  104/65 93/63   BP Location:    Left arm   Patient Position:    Lying   Pulse: 88  97 80   Resp:    22   Temp:    98.9 °F (37.2 °C)   TempSrc:    Axillary   SpO2: 90% 94% 92% 98%   Weight:       Height:         No flowsheet data found.    Physical Exam   Constitutional: She is oriented to person, place, and time. She appears well-developed and well-nourished. No distress.   HENT:   Head: Normocephalic.   Nose: Nose normal.   Mouth/Throat: Oropharynx is clear and moist. No oropharyngeal exudate.   Eyes: Conjunctivae and EOM are normal. Pupils are equal, round, and reactive to light. Right eye exhibits no discharge. Left eye exhibits no discharge. No scleral icterus.   Neck: Normal range of motion. Neck supple. No JVD present. No tracheal deviation present. No thyromegaly present.   Cardiovascular: Normal rate, regular rhythm and intact distal pulses. Exam reveals no gallop and no friction rub.   Murmur heard.  Pulmonary/Chest: Effort normal and breath sounds normal. No stridor. No respiratory distress. She has no wheezes. She has no rales. She exhibits no tenderness.   Abdominal: Soft. She exhibits distension. She exhibits no mass. There is tenderness. There is no rebound and no guarding. No hernia.   Musculoskeletal: Normal range of motion.   Lymphadenopathy:     She has no cervical adenopathy.   Neurological: She is alert and oriented to person, place, and time.   Skin: Skin is dry. She is not diaphoretic.   Psychiatric: She has a normal mood and affect. Her behavior is normal.  Judgment and thought content normal.         RECENT LABS:  Hematology WBC   Date Value Ref Range Status   05/14/2019 9.03 3.40 - 10.80 10*3/mm3 Final     RBC   Date Value Ref Range Status   05/14/2019 3.23 (L) 3.77 - 5.28 10*6/mm3 Final     Hemoglobin   Date Value Ref Range Status   05/14/2019 9.2 (L) 12.0 - 15.9 g/dL Final     Hematocrit   Date Value Ref Range Status   05/14/2019 30.0 (L) 34.0 - 46.6 % Final     Platelets   Date Value Ref Range Status   05/14/2019 252 140 - 450 10*3/mm3 Final      A fungating completely obstructing large mass was found in the transverse colon. 60cm from the anus. The mass was  circumferential. The mass measured six cm in length. Oozing was present. This was biopsied with a cold forceps for  histology. Area was tattooed with an injection of 4 mL of Kinza ink.  Findings:  Tissue Pathology Exam: [id number]   Order: [id number]   Collected:  5/11/2019 10:39   Status:  Final result   Visible to patient:  No (Not Released)   Dx:  Colon cancer screening   Component    Final Diagnosis   1. Transverse Colon, Biopsy: INVASIVE MODERATELY DIFFERENTIATED COLONIC ADENOCARCINOMA.     swm/pkm    Electronically signed by Lenora Moran MD on 5/13/2019 at 1408   Comment            CT SCAN OF THE ABDOMEN AND PELVIS WITHOUT CONTRAST     HISTORY: Probable liver metastases noted on recent CT scan. Large stone  in right renal pelvis with obstruction. Abdominal pain and constipation.     The CT scan was performed as an emergency procedure through the abdomen  and pelvis without contrast and compared to the recent enhanced CT scans  of the abdomen and pelvis performed 4 days ago on 05/06/2019. The  following findings are present:  1. Again noted are multiple low-density lesions scattered throughout the  liver consistent with extensive metastatic disease and these measure up  to 2.6 cm in size. One of these could be biopsied under CT guidance for  diagnosis if necessary.  2. The lung bases are clear.  The spleen, pancreas, both adrenal glands  are unremarkable. There are multiple small gallstones within the  gallbladder.  3. There is a large ovoid stone in the right renal pelvis measuring up  to 13 mm and causing mild-to-moderate right renal obstruction that is  unchanged from 4 days ago. There are several smaller nonobstructing  stones scattered in the right kidney. The left kidney is unremarkable  except for a small cortical cyst.  4. There is no aortic aneurysm or retroperitoneal lymphadenopathy. The  large and small bowel loops are normal in caliber and show no  inflammatory change. No abnormality is seen in the pelvis.  5. At bone windows, no suspicious bone lesions are seen.           Radiation dose reduction techniques were utilized, including automated  exposure control and exposure modulation based on body size.     This report was finalized on 5/10/2019 1:55 PM by Dr. Shimon Canas M.D.  CT ANGIOGRAM OF THE CHEST. MULTIPLE CORONAL, SAGITTAL, AND 3-D  RECONSTRUCTIONS     HISTORY: 78-year-old female with hypoxia. Recently diagnosed with  metastatic colon cancer.     TECHNIQUE: Radiation dose reduction techniques were utilized, including  automated exposure control and exposure modulation based on body size.   CT angiogram of the chest was performed following the administration of  IV contrast. Multiple coronal, sagittal, and 3-D reconstruction images  were obtained. There is no previous chest CT for comparison. Correlated  with recent CTs of the abdomen.     FINDINGS: There are multiple bilateral pulmonary thromboemboli within  segmental pulmonary arteries. The main pulmonary arteries are patent,  but there are pulmonary thromboemboli within left upper lobe, left lower  lobe, right upper lobe, right middle lobe, and right lower lobe  pulmonary arteries and branches. The RV to LV ratio is 1.3. Since the CT  of the abdomen from 05/10/2019, there has been development of  atelectatic change at both lower  lobes, more prominent on the right and  small bilateral pleural effusions. There is a new tiny pericardial  effusion. There is no lymphadenopathy within the chest.     IMPRESSION:  1. Multiple bilateral pulmonary thromboemboli within segmental pulmonary  arteries and branches. The RV to LV ratio is 1.3.  2. New atelectatic change at both lower lobes and small bilateral  pleural effusions. There is also a new tiny pericardial effusion.     Discussed with Dr. Michaels.     This report was finalized on 5/14/2019 4:57 PM by Dr. Em Villagran M.D.  CEA   Order: [id number]   Status:  Final result   Visible to patient:  No (Not Released)    Ref Range & Units 4d ago   CEA ng/mL 40.80    Resulting Agency   [facility]      Narrative   Performed by: [facility]   CEA Reference Range:    Non Smokers:   Less than 3 ng/mL  Smokers:       Less than 5 ng/mL      Specimen Collected: 05/10/19 10:46 Last Resulted: 05/11/19 09:28             Assessment/Plan  In summary this patient is a 78-year-old white female who has nobody on the planet, no family members just a few friends who used to be a  many years ago. The patient used to take care of everything by herself buying groceries, cooking, cleaning, shopping, driving and so forth. In any event during the last several weeks she documented fatigue, anorexia, weight loss, abdominal pain, changes in bowel activity that triggered colonoscopy that documented unequivocally a transverse colon cancer. Biopsy proven. Since then the patient has undergone a colectomy. Unfortunately she developed massive significant pulmonary emboli this morning. Rapid response team was in the room, she was placed on anticoagulants immediately and she has had dramatic recovery.     The patient has not had any resumption of bowel activity yet.     The patient has had a CT scan of the abdomen that documents liver metastasis and her CEA level is 40.2. Final pathology of the colon is not available  yet.     The patient has also had iron deficiency anemia with a low ferritin, low iron profile not surprising chronic gastrointestinal blood loss due to malignancy. This will require IV iron therapy as early as starting today.    Obviously the patient is going to require several things.   1. We will do the anemia workup as necessary.   2. We will proceed with proper analysis of the pathological report including analysis of KRAS and MSI.   3. The patient will require IV Venofer initiating the 1st dose tonight 300 mg IV and continue this dose tomorrow for a total dose try to give her 900-1000 mg of IV iron.  4. The patient will require  assessment in regard social isolation. She will not go to a rehab place, she will not go to a nursing facility. She will need to have home health and she will need to have [facility] or entities of this nature to give her a hand once that she is ready for discharge.   5. She will require anticoagulation by mouth once that her oral route is resumed.  6. She will require followup with me in 2 weeks to discuss options of therapy for metastatic colon cancer. My main and only option for her will be to give her oral Xeloda 10 days on 7 days off continuously indefinitely until we see improvement radiologically, biochemically and clinically.   7. She will require monitoring of her anemia correction.  8. She will require monitoring of chronic anticoagulation and I will be glad to assume that care.    I had a lengthy discussion with the patient and reviewed her CT scans in the PAC system [facility] and we will wait for the pathological analysis of the surgical specimen.     The patient is willing to pursue all of these recommendations.

## 2019-05-15 DIAGNOSIS — C18.4 MALIGNANT NEOPLASM OF TRANSVERSE COLON (HCC): Primary | ICD-10-CM

## 2019-05-15 DIAGNOSIS — C78.7 LIVER METASTASES: ICD-10-CM

## 2019-05-15 PROBLEM — J96.01 ACUTE RESPIRATORY FAILURE WITH HYPOXIA (HCC): Status: ACTIVE | Noted: 2019-05-15

## 2019-05-15 PROBLEM — I26.09 OTHER PULMONARY EMBOLISM WITH ACUTE COR PULMONALE (HCC): Status: ACTIVE | Noted: 2019-05-14

## 2019-05-15 LAB
APTT PPP: 104.4 SECONDS (ref 22.7–35.4)
APTT PPP: 131.9 SECONDS (ref 22.7–35.4)
BASOPHILS # BLD AUTO: 0.04 10*3/MM3 (ref 0–0.2)
BASOPHILS NFR BLD AUTO: 0.3 % (ref 0–1.5)
BH CV LOWER VASCULAR LEFT COMMON FEMORAL AUGMENT: NORMAL
BH CV LOWER VASCULAR LEFT COMMON FEMORAL COMPETENT: NORMAL
BH CV LOWER VASCULAR LEFT COMMON FEMORAL COMPRESS: NORMAL
BH CV LOWER VASCULAR LEFT COMMON FEMORAL PHASIC: NORMAL
BH CV LOWER VASCULAR LEFT COMMON FEMORAL SPONT: NORMAL
BH CV LOWER VASCULAR LEFT DISTAL FEMORAL COMPRESS: NORMAL
BH CV LOWER VASCULAR LEFT GASTRONEMIUS COMPRESS: NORMAL
BH CV LOWER VASCULAR LEFT GREATER SAPH AK COMPRESS: NORMAL
BH CV LOWER VASCULAR LEFT GREATER SAPH BK COMPRESS: NORMAL
BH CV LOWER VASCULAR LEFT MID FEMORAL AUGMENT: NORMAL
BH CV LOWER VASCULAR LEFT MID FEMORAL COMPETENT: NORMAL
BH CV LOWER VASCULAR LEFT MID FEMORAL COMPRESS: NORMAL
BH CV LOWER VASCULAR LEFT MID FEMORAL PHASIC: NORMAL
BH CV LOWER VASCULAR LEFT MID FEMORAL SPONT: NORMAL
BH CV LOWER VASCULAR LEFT PERONEAL COMPRESS: NORMAL
BH CV LOWER VASCULAR LEFT POPLITEAL AUGMENT: NORMAL
BH CV LOWER VASCULAR LEFT POPLITEAL COMPETENT: NORMAL
BH CV LOWER VASCULAR LEFT POPLITEAL COMPRESS: NORMAL
BH CV LOWER VASCULAR LEFT POPLITEAL PHASIC: NORMAL
BH CV LOWER VASCULAR LEFT POPLITEAL SPONT: NORMAL
BH CV LOWER VASCULAR LEFT POSTERIOR TIBIAL COMPRESS: NORMAL
BH CV LOWER VASCULAR LEFT PROXIMAL FEMORAL COMPRESS: NORMAL
BH CV LOWER VASCULAR LEFT SAPHENOFEMORAL JUNCTION AUGMENT: NORMAL
BH CV LOWER VASCULAR LEFT SAPHENOFEMORAL JUNCTION COMPRESS: NORMAL
BH CV LOWER VASCULAR LEFT SAPHENOFEMORAL JUNCTION PHASIC: NORMAL
BH CV LOWER VASCULAR LEFT SAPHENOFEMORAL JUNCTION SPONT: NORMAL
BH CV LOWER VASCULAR RIGHT COMMON FEMORAL AUGMENT: NORMAL
BH CV LOWER VASCULAR RIGHT COMMON FEMORAL COMPETENT: NORMAL
BH CV LOWER VASCULAR RIGHT COMMON FEMORAL COMPRESS: NORMAL
BH CV LOWER VASCULAR RIGHT COMMON FEMORAL PHASIC: NORMAL
BH CV LOWER VASCULAR RIGHT COMMON FEMORAL SPONT: NORMAL
BH CV LOWER VASCULAR RIGHT DISTAL FEMORAL COMPRESS: NORMAL
BH CV LOWER VASCULAR RIGHT GASTRONEMIUS COMPRESS: NORMAL
BH CV LOWER VASCULAR RIGHT GREATER SAPH AK COMPRESS: NORMAL
BH CV LOWER VASCULAR RIGHT GREATER SAPH BK COMPRESS: NORMAL
BH CV LOWER VASCULAR RIGHT MID FEMORAL AUGMENT: NORMAL
BH CV LOWER VASCULAR RIGHT MID FEMORAL COMPETENT: NORMAL
BH CV LOWER VASCULAR RIGHT MID FEMORAL COMPRESS: NORMAL
BH CV LOWER VASCULAR RIGHT MID FEMORAL PHASIC: NORMAL
BH CV LOWER VASCULAR RIGHT MID FEMORAL SPONT: NORMAL
BH CV LOWER VASCULAR RIGHT PERONEAL COMPRESS: NORMAL
BH CV LOWER VASCULAR RIGHT POPLITEAL AUGMENT: NORMAL
BH CV LOWER VASCULAR RIGHT POPLITEAL COMPETENT: NORMAL
BH CV LOWER VASCULAR RIGHT POPLITEAL COMPRESS: NORMAL
BH CV LOWER VASCULAR RIGHT POPLITEAL PHASIC: NORMAL
BH CV LOWER VASCULAR RIGHT POPLITEAL SPONT: NORMAL
BH CV LOWER VASCULAR RIGHT POSTERIOR TIBIAL COMPRESS: NORMAL
BH CV LOWER VASCULAR RIGHT PROXIMAL FEMORAL COMPRESS: NORMAL
BH CV LOWER VASCULAR RIGHT SAPHENOFEMORAL JUNCTION AUGMENT: NORMAL
BH CV LOWER VASCULAR RIGHT SAPHENOFEMORAL JUNCTION COMPRESS: NORMAL
BH CV LOWER VASCULAR RIGHT SAPHENOFEMORAL JUNCTION PHASIC: NORMAL
BH CV LOWER VASCULAR RIGHT SAPHENOFEMORAL JUNCTION SPONT: NORMAL
DEPRECATED RDW RBC AUTO: 41.9 FL (ref 37–54)
EOSINOPHIL # BLD AUTO: 0.17 10*3/MM3 (ref 0–0.4)
EOSINOPHIL NFR BLD AUTO: 1.3 % (ref 0.3–6.2)
ERYTHROCYTE [DISTWIDTH] IN BLOOD BY AUTOMATED COUNT: 12.6 % (ref 12.3–15.4)
FERRITIN SERPL-MCNC: 298 NG/ML (ref 13–150)
HCT VFR BLD AUTO: 30.9 % (ref 34–46.6)
HGB BLD-MCNC: 9.8 G/DL (ref 12–15.9)
IMM GRANULOCYTES # BLD AUTO: 0.1 10*3/MM3 (ref 0–0.05)
IMM GRANULOCYTES NFR BLD AUTO: 0.7 % (ref 0–0.5)
IRON 24H UR-MRATE: 188 MCG/DL (ref 37–145)
IRON SATN MFR SERPL: 96 % (ref 20–50)
LYMPHOCYTES # BLD AUTO: 0.25 10*3/MM3 (ref 0.7–3.1)
LYMPHOCYTES NFR BLD AUTO: 1.9 % (ref 19.6–45.3)
MCH RBC QN AUTO: 28.7 PG (ref 26.6–33)
MCHC RBC AUTO-ENTMCNC: 31.7 G/DL (ref 31.5–35.7)
MCV RBC AUTO: 90.6 FL (ref 79–97)
MONOCYTES # BLD AUTO: 0.38 10*3/MM3 (ref 0.1–0.9)
MONOCYTES NFR BLD AUTO: 2.8 % (ref 5–12)
NEUTROPHILS # BLD AUTO: 12.43 10*3/MM3 (ref 1.7–7)
NEUTROPHILS NFR BLD AUTO: 93 % (ref 42.7–76)
NRBC BLD AUTO-RTO: 0 /100 WBC (ref 0–0.2)
PLATELET # BLD AUTO: 247 10*3/MM3 (ref 140–450)
PMV BLD AUTO: 10.2 FL (ref 6–12)
RBC # BLD AUTO: 3.41 10*6/MM3 (ref 3.77–5.28)
TIBC SERPL-MCNC: 195 MCG/DL (ref 298–536)
TRANSFERRIN SERPL-MCNC: 131 MG/DL (ref 200–360)
TROPONIN T SERPL-MCNC: 0.01 NG/ML (ref 0–0.03)
WBC NRBC COR # BLD: 13.37 10*3/MM3 (ref 3.4–10.8)

## 2019-05-15 PROCEDURE — 85730 THROMBOPLASTIN TIME PARTIAL: CPT | Performed by: INTERNAL MEDICINE

## 2019-05-15 PROCEDURE — 99232 SBSQ HOSP IP/OBS MODERATE 35: CPT | Performed by: PHYSICIAN ASSISTANT

## 2019-05-15 PROCEDURE — 99233 SBSQ HOSP IP/OBS HIGH 50: CPT | Performed by: INTERNAL MEDICINE

## 2019-05-15 PROCEDURE — 99024 POSTOP FOLLOW-UP VISIT: CPT | Performed by: SURGERY

## 2019-05-15 PROCEDURE — 83540 ASSAY OF IRON: CPT | Performed by: INTERNAL MEDICINE

## 2019-05-15 PROCEDURE — 84484 ASSAY OF TROPONIN QUANT: CPT | Performed by: INTERNAL MEDICINE

## 2019-05-15 PROCEDURE — 25010000002 HEPARIN (PORCINE) PER 1000 UNITS: Performed by: INTERNAL MEDICINE

## 2019-05-15 PROCEDURE — 94799 UNLISTED PULMONARY SVC/PX: CPT

## 2019-05-15 PROCEDURE — 25010000002 IRON SUCROSE PER 1 MG: Performed by: INTERNAL MEDICINE

## 2019-05-15 PROCEDURE — 82728 ASSAY OF FERRITIN: CPT | Performed by: INTERNAL MEDICINE

## 2019-05-15 PROCEDURE — 25010000002 ONDANSETRON PER 1 MG: Performed by: NURSE PRACTITIONER

## 2019-05-15 PROCEDURE — 84466 ASSAY OF TRANSFERRIN: CPT | Performed by: INTERNAL MEDICINE

## 2019-05-15 PROCEDURE — 85025 COMPLETE CBC W/AUTO DIFF WBC: CPT | Performed by: INTERNAL MEDICINE

## 2019-05-15 RX ORDER — PROMETHAZINE HYDROCHLORIDE 25 MG/1
12.5 TABLET ORAL EVERY 6 HOURS PRN
Status: DISCONTINUED | OUTPATIENT
Start: 2019-05-15 | End: 2019-05-20 | Stop reason: HOSPADM

## 2019-05-15 RX ORDER — ONDANSETRON 2 MG/ML
4 INJECTION INTRAMUSCULAR; INTRAVENOUS EVERY 4 HOURS PRN
Status: DISCONTINUED | OUTPATIENT
Start: 2019-05-15 | End: 2019-05-20 | Stop reason: HOSPADM

## 2019-05-15 RX ORDER — PROMETHAZINE HYDROCHLORIDE 25 MG/ML
12.5 INJECTION, SOLUTION INTRAMUSCULAR; INTRAVENOUS EVERY 6 HOURS PRN
Status: DISCONTINUED | OUTPATIENT
Start: 2019-05-15 | End: 2019-05-20 | Stop reason: HOSPADM

## 2019-05-15 RX ADMIN — HYDROCODONE BITARTRATE AND ACETAMINOPHEN 1 TABLET: 7.5; 325 TABLET ORAL at 13:52

## 2019-05-15 RX ADMIN — GABAPENTIN 100 MG: 100 CAPSULE ORAL at 13:52

## 2019-05-15 RX ADMIN — ALVIMOPAN 12 MG: 12 CAPSULE ORAL at 20:17

## 2019-05-15 RX ADMIN — IRON SUCROSE 300 MG: 20 INJECTION, SOLUTION INTRAVENOUS at 09:49

## 2019-05-15 RX ADMIN — ALVIMOPAN 12 MG: 12 CAPSULE ORAL at 09:49

## 2019-05-15 RX ADMIN — ONDANSETRON 4 MG: 2 INJECTION INTRAMUSCULAR; INTRAVENOUS at 03:02

## 2019-05-15 RX ADMIN — Medication 1 CAPSULE: at 09:49

## 2019-05-15 RX ADMIN — ONDANSETRON 4 MG: 2 INJECTION INTRAMUSCULAR; INTRAVENOUS at 13:52

## 2019-05-15 RX ADMIN — GABAPENTIN 100 MG: 100 CAPSULE ORAL at 07:30

## 2019-05-15 RX ADMIN — SODIUM CHLORIDE, PRESERVATIVE FREE 3 ML: 5 INJECTION INTRAVENOUS at 09:49

## 2019-05-15 RX ADMIN — MULTIPLE VITAMINS W/ MINERALS TAB 1 TABLET: TAB at 09:49

## 2019-05-15 RX ADMIN — HEPARIN SODIUM 15 UNITS/KG/HR: 10000 INJECTION, SOLUTION INTRAVENOUS at 14:53

## 2019-05-15 NOTE — PROGRESS NOTES
Message   Received: Yesterday   Message Contents   Shamar Wasserman MD  P Mg[facility] On[facility]  Pool   Cc: Lorri Ordonez, RN; Kalyani Nunez, JAKE; Shante Corona, JAKE; Nelly Donald; Virgie Bell              TISSUE FOR KRAS AND MSI, XELODA EVENTUALLY, CONSECUTION OF XARELTO OR ELIQUIS, TRANSPORTATION, HOME SERVICES, RILEY WITH ME 3 WEEKS 2 U WITH CBC CMP CEA FERRITIN IRON TIBC RETIC HB      Orders placed for labs

## 2019-05-15 NOTE — PROGRESS NOTES
"     LOS: 4 days   Primary Care Physician: Chelsi Danielle PA     Subjective   Events noted-near syncopal episode this morning and then shortness of breath with hypoxia.  No shortness of breath now.  No pleurisy.  No dizziness.    Vital Signs  Body mass index is 20.04 kg/m².  Temp:  [97.7 °F (36.5 °C)-99.1 °F (37.3 °C)] 97.8 °F (36.6 °C)  Heart Rate:  [63-97] 78  Resp:  [18-28] 20  BP: ()/(55-72) 117/72    Was on 15 L earlier, now 5 L    Objective:  General Appearance:  In no acute distress.    Vital signs: (most recent): Blood pressure 117/72, pulse 78, temperature 97.8 °F (36.6 °C), temperature source Oral, resp. rate 20, height 157.5 cm (62\"), weight 49.7 kg (109 lb 9.1 oz), SpO2 96 %.    HEENT: (No jvd. Trach midline. Neck supple)    Lungs:  She is not in respiratory distress.  No stridor.  There are decreased breath sounds.  No rales, wheezes or rhonchi.    Heart: Normal rate.  Regular rhythm.  No murmur.   Abdomen: Abdomen is soft and distended.  Tinkling bowel sounds.   There is generalized tenderness.   There is no splenomegaly. There is no hepatomegaly.   Extremities: There is dependent edema.  (Trace)  Neurological: Patient is alert.    Skin:  Warm and dry.  No rash.         Results Review:    I reviewed the patient's new clinical results.    Results from last 7 days   Lab Units 05/14/19  1151 05/14/19  0445   WBC 10*3/mm3 9.03 8.06   HEMOGLOBIN g/dL 9.2* 9.7*   PLATELETS 10*3/mm3 252 292     Results from last 7 days   Lab Units 05/14/19  0445 05/12/19  2305 05/12/19  0537   SODIUM mmol/L 141  --  145   POTASSIUM mmol/L 3.7 4.8 3.4*   CHLORIDE mmol/L 106  --  109*   CO2 mmol/L 28.6  --  27.0   BUN mg/dL 4*  --  5*   CREATININE mg/dL 0.54*  --  0.65   CALCIUM mg/dL 7.7*  --  8.4*   GLUCOSE mg/dL 105*  --  104*     Results from last 7 days   Lab Units 05/14/19  1151   INR  1.29*     Hemoglobin A1C:No results found for: HGBA1C    Glucose Range:  Glucose   Date/Time Value Ref Range Status "   05/14/2019 1032 128 70 - 130 mg/dL Final       Lab Results   Component Value Date    BYYHYVZK00 >2,000 (H) 05/12/2019       No results found for: TSH    Assessment & Plan      Medication Review: Yes    Active Hospital Problems    Diagnosis  POA   • **Malignant neoplasm of transverse colon (CMS/HCC) [C18.4]  Yes   • Thrombophilia (CMS/HCC) [D68.59]  No   • Other pulmonary embolism without acute cor pulmonale (CMS/HCC) [I26.99]  No   • Liver metastases (CMS/HCC) [C78.7]  Yes   • Abdominal pain [R10.9]  Yes   • Malnutrition (CMS/HCC) [E46]  Unknown   • Kidney stone [N20.0]  Yes      Resolved Hospital Problems    Diagnosis Date Resolved POA   • Hypokalemia [E87.6] 05/13/2019 Unknown       Assessment/Plan  1.  Bilateral PEs, I started IV heparin earlier.  Oxygenation is improved though still requiring O2.  RV strain noted.  Echo done.  2.  Colon cancer with mets to the liver.  Discussed earlier with Dr. Jo.  Patient possibly interested in chemo if side effects are very tolerable  3.  Right renal pelvis stones.  Urology had discussed stent placement and stone treatment in the future if desired  4.  Anemia of chronic disease.  Labs ordered  5.  Mild malnutrition    Estefany Michaels MD  05/14/19  9:20 PM

## 2019-05-15 NOTE — NURSING NOTE
Medicated for pain with Norco and mild nausea first with zofran with good effect, pt states she is not up to getting OOB right now, assisted to BR , voided large amount clear yellow urine, gait steady, enc sitting in chair and ambulating later today, pt to be transferred to [facility], report given to Jorge JOSEPH, pt to be transported in bed at her request, await transport.

## 2019-05-15 NOTE — PROGRESS NOTES
Subjective   TRANSVERSE COLON CANCER STAGE IV LIVER METS, PULMONARY EMBOLUS, IRON DEFICIENCY ANEMIA DUE TO CHRONIC BLOOD LOSS, SOCIAL ISOLATION NO FAMILY MEMBERS AT ALL!            History of Present Illness  I have been asked to see this edi 78-year-old white female who is extremely independent who has no family members on the planet whatsoever who lives by herself, takes care of herself, cooks for herself and does everything on her own. In any event the patient was admitted to the hospital 4 days ago after she has had a colonoscopy that documented a near complete obstructing lesion in the transverse colon biopsy proven to be colon cancer. The patient had had a partial colectomy. She was doing okay until this morning when she developed sudden onset of shortness of breath and chest pain, she dropped her blood pressure triggering immediate response team and CT angiogram documented very significant pulmonary emboli. The patient was placed on heparin and she has dramatically improved since then. She is having lesser degree of shortness of breath, no hemoptysis and her heart rate has calmed down. The patient has not had any pain or swelling in her lower extremities. The patient has not been able to eat anything yet because she has not had any resumption of bowel activity. She is passing minimal amount of gas per rectum. The patient's surgical site remains excellent. Urination is ongoing with no difficulties. The patient is having minimal pain. She has no other new issues.      The patient noticed several weeks ago that she was having significant degree of fatigue, not too much of an appetite. She lost probably 5-10 pounds of weight and she was having abdominal pain, distention, discomfort and inability to defecate unless that she passed urine and loose stool happening at the same time. She was not noticing any passage of blood in the stool. Previous colonoscopy happened on this patient close to 17 years ago when  she had colon perforation and she became scared from having any further colonoscopies at that time.           Past Medical History, Past Surgical History, Social History, Family History have been reviewed and are without significant changes except as mentioned.    Review of Systems   Constitutional: Positive for fatigue.   HENT: Negative.    Eyes: Negative.    Respiratory: Negative.    Cardiovascular: Negative.    Gastrointestinal: Positive for constipation.   Genitourinary: Negative.    Musculoskeletal: Negative.    Skin: Negative.    Neurological: Negative.    Hematological: Negative.    Psychiatric/Behavioral: Negative.           Medications:  The current medication list was reviewed in the EMR    ALLERGIES:    Allergies   Allergen Reactions   • Advil [Ibuprofen] GI Intolerance   • Aspirin GI Intolerance   • Metronidazole GI Intolerance       Objective      Vitals:    05/15/19 0008 05/15/19 0742 05/15/19 0826 05/15/19 0827   BP:  92/52 (!) 88/53 96/60   BP Location:  Right arm Left arm Left arm   Patient Position:  Lying     Pulse: 67 81     Resp:  18     Temp:  98.2 °F (36.8 °C)     TempSrc:  Oral     SpO2: 95% 91%     Weight:       Height:         No flowsheet data found.    Physical Exam   Constitutional: She is oriented to person, place, and time. She appears well-developed and well-nourished. No distress.   HENT:   Head: Normocephalic and atraumatic.   Right Ear: External ear normal.   Left Ear: External ear normal.   Nose: Nose normal.   Mouth/Throat: Oropharynx is clear and moist. No oropharyngeal exudate.   Eyes: Conjunctivae and EOM are normal. Pupils are equal, round, and reactive to light. Right eye exhibits no discharge. Left eye exhibits no discharge. No scleral icterus.   Neck: Normal range of motion. Neck supple. No JVD present. No tracheal deviation present. No thyromegaly present.   Cardiovascular: Normal rate, regular rhythm, normal heart sounds and intact distal pulses. Exam reveals no gallop  and no friction rub.   No murmur heard.  Pulmonary/Chest: Effort normal and breath sounds normal. No stridor. No respiratory distress. She has no wheezes. She has no rales. She exhibits no tenderness.   Abdominal: Soft. Bowel sounds are normal. She exhibits no distension and no mass. There is no tenderness. There is no rebound and no guarding. No hernia.   Poor bowel sounds no distention   Musculoskeletal: Normal range of motion. She exhibits no edema, tenderness or deformity.   Lymphadenopathy:     She has no cervical adenopathy.   Neurological: She is alert and oriented to person, place, and time. She has normal reflexes. She displays normal reflexes. No cranial nerve deficit. She exhibits normal muscle tone. Coordination normal.   Skin: Skin is warm and dry. No rash noted. She is not diaphoretic. No erythema. No pallor.   Psychiatric: She has a normal mood and affect. Her behavior is normal. Judgment and thought content normal.   Nursing note and vitals reviewed.        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  The CEA level is elevated and on top of this she has iron deficiency anemia associated with chronic gastrointestinal blood loss.  She also has had pulmonary embolus, deep vein thrombosis and has had near-syncope from the pulmonary embolus. All these issues are slowly improving.  Bowel activity has not resumed after surgery and I wonder if she is going to require having a suppository.      Other than that, she remains on heparin.  She has occasional PVCs on clinical examination and that is what shows up on the monitor.  The patient is not experiencing any respiratory symptomatology at this time.  She is very much into the mood of walking and moving around to see if this makes any difference regarding her ability to have bowel activity.      A  will be seeing her at some point in order to help her transfer to home with definitive care including home care and [facility] or institutions of this nature.    Other than that, I have nothing to add to her care at this time besides proceed with 2nd infusion of Venofer today and a 3rd one tomorrow.  Once she is eating by oral route in a normal way the patient will be initiated on oral anticoagulant, specifically Eliquis.      The patient also has had second thoughts about if she wants to be seen by palliative care and I pointed out that at this time I find no need for this institution to see her yet.                            5/15/2019      CC:

## 2019-05-15 NOTE — PROGRESS NOTES
Continued Stay Note  [facility]     Patient Name: Rosa Salazar  MRN: [id number]  Today's Date: 5/15/2019    Admit Date: 5/10/2019    Discharge Plan     Row Name 05/15/19 0958       Plan    Plan  Plan home.  DANIELLE Anderson RN    Patient/Family in Agreement with Plan  yes    Plan Comments  Spoke with pt at bedside.  Pt unsure of discharge needs at present.  Pt states her primary goal is home.  Pt is agreeable to HH if needed.   Plan home with HH if HH needed.   DANIELLE Anderson RN        Discharge Codes    No documentation.             Hannah Anderson, RN

## 2019-05-15 NOTE — NURSING NOTE
1530 aPTT collection clotted per laboratory. Clarified with laboratory to see if lab was redrawn. Lab noted that somebody will be up to the floor momentarily to draw recollection.

## 2019-05-15 NOTE — PLAN OF CARE
Problem: Patient Care Overview  Goal: Plan of Care Review  Outcome: Ongoing (interventions implemented as appropriate)   05/15/19 3108   Coping/Psychosocial   Plan of Care Reviewed With patient   OTHER   Outcome Summary VSS, am labs, wean O2 as tolerated, no BM yet, mild nausea, IVF and hep drip remain   Plan of Care Review   Progress improving

## 2019-05-15 NOTE — PROGRESS NOTES
"     LOS: 5 days   Primary Care Physician: Chelsi Danielle PA     Subjective   I saw the patient when she was still on 6 E.  She was feeling okay.  Belly was sore.  No flatus.  She met with palliative care nurse.  She has decided not to have any aggressive intervention or resuscitation.    Vital Signs  Body mass index is 20.04 kg/m².  Temp:  [97.8 °F (36.6 °C)-98.2 °F (36.8 °C)] 98 °F (36.7 °C)  Heart Rate:  [67-81] 70  Resp:  [16-20] 16  BP: ()/(52-72) 103/64    Still on 5 L    Objective:  General Appearance:  In no acute distress.    Vital signs: (most recent): Blood pressure 103/64, pulse 70, temperature 98 °F (36.7 °C), temperature source Oral, resp. rate 16, height 157.5 cm (62\"), weight 49.7 kg (109 lb 9.1 oz), SpO2 97 %.    HEENT: (No JVD.  Neck supple.  Trachea midline)    Lungs:  She is not in respiratory distress.  No stridor.  There are decreased breath sounds.  No rales, wheezes or rhonchi.    Heart: Normal rate.  Regular rhythm.  Positive for murmur.  (Grade 3/6 systolic murmur left sternal border)  Abdomen: Abdomen is soft and distended.  Bowel sounds are normal.   There is generalized tenderness.   There is no splenomegaly. There is no hepatomegaly.   Extremities: There is dependent edema.  (Trace)  Neurological: Patient is alert.    Skin:  Warm and dry.          Results Review:    I reviewed the patient's new clinical results.    Results from last 7 days   Lab Units 05/15/19  0619 05/14/19  1151   WBC 10*3/mm3 13.37* 9.03   HEMOGLOBIN g/dL 9.8* 9.2*   PLATELETS 10*3/mm3 247 252     Results from last 7 days   Lab Units 05/14/19  0445 05/12/19  2305 05/12/19  0537   SODIUM mmol/L 141  --  145   POTASSIUM mmol/L 3.7 4.8 3.4*   CHLORIDE mmol/L 106  --  109*   CO2 mmol/L 28.6  --  27.0   BUN mg/dL 4*  --  5*   CREATININE mg/dL 0.54*  --  0.65   CALCIUM mg/dL 7.7*  --  8.4*   GLUCOSE mg/dL 105*  --  104*     Results from last 7 days   Lab Units 05/14/19  1151   INR  1.29*     Hemoglobin A1C:No " results found for: HGBA1C    Glucose Range:  Glucose   Date/Time Value Ref Range Status   05/14/2019 1032 128 70 - 130 mg/dL Final       Lab Results   Component Value Date    WYQNNXNS11 >2,000 (H) 05/12/2019       No results found for: TSH    Assessment & Plan      Medication Review: Yes    Active Hospital Problems    Diagnosis  POA   • **Other pulmonary embolism with acute cor pulmonale (CMS/HCC) [I26.09]  No   • Acute respiratory failure with hypoxia (CMS/HCC) [J96.01]  Unknown   • Thrombophilia (CMS/HCC) [D68.59]  No   • Malignant neoplasm of transverse colon (CMS/HCC) [C18.4]  Yes   • Liver metastases (CMS/HCC) [C78.7]  Yes   • Abdominal pain [R10.9]  Yes   • Malnutrition (CMS/HCC) [E46]  Unknown   • Kidney stone [N20.0]  Yes      Resolved Hospital Problems    Diagnosis Date Resolved POA   • Hypokalemia [E87.6] 05/13/2019 Unknown       Assessment/Plan  1.  Bilateral pulmonary emboli with acute cor pulmonale.  Hypoxia is improving although still requiring 6 L of O2.  Continue IV heparin.  Change to Eliquis or similar when able to take p.o.  2.  Metastatic colon cancer, mets to the liver.  Patient does not want any aggressive intervention.  She does want to continue with anticoagulation as it will affect her quality of life.  She is being moved back over to the [facility].  Telemetry discontinued.  She is clear that if she has another episode of decompensation, she is not to be resuscitated  3.  Anemia of chronic disease with iron deficiency.  Hematology ordered IV iron x3 doses.  4.  Mild malnutrition  5.  Right renal pelvis stones.  Urology discussed possible stenting and stone treatment in the future if desired    Estefany Michaels MD  05/15/19  7:55 PM

## 2019-05-15 NOTE — PLAN OF CARE
Problem: Patient Care Overview  Goal: Plan of Care Review  Outcome: Ongoing (interventions implemented as appropriate)   05/15/19 1321   Coping/Psychosocial   Plan of Care Reviewed With patient   OTHER   Outcome Summary received pt in bed, A&O, HERNADEZ and follows commands, automatic BPs reading low in AM, manual BPs higher (see flow), pt denies pain, monitor tracing RSR, lungs diminished TO, O2 sats high 80s when recieved pt on 4l, O2 increased to 6l with O2 sats low 90s, denies SOA, Heparin gtt infusing and titrated per nomogram, incisions clean and dry, appetite poor, encouraged clears, IV fluids infusing per orders, skin intact, monitpred closely.   Plan of Care Review   Progress improving     Goal: Individualization and Mutuality  Outcome: Ongoing (interventions implemented as appropriate)    Goal: Discharge Needs Assessment  Outcome: Ongoing (interventions implemented as appropriate)    Goal: Interprofessional Rounds/Family Conf  Outcome: Ongoing (interventions implemented as appropriate)      Problem: Constipation (Adult)  Goal: Effective Bowel Elimination  Outcome: Ongoing (interventions implemented as appropriate)    Goal: Comfort  Outcome: Ongoing (interventions implemented as appropriate)      Problem: Pain, Acute (Adult)  Goal: Acceptable Pain Control/Comfort Level  Outcome: Ongoing (interventions implemented as appropriate)      Problem: Nutrition, Imbalanced: Inadequate Oral Intake (Adult)  Goal: Improved Oral Intake  Outcome: Ongoing (interventions implemented as appropriate)    Goal: Prevent Further Weight Loss  Outcome: Ongoing (interventions implemented as appropriate)      Problem: Fall Risk (Adult)  Goal: Identify Related Risk Factors and Signs and Symptoms  Outcome: Ongoing (interventions implemented as appropriate)    Goal: Absence of Fall  Outcome: Ongoing (interventions implemented as appropriate)      Problem: Skin Injury Risk (Adult)  Goal: Identify Related Risk Factors and Signs and  Symptoms  Outcome: Ongoing (interventions implemented as appropriate)    Goal: Skin Health and Integrity  Outcome: Ongoing (interventions implemented as appropriate)

## 2019-05-15 NOTE — CONSULTS
"Purpose of the visit was to evaluate for: goals of care/advanced care planning, support for patient/family, hospice referral/discussion, pain/symptom management, transfer to comfort care bed/unit and comfort care. Spoke with CCP as well as patient and discussed palliative care, goals of care, care options, resuscitation status, [facility] and discharge options.      Assessment:  Patient is palliative care appropriate for inpatient care given metastatic cancer. Alert and oriented. PPS 50%. States pain, nausea and constipation are main symptoms right now.     Recommendations/Plan: transfer to [facility] for continued discussion/assistance to patient/family regarding palliative care.     Other Comments: Met with patient to discuss goals of care, discharge plans and comfort care. She states she wants to go home as soon as she is stable enough. She wants to stay home to die there if at all possible. She doesn't want any of the aggressive care \"like what happened yesterday\". She said is was very scary. States she is going to take this \"one pill\" the Oncologist is offering  but nothing else like chemo or radiation. Her main symptom right now is constipation which she states is a long term problem with her. She normally takes \"enormous amounts of laxatives.\"   Discussed eventually having hospice help at home when she needs it but right now she feels she needs just home health and her neighbors are very helpful.    [facility]. Explained Palliative Care services on [facility] as a med/surg patient and how we could help her recover to go home as she has requested.   She also wants to complete a Living Will while she is here and the  is going to come back today to help her fill that out.   Call placed to Dr. Michaels with report of consultation. Will move to [facility] when ordered.   "

## 2019-05-15 NOTE — PROGRESS NOTES
Postop day 3 transverse colectomy    Subjective:  Doing reasonably well.  No GI function yet.  Feels full, little bit of nausea last night but no vomiting.    Objective:  Patient afebrile, vitals are stable  General: Awake alert and oriented, no distress  Abdomen: Soft, incision looks good, no drainage    Assessment and plan:  -Colonic adenocarcinoma  -Awaiting return of GI function    Sandro Barajas MD  General and Endoscopic Surgery  [facility], [facility]    [address], [address]  [place], [place], [address]  P: [phone]  F: [phone]

## 2019-05-15 NOTE — PROGRESS NOTES
LOS: 5 days   Patient Care Team:  Chelsi Danielle PA as PCP - General (Physician Assistant)  Chelsi Danielle PA as PCP - Claims Attributed  Meghan Melgoza APRN as Referring Physician (Pulmonary Disease)    Chief Complaint: Bilateral pulmonary emboli       Interval History: Complains of nausea and feeling constipated.      Objective   Vital Signs  Temp:  [97.8 °F (36.6 °C)-98.9 °F (37.2 °C)] 98.2 °F (36.8 °C)  Heart Rate:  [67-97] 81  Resp:  [18-28] 18  BP: ()/(52-72) 96/60    Intake/Output Summary (Last 24 hours) at 5/15/2019 0914  Last data filed at 5/15/2019 0854  Gross per 24 hour   Intake 1370 ml   Output 400 ml   Net 970 ml           Physical Exam   Constitutional: She is oriented to person, place, and time. She appears well-developed and well-nourished. No distress.   HENT:   Head: Normocephalic and atraumatic.   Neck: JVD present.   Cardiovascular: Normal rate, regular rhythm and normal heart sounds.   No murmur heard.  Pulmonary/Chest: Effort normal and breath sounds normal.   Abdominal: She exhibits no distension.   Musculoskeletal: She exhibits no edema.   Neurological: She is alert and oriented to person, place, and time.   Skin: Skin is warm and dry.   Psychiatric: She has a normal mood and affect. Her behavior is normal. Thought content normal.       Results Review:      Results from last 7 days   Lab Units 05/14/19  0445 05/12/19  2305 05/12/19  0537  05/10/19  1046   SODIUM mmol/L 141  --  145  --  142   POTASSIUM mmol/L 3.7 4.8 3.4*   < > 3.0*   CHLORIDE mmol/L 106  --  109*  --  98   CO2 mmol/L 28.6  --  27.0  --  28.7   BUN mg/dL 4*  --  5*  --  13   CREATININE mg/dL 0.54*  --  0.65  --  0.82   GLUCOSE mg/dL 105*  --  104*  --  108*   CALCIUM mg/dL 7.7*  --  8.4*  --  9.1    < > = values in this interval not displayed.     Results from last 7 days   Lab Units 05/15/19  0619 05/14/19  2238 05/14/19  1835   TROPONIN T ng/mL 0.015 0.031* 0.042*     Results from last 7 days   Lab  Units 05/15/19  0619 05/14/19  1151 05/14/19  0445   WBC 10*3/mm3 13.37* 9.03 8.06   HEMOGLOBIN g/dL 9.8* 9.2* 9.7*   HEMATOCRIT % 30.9* 30.0* 31.0*   PLATELETS 10*3/mm3 247 252 292     Results from last 7 days   Lab Units 05/15/19  0619 05/14/19  1835 05/14/19  1151 05/10/19  1046   INR   --   --  1.29* 1.03   APTT seconds 131.9* 92.1* 28.3  --          Results from last 7 days   Lab Units 05/10/19  1046   MAGNESIUM mg/dL 2.1           I reviewed the patient's new clinical results.  I personally viewed and interpreted the patient's EKG/Telemetry data        Medication Review:     alvimopan 12 mg Oral BID   gabapentin 100 mg Oral Q8H   iron sucrose 300 mg Intravenous Daily   ketamine 50 mg Intravenous Once   lactobacillus acidophilus 1 capsule Oral Daily   magnesium sulfate 2 g Intravenous Once   multivitamin with minerals 1 tablet Oral Daily   sodium chloride 3 mL Intravenous Q12H         dextrose 5 % and sodium chloride 0.45 % 100 mL/hr Last Rate: 100 mL/hr (05/14/19 2329)   heparin (porcine) 18 Units/kg/hr Last Rate: Stopped (05/15/19 0811)       Assessment/Plan      She is a patient with a history of metastatic cancer with her primary being in her colon.  She is status post transverse colon resection on Sunday for palliation of her obstruction.  In the setting, she became hypoxic and was found to have multiple bilateral pulmonary emboli along with right ventricular dilation.  She was started on a heparin drip.  Yesterday she was seen in consultation by Dr. Nix.  Because the pulmonary emboli were more distal lobar and segmental, catheter directed intervention was not recommended especially in the setting of a recent abdominal surgery and increased bleeding risk.  Conservative management was recommended.  She did have a normal bilateral lower extremity venous Dopplers without evidence of DVT.    Overall she is stable and doing well.  Her main complaints have to do with frustration with her hospital experience  as well as GI complaints of constipation and feeling nauseous.  From a cardiac standpoint she is stable.  She is on 6 L of oxygen via nasal cannula and her oxygen saturations are remaining above 92%.  Her heparin drip is currently on hold for an elevated PTT, this is going to be restarted at a lower dose.  I would favor keeping her on a heparin drip today and consider transitioning to oral anticoagulation tomorrow with Eliquis.      Malignant neoplasm of transverse colon (CMS/HCC)    Abdominal pain    Malnutrition (CMS/HCC)    Kidney stone    Liver metastases (CMS/HCC)    Thrombophilia (CMS/HCC)    Other pulmonary embolism without acute cor pulmonale (CMS/HCC)        TANI Fierro  05/15/19  9:14 AM

## 2019-05-15 NOTE — PROGRESS NOTES
LOS: 5 days   Patient Care Team:  Chelsi Danielle PA as PCP - General (Physician Assistant)  Chelsi Danielle PA as PCP - Claims Attributed  Meghan Melgoza APRN as Referring Physician (Pulmonary Disease)    Subjective     Well-developed elderly white female resting in bed she does not appear in any acute distress and she denies shortness of breath this is the only problem she has is her abdomen feels full she is not passed any gas or stool yet denies nausea.    Review of Systems:          Objective     Vital Signs  Vital Sign Min/Max for last 24 hours  Temp  Min: 97.8 °F (36.6 °C)  Max: 98.2 °F (36.8 °C)   BP  Min: 88/53  Max: 117/72   Pulse  Min: 67  Max: 88   Resp  Min: 18  Max: 20   SpO2  Min: 91 %  Max: 96 %   Flow (L/min)  Min: 4  Max: 9   No Data Recorded        Ventilator/Non-Invasive Ventilation Settings (From admission, onward)    None                       Body mass index is 20.04 kg/m².  I/O last 3 completed shifts:  In: 2310 [P.O.:120; I.V.:1940; IV Piggyback:250]  Out: 1250 [Urine:1250]  I/O this shift:  In: 60 [P.O.:60]  Out: -         Physical Exam:    General Appearance: Well-developed white female resting in bed she does not appear in any acute distress again she is on 6 L nasal cannula to with oxygen saturations of 98% and I decreased her to 4 L.  Eyes: Conjunctiva are clear and anicteric  ENT: Mucous membranes are moist no erythema or exudates  Neck: No adenopathy sitting at about 30 degrees and does have some jugular venous distention no palpable adenopathy  Lungs: Clear nonlabored symmetric expansion no wheezes rales or rhonchi  Cardiac: Regular rate rhythm no murmur heart rates in the 80s to 90  Abdomen: Midline incision with a clean dry dressing abdomen is a little distended but not really tender may be a little sore when you push but no palpable masses organomegaly  : Not examined  Musculoskeletal: She has absolutely no palpable cords no swelling no pain with  dorsiflexion of her legs her legs are very soft no suggestion of edema  Skin: No jaundice no petechiae  Neuro: He is alert oriented cooperative following commands  Extremities/P Vascular: No clubbing no cyanosis no edema palpable radial and dorsalis pedis pulses bilaterally  MSE:  Seems very depressed today           Labs:  Results from last 7 days   Lab Units 05/14/19  0445 05/12/19  2305 05/12/19  0537 05/11/19  1206 05/10/19  1046   GLUCOSE mg/dL 105*  --  104*  --  108*   SODIUM mmol/L 141  --  145  --  142   POTASSIUM mmol/L 3.7 4.8 3.4* 3.1* 3.0*   MAGNESIUM mg/dL  --   --   --   --  2.1   CO2 mmol/L 28.6  --  27.0  --  28.7   CHLORIDE mmol/L 106  --  109*  --  98   ANION GAP mmol/L 6.4  --  9.0  --  15.3   CREATININE mg/dL 0.54*  --  0.65  --  0.82   BUN mg/dL 4*  --  5*  --  13   BUN / CREAT RATIO  7.4  --  7.7  --  15.9   CALCIUM mg/dL 7.7*  --  8.4*  --  9.1   EGFR IF NONAFRICN AM mL/min/1.73 109  --  88  --  68   ALK PHOS U/L  --   --   --   --  135*   TOTAL PROTEIN g/dL  --   --   --   --  6.6   ALT (SGPT) U/L  --   --   --   --  16   AST (SGOT) U/L  --   --   --   --  26   BILIRUBIN mg/dL  --   --   --   --  0.5   ALBUMIN g/dL  --   --   --   --  3.80   GLOBULIN gm/dL  --   --   --   --  2.8     Estimated Creatinine Clearance: 46.2 mL/min (A) (by C-G formula based on SCr of 0.54 mg/dL (L)).      Results from last 7 days   Lab Units 05/15/19  0619 05/14/19  1151 05/14/19  0445 05/10/19  1046   WBC 10*3/mm3 13.37* 9.03 8.06 9.01   RBC 10*6/mm3 3.41* 3.23* 3.39* 4.10   HEMOGLOBIN g/dL 9.8* 9.2* 9.7* 11.9*   HEMATOCRIT % 30.9* 30.0* 31.0* 36.9   MCV fL 90.6 92.9 91.4 90.0   MCH pg 28.7 28.5 28.6 29.0   MCHC g/dL 31.7 30.7* 31.3* 32.2   RDW % 12.6 12.7 12.6 12.6   RDW-SD fl 41.9 43.1 41.9 41.2   MPV fL 10.2 9.6 10.1 10.0   PLATELETS 10*3/mm3 247 252 292 437   NEUTROPHIL % % 93.0* 86.2* 83.7* 73.2   LYMPHOCYTE % % 1.9* 5.5* 6.9* 12.3*   MONOCYTES % % 2.8* 6.2 7.1 11.4   EOSINOPHIL % % 1.3 0.9 1.5 2.2    BASOPHIL % % 0.3 0.3 0.2 0.3   IMM GRAN % % 0.7* 0.9* 0.6* 0.6*   NEUTROS ABS 10*3/mm3 12.43* 7.78* 6.74 6.59   LYMPHS ABS 10*3/mm3 0.25* 0.50* 0.56* 1.11   MONOS ABS 10*3/mm3 0.38 0.56 0.57 1.03*   EOS ABS 10*3/mm3 0.17 0.08 0.12 0.20   BASOS ABS 10*3/mm3 0.04 0.03 0.02 0.03   IMMATURE GRANS (ABS) 10*3/mm3 0.10* 0.08* 0.05 0.05   NRBC /100 WBC 0.0 0.0 0.0 0.0     Results from last 7 days   Lab Units 05/14/19  1101   PH, ARTERIAL pH units 7.428   PO2 ART mm Hg 63.7*   PCO2, ARTERIAL mm Hg 36.3   HCO3 ART mmol/L 24.0     Results from last 7 days   Lab Units 05/15/19  0619 05/14/19  2238 05/14/19  1835   TROPONIN T ng/mL 0.015 0.031* 0.042*     Results from last 7 days   Lab Units 05/14/19  1835   PROBNP pg/mL 2,007.0*             Results from last 7 days   Lab Units 05/14/19  1151 05/10/19  1046   INR  1.29* 1.03     Microbiology Results (last 10 days)     ** No results found for the last 240 hours. **                alvimopan 12 mg Oral BID   gabapentin 100 mg Oral Q8H   iron sucrose 300 mg Intravenous Daily   ketamine 50 mg Intravenous Once   lactobacillus acidophilus 1 capsule Oral Daily   magnesium sulfate 2 g Intravenous Once   multivitamin with minerals 1 tablet Oral Daily   sodium chloride 3 mL Intravenous Q12H       dextrose 5 % and sodium chloride 0.45 % 100 mL/hr Last Rate: 100 mL/hr (05/14/19 4789)   heparin (porcine) 18 Units/kg/hr Last Rate: 15 Units/kg/hr (05/15/19 1453)       Diagnostics:  Ct Abdomen Pelvis Without Contrast    Result Date: 5/10/2019  CT SCAN OF THE ABDOMEN AND PELVIS WITHOUT CONTRAST  HISTORY: Probable liver metastases noted on recent CT scan. Large stone in right renal pelvis with obstruction. Abdominal pain and constipation.  The CT scan was performed as an emergency procedure through the abdomen and pelvis without contrast and compared to the recent enhanced CT scans of the abdomen and pelvis performed 4 days ago on 05/06/2019. The following findings are present: 1. Again noted  are multiple low-density lesions scattered throughout the liver consistent with extensive metastatic disease and these measure up to 2.6 cm in size. One of these could be biopsied under CT guidance for diagnosis if necessary. 2. The lung bases are clear. The spleen, pancreas, both adrenal glands are unremarkable. There are multiple small gallstones within the gallbladder. 3. There is a large ovoid stone in the right renal pelvis measuring up to 13 mm and causing mild-to-moderate right renal obstruction that is unchanged from 4 days ago. There are several smaller nonobstructing stones scattered in the right kidney. The left kidney is unremarkable except for a small cortical cyst. 4. There is no aortic aneurysm or retroperitoneal lymphadenopathy. The large and small bowel loops are normal in caliber and show no inflammatory change. No abnormality is seen in the pelvis. 5. At bone windows, no suspicious bone lesions are seen.    Radiation dose reduction techniques were utilized, including automated exposure control and exposure modulation based on body size.  This report was finalized on 5/10/2019 1:55 PM by Dr. Shimon Canas M.D.      Xr Chest 2 View    Result Date: 5/10/2019  PA AND LATERAL CHEST  HISTORY: Abdominal distention and bloating for 5 to 6 weeks.  COMPARISON: None.  FINDINGS: The cardiomediastinal silhouette is within normal limits. Lungs appear clear and there is no evidence for pulmonary edema or pleural effusion or infiltrate. Surgical clips overlie the right lower neck/thyroid. There is a minor scoliotic curvature of the thoracic spine.      No evidence for active disease in the chest.  This report was finalized on 5/10/2019 12:25 PM by Dr. Raj Slater M.D.      Ct Angiogram Chest With & Without Contrast    Result Date: 5/14/2019  CT ANGIOGRAM OF THE CHEST. MULTIPLE CORONAL, SAGITTAL, AND 3-D RECONSTRUCTIONS  HISTORY: 78-year-old female with hypoxia. Recently diagnosed with metastatic colon  cancer.  TECHNIQUE: Radiation dose reduction techniques were utilized, including automated exposure control and exposure modulation based on body size. CT angiogram of the chest was performed following the administration of IV contrast. Multiple coronal, sagittal, and 3-D reconstruction images were obtained. There is no previous chest CT for comparison. Correlated with recent CTs of the abdomen.  FINDINGS: There are multiple bilateral pulmonary thromboemboli within segmental pulmonary arteries. The main pulmonary arteries are patent, but there are pulmonary thromboemboli within left upper lobe, left lower lobe, right upper lobe, right middle lobe, and right lower lobe pulmonary arteries and branches. The RV to LV ratio is 1.3. Since the CT of the abdomen from 05/10/2019, there has been development of atelectatic change at both lower lobes, more prominent on the right and small bilateral pleural effusions. There is a new tiny pericardial effusion. There is no lymphadenopathy within the chest.      1. Multiple bilateral pulmonary thromboemboli within segmental pulmonary arteries and branches. The RV to LV ratio is 1.3. 2. New atelectatic change at both lower lobes and small bilateral pleural effusions. There is also a new tiny pericardial effusion.  Discussed with Dr. Michaels.  This report was finalized on 5/14/2019 4:57 PM by Dr. Em Villagran M.D.      Ct Abdomen Pelvis With Contrast    Result Date: 5/6/2019  CT ABDOMEN PELVIS W CONTRAST-  CLINICAL HISTORY: Constipation. Abdominal bloating. History of bowel obstruction. Pelvic pain.  TECHNIQUE: Spiral CT images were acquired through the abdomen and pelvis with oral and IV contrast and were reconstructed in 3 mm thick slices.  Radiation dose reduction techniques were utilized, including automated exposure control and exposure modulation based on body size.  COMPARISON: CT scan of the abdomen and pelvis dated 09/27/2013  FINDINGS: There are multiple well-circumscribed  low-density masses of varying size scattered throughout both lobes of the liver consistent with hepatic metastatic disease that are new since the preceding CT scan. The largest mass is in the left lobe of the liver, and measures 2.3 cm in diameter. There are several tiny gallstones in the lumen of gallbladder. The gallbladder is otherwise unremarkable. There is a 12 x 8 mm diameter obstructing calculus at the right ureteropelvic junction that is producing moderate right hydronephrosis. A couple additional smaller nonobstructing calculi also present in the lower pole of the right kidney. The largest measures 5 mm. No calculi are noted within the left renal collecting system. Multiple small simple bilateral renal cysts are present. The kidneys are otherwise unremarkable. A grossly intact surgical anastomosis is noted within the transverse colon. The stomach and small bowel are unremarkable. The uterus is absent.      Multiple low-density solid masses of varying size scattered throughout both lobes of the liver consistent with hepatic metastatic disease that are new since the preceding CT dated 09/27/2013. Cholelithiasis. Large approximately 12 x 8 mm diameter obstructing calculus at the right ureteropelvic junction producing moderate right hydronephrosis. A couple nonobstructing calculi also present in the lower pole of the right kidney. Bilateral simple renal cysts. Postoperative changes as noted. Otherwise unremarkable CT scan of the abdomen and pelvis.  This report was finalized on 5/6/2019 3:55 PM by Dr. Alessio Carreno M.D.      Results for orders placed during the hospital encounter of 05/10/19   Adult Transthoracic Echo Complete W/ Cont if Necessary Per Protocol    Narrative · Calculated EF = 61.0%.  · Left ventricular systolic function is normal.  · Right atrial cavity size is severely dilated.  · Right ventricular cavity is severely dilated.  · Mild mitral valve regurgitation is present  · Severe tricuspid  valve regurgitation is present.  · Calculated right ventricular systolic pressure from tricuspid   regurgitation is 55 mmHg.  · Left ventricular diastolic dysfunction (grade I a) consistent with   impaired relaxation.  · There is a small (<1cm) pericardial effusion adjacent to the left   ventricle.  · Estimated right ventricular systolic pressure from tricuspid   regurgitation is markedly elevated (>55 mmHg).  · Right heart findings are consistent with cor pulmonale.              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 Bilateral segmental subsegmental pulmonary emboli seem to be improving given the improvement in her oxygenation and decreased cardiac strain as evidenced by following troponin.  Continue heparin drip and eventually transition to oral anticoagulants.  The source of the clots is unclear no lower extremity clots is possible she had a clot in her pelvic or abdominal veins that embolized or if the embolism was from lower extremities the entire clot embolized.  2. Colon carcinoma status post 5/12/2019 transverse colectomy for near obstructing tumor with probable liver metastases.  Oncology is following  3. Postop ileus awaiting return of gut function  4. Acute hypoxic respiratory failure secondary to #1 improving wean O2 as tolerated  5. Acute cor pulmonale secondary #1 seems to be improving as well line anemia mild looks like hemoglobin stable overnight    Plan for disposition:    Gareth Patel MD  05/15/19  2:56 PM    Time:

## 2019-05-16 LAB
ANION GAP SERPL CALCULATED.3IONS-SCNC: 9.5 MMOL/L
APTT PPP: 74.8 SECONDS (ref 22.7–35.4)
BASOPHILS # BLD AUTO: 0.02 10*3/MM3 (ref 0–0.2)
BASOPHILS NFR BLD AUTO: 0.3 % (ref 0–1.5)
BUN BLD-MCNC: 4 MG/DL (ref 8–23)
BUN/CREAT SERPL: 8.7 (ref 7–25)
CALCIUM SPEC-SCNC: 7.6 MG/DL (ref 8.6–10.5)
CHLORIDE SERPL-SCNC: 106 MMOL/L (ref 98–107)
CO2 SERPL-SCNC: 26.5 MMOL/L (ref 22–29)
CREAT BLD-MCNC: 0.46 MG/DL (ref 0.57–1)
DEPRECATED RDW RBC AUTO: 43.2 FL (ref 37–54)
EOSINOPHIL # BLD AUTO: 0.42 10*3/MM3 (ref 0–0.4)
EOSINOPHIL NFR BLD AUTO: 5.8 % (ref 0.3–6.2)
ERYTHROCYTE [DISTWIDTH] IN BLOOD BY AUTOMATED COUNT: 12.9 % (ref 12.3–15.4)
GFR SERPL CREATININE-BSD FRML MDRD: 132 ML/MIN/1.73
GLUCOSE BLD-MCNC: 116 MG/DL (ref 65–99)
HCT VFR BLD AUTO: 31.3 % (ref 34–46.6)
HGB BLD-MCNC: 9.8 G/DL (ref 12–15.9)
IMM GRANULOCYTES # BLD AUTO: 0.11 10*3/MM3 (ref 0–0.05)
IMM GRANULOCYTES NFR BLD AUTO: 1.5 % (ref 0–0.5)
LYMPHOCYTES # BLD AUTO: 0.61 10*3/MM3 (ref 0.7–3.1)
LYMPHOCYTES NFR BLD AUTO: 8.4 % (ref 19.6–45.3)
MCH RBC QN AUTO: 28.6 PG (ref 26.6–33)
MCHC RBC AUTO-ENTMCNC: 31.3 G/DL (ref 31.5–35.7)
MCV RBC AUTO: 91.3 FL (ref 79–97)
MONOCYTES # BLD AUTO: 0.42 10*3/MM3 (ref 0.1–0.9)
MONOCYTES NFR BLD AUTO: 5.8 % (ref 5–12)
NEUTROPHILS # BLD AUTO: 5.65 10*3/MM3 (ref 1.7–7)
NEUTROPHILS NFR BLD AUTO: 78.2 % (ref 42.7–76)
NRBC BLD AUTO-RTO: 0 /100 WBC (ref 0–0.2)
PLATELET # BLD AUTO: 256 10*3/MM3 (ref 140–450)
PMV BLD AUTO: 9.9 FL (ref 6–12)
POTASSIUM BLD-SCNC: 3 MMOL/L (ref 3.5–5.2)
RBC # BLD AUTO: 3.43 10*6/MM3 (ref 3.77–5.28)
SODIUM BLD-SCNC: 142 MMOL/L (ref 136–145)
WBC NRBC COR # BLD: 7.23 10*3/MM3 (ref 3.4–10.8)

## 2019-05-16 PROCEDURE — 80048 BASIC METABOLIC PNL TOTAL CA: CPT | Performed by: INTERNAL MEDICINE

## 2019-05-16 PROCEDURE — 25010000002 IRON SUCROSE PER 1 MG: Performed by: INTERNAL MEDICINE

## 2019-05-16 PROCEDURE — 85025 COMPLETE CBC W/AUTO DIFF WBC: CPT | Performed by: INTERNAL MEDICINE

## 2019-05-16 PROCEDURE — 85730 THROMBOPLASTIN TIME PARTIAL: CPT | Performed by: INTERNAL MEDICINE

## 2019-05-16 PROCEDURE — 99024 POSTOP FOLLOW-UP VISIT: CPT | Performed by: SURGERY

## 2019-05-16 PROCEDURE — 25010000002 PROMETHAZINE PER 50 MG: Performed by: INTERNAL MEDICINE

## 2019-05-16 PROCEDURE — 25010000002 ONDANSETRON PER 1 MG: Performed by: INTERNAL MEDICINE

## 2019-05-16 RX ADMIN — SODIUM CHLORIDE, PRESERVATIVE FREE 3 ML: 5 INJECTION INTRAVENOUS at 08:58

## 2019-05-16 RX ADMIN — ALVIMOPAN 12 MG: 12 CAPSULE ORAL at 08:58

## 2019-05-16 RX ADMIN — ONDANSETRON HYDROCHLORIDE 4 MG: 2 SOLUTION INTRAMUSCULAR; INTRAVENOUS at 09:32

## 2019-05-16 RX ADMIN — PROMETHAZINE HYDROCHLORIDE 12.5 MG: 25 INJECTION INTRAMUSCULAR; INTRAVENOUS at 11:29

## 2019-05-16 RX ADMIN — POTASSIUM CHLORIDE 40 MEQ: 750 CAPSULE, EXTENDED RELEASE ORAL at 08:57

## 2019-05-16 RX ADMIN — Medication 1 CAPSULE: at 08:58

## 2019-05-16 RX ADMIN — MULTIPLE VITAMINS W/ MINERALS TAB 1 TABLET: TAB at 08:58

## 2019-05-16 NOTE — PROGRESS NOTES
"DAILY PROGRESS NOTE  [facility]    Patient Identification:  Name: Rosa Salazar  Age: 78 y.o.  Sex: female  :  1940  MRN: [id number]         Primary Care Physician: Chelsi Danielle PA    Subjective:  Interval History:She is wanting comfort care. On 5 Liter O 2.    Objective:    Scheduled Meds:  alvimopan 12 mg Oral BID   gabapentin 100 mg Oral Q8H   iron sucrose 300 mg Intravenous Daily   ketamine 50 mg Intravenous Once   lactobacillus acidophilus 1 capsule Oral Daily   magnesium sulfate 2 g Intravenous Once   multivitamin with minerals 1 tablet Oral Daily   sodium chloride 3 mL Intravenous Q12H     Continuous Infusions:  dextrose 5 % and sodium chloride 0.45 % 100 mL/hr Last Rate: 100 mL/hr (19 303)   heparin (porcine) 18 Units/kg/hr Last Rate: 13 Units/kg/hr (05/15/19 2151)       Vital signs in last 24 hours:  Temp:  [97.4 °F (36.3 °C)-98 °F (36.7 °C)] 98 °F (36.7 °C)  Heart Rate:  [69-72] 69  Resp:  [16] 16  BP: (103-120)/(64-68) 120/68    Intake/Output:    Intake/Output Summary (Last 24 hours) at 2019 1354  Last data filed at 2019 0945  Gross per 24 hour   Intake 2042.96 ml   Output --   Net 2042.96 ml       Exam:  /68 (BP Location: Left arm, Patient Position: Lying)   Pulse 69   Temp 98 °F (36.7 °C) (Oral)   Resp 16   Ht 157.5 cm (62\")   Wt 49.7 kg (109 lb 9.1 oz)   SpO2 99%   BMI 20.04 kg/m²     General Appearance:    Alert, cooperative, no distress   Head:    Normocephalic, without obvious abnormality, atraumatic   Eyes:       Throat:   Lips, tongue, gums normal   Neck:   Supple, symmetrical, trachea midline, no JVD   Lungs:     Clear to auscultation bilaterally, respirations unlabored   Chest Wall:    No tenderness or deformity    Heart:    Regular rate and rhythm, S1 and S2 normal, no murmur,no  Rub or gallop   Abdomen:     Soft, non-tender, bowel sounds active, no masses, no organomegaly    Extremities:   Extremities normal, atraumatic, no " cyanosis or edema   Pulses:      Skin:   Skin is warm and dry,  no rashes or palpable lesions   Neurologic:   no focal deficits noted      Lab Results (last 72 hours)     Procedure Component Value Units Date/Time    Basic Metabolic Panel [[id number]]  (Abnormal) Collected:  05/16/19 0611    Specimen:  Blood Updated:  05/16/19 0712     Glucose 116 mg/dL      BUN 4 mg/dL      Creatinine 0.46 mg/dL      Sodium 142 mmol/L      Potassium 3.0 mmol/L      Chloride 106 mmol/L      CO2 26.5 mmol/L      Calcium 7.6 mg/dL      eGFR Non African Amer 132 mL/min/1.73      BUN/Creatinine Ratio 8.7     Anion Gap 9.5 mmol/L     Narrative:       GFR Normal >60  Chronic Kidney Disease <60  Kidney Failure <15    aPTT [[id number]]  (Abnormal) Collected:  05/16/19 0611    Specimen:  Blood Updated:  05/16/19 0657     PTT 74.8 seconds     CBC & Differential [[id number]] Collected:  05/16/19 0611    Specimen:  Blood Updated:  05/16/19 0647    Narrative:       The following orders were created for panel order CBC & Differential.  Procedure                               Abnormality         Status                     ---------                               -----------         ------                     CBC Auto Differential[[id number]]        Abnormal            Final result                 Please view results for these tests on the individual orders.    CBC Auto Differential [[id number]]  (Abnormal) Collected:  05/16/19 0611    Specimen:  Blood Updated:  05/16/19 0647     WBC 7.23 10*3/mm3      RBC 3.43 10*6/mm3      Hemoglobin 9.8 g/dL      Hematocrit 31.3 %      MCV 91.3 fL      MCH 28.6 pg      MCHC 31.3 g/dL      RDW 12.9 %      RDW-SD 43.2 fl      MPV 9.9 fL      Platelets 256 10*3/mm3      Neutrophil % 78.2 %      Lymphocyte % 8.4 %      Monocyte % 5.8 %      Eosinophil % 5.8 %      Basophil % 0.3 %      Immature Grans % 1.5 %      Neutrophils, Absolute 5.65 10*3/mm3      Lymphocytes, Absolute 0.61 10*3/mm3      Monocytes, Absolute 0.42  10*3/mm3      Eosinophils, Absolute 0.42 10*3/mm3      Basophils, Absolute 0.02 10*3/mm3      Immature Grans, Absolute 0.11 10*3/mm3      nRBC 0.0 /100 WBC     aPTT [[id number]]  (Abnormal) Collected:  05/15/19 1802    Specimen:  Blood Updated:  05/15/19 2129     .4 seconds     Tissue Pathology Exam [[id number]] Collected:  05/12/19 1016    Specimen:  Tissue from Large Intestine, Transverse Colon Updated:  05/15/19 1242     Addendum --     A request for KRAS, MSI was received on 5/15/19 for patient Rosa Salazar from Dr. Wasserman.  The test is to be performed on tissue from case [id number].  The case report, slides, and blocks for the cited accession were retrieved from archives.  The pathologist whose signature appears below reviewed the original pathology report, examined candidate H&E slides, and selected the block 1E and appropriate to the specifications of the ordered molecular analysis.   An addendum report will be issued when the results of this molecular test are available.    CPT Code: 75111         Case Report --     Surgical Pathology Report                         Case: [id number]                                  Authorizing Provider:  Sandro Barajas MD      Collected:           05/12/2019 10:16 AM          Ordering Location:     [facility]  Received:            05/13/2019 05:47 AM                                 MAIN OR                                                                      Pathologist:           Lenora Moran MD                                                    Specimen:    Large Intestine, Transverse Colon                                                           Final Diagnosis --     1. Transverse Colon, Segmental  Resection:  INVASIVE MODERATELY DIFFERENTIATED COLONIC       ADENOCARCINOMA.   A. Tumor site:  Transverse Colon.   B. Tumor size:  5.5 x 5.5 x 1.2 cm.   C. Macroscopic tumor perforation:  Not identified.   D. Tumor extension:  Tumor invades  through the muscularis into pericolonic adipose tissue.   E. Margins:  Uninvolved by invasive carcinoma.    1. Tumor comes to within 4.0 cm of the mesenteric margin and 4.0 cm of one of the parenchymal        resection margins (closest margins).   F. Lymphovascular invasion:  Present.   G. Perineural invasion:  Not identified.   H. Tumor deposits:  Not identified.   I.  Additional findings:  One resection margin with focus of mild ischemic change.   J. Lymph Nodes:  Eight of twenty-seven lymph nodes, positive for metastatic carcinoma (8/27).    1. Largest metastatic focus measures 1.0 cm, with extracapsular extension.   K. Pathologic stage:  pT3, N2b.    Swm/kds       Synoptic Checklist --     COLON AND RECTUM: Resection, Including Transanal Disk Excision of Rectal Neoplasms  (Colon Res - All Specimens)      SPECIMEN     Procedure:    Transverse colectomy     TUMOR     Tumor Site:    Transverse colon      Histologic Type:    Adenocarcinoma      Histologic Grade:    G2: Moderately differentiated      Tumor Size:    Greatest dimension in Centimeters (cm): 5.5 Centimeters (cm)     Tumor Deposits:    Not identified      Tumor Extent:           Tumor Extension:    Tumor invades through the muscularis propria into pericolorectal tissue        Macroscopic Tumor Perforation:    Not identified      Accessory Findings:           Lymphovascular Invasion:    Present          :    Small vessel lymphovascular invasion        Perineural Invasion:    Not identified        Tumor Budding:    Number of tumor buds in one ‘hotspot’ field (total number in area = 0.785 mm2)          :    Low score (0-4)        Type of Polyp in Which Invasive Carcinoma Arose:    None identified        Treatment Effect:    No known presurgical therapy     MARGINS     Margins:    All margins are uninvolved by invasive carcinoma, high-grade dysplasia, intramucosal adenocarcinoma, and adenoma        Margins Examined:    Proximal        Margins Examined:     "Distal        Margins Examined:    Radial or Mesenteric        Distance of Invasive Carcinoma from Closest Margin:    4.0 Centimeters (cm)       Closest Margin:    Radial or Mesenteric        Distance of Tumor from Distal Margin:    Cannot be determined: specimen unoriented     LYMPH NODES     Number of Lymph Nodes Involved:    8      Number of Lymph Nodes Examined:    27     PATHOLOGIC STAGE CLASSIFICATION (pTNM, AJCC 8th Edition)     Primary Tumor (pT):    pT3      Regional Lymph Nodes (pN):    pN2b        Gross Description --     1. Received in formalin labeled \"transverse colon\" is a 13 cm segment of transverse colon which is stapled at both ends and cannot be oriented.  There is tattoo ink at one end.  The remaining serosal surface is smooth tan-pink and there is minimal amount of adherent adipose tissue.  The serosal surface is focally umbilicated at the mid point of the segment.  The umbilicated serosa lies 3 cm from the tattoo ink.  The umbilicated serosa is inked blue and the colon is open to reveal a 90% circumferential 5.0 x 5.0 x 1.0 cm sessile centrally ulcerative mass which comes to within 4.2 cm of the tattooed resection margin and 4 cm of the opposite resection margin.  The mass extends to but does not grossly penetrate the overlying umbilicated serosal surface.  The mass also comes to within 4 cm of the mesenteric fat margin (inked black) and has a maximum depth of 1.2 cm, invading through the muscularis propria into the pericolic fat.  Following dissection of the pericolic fat reveals multiple probable lymph nodes ranging from 0.1 cm to 2.0 x 1.5 x 1.3 cm. The largest lymph node is sectioned to reveal a grossly positive lymph node conglomerate of at least two lymph nodes.  Representative sections are submitted as follows:      1A - tattooed staple line margin en face.  1B - opposite staple line margin en face.  1C-1D - mass invading underlying adipose tissue.  1E - mass towards mucosa in the " direction of the tattoo ink.  1F - mass towards mucosa in the direction of the opposite resection margin.  1G-1H - mass to umbilicated serosal surface.  1I  - mesenteric fat margin perpendicular.  1J - six lymph nodes en toto.  1K - six lymph nodes en toto.  1L - four lymph nodes en toto.  1M - two lymph nodes en toto.  1N - two grossly positive lymph nodes, one representative section of each.  1O - largest grossly positive lymph node conglomerate (at least two lymph nodes total) - one representative section.    Jap/uso/swm/kds       Microscopic Description --     Performed, incorporated in diagnosis.      Ferritin [[id number]]  (Abnormal) Collected:  05/15/19 0619    Specimen:  Blood Updated:  05/15/19 0951     Ferritin 298.00 ng/mL     Iron Profile [[id number]]  (Abnormal) Collected:  05/15/19 0619    Specimen:  Blood Updated:  05/15/19 0945     Iron 188 mcg/dL      Iron Saturation 96 %      Transferrin 131 mg/dL      TIBC 195 mcg/dL     Troponin [[id number]]  (Normal) Collected:  05/15/19 0619    Specimen:  Blood Updated:  05/15/19 0726     Troponin T 0.015 ng/mL     Narrative:       Troponin T Reference Range:  <= 0.03 ng/mL-   Negative for AMI  >0.03 ng/mL-     Abnormal for myocardial necrosis.  Clinicians would have to utilize clinical acumen, EKG, Troponin and serial changes to determine if it is an Acute Myocardial Infarction or myocardial injury due to an underlying chronic condition.     aPTT [[id number]]  (Abnormal) Collected:  05/15/19 0619    Specimen:  Blood Updated:  05/15/19 0720     .9 seconds     CBC & Differential [[id number]] Collected:  05/15/19 0619    Specimen:  Blood Updated:  05/15/19 0709    Narrative:       The following orders were created for panel order CBC & Differential.  Procedure                               Abnormality         Status                     ---------                               -----------         ------                     CBC Auto Differential[[id number]]         Abnormal            Final result                 Please view results for these tests on the individual orders.    CBC Auto Differential [[id number]]  (Abnormal) Collected:  05/15/19 0619    Specimen:  Blood Updated:  05/15/19 0709     WBC 13.37 10*3/mm3      RBC 3.41 10*6/mm3      Hemoglobin 9.8 g/dL      Hematocrit 30.9 %      MCV 90.6 fL      MCH 28.7 pg      MCHC 31.7 g/dL      RDW 12.6 %      RDW-SD 41.9 fl      MPV 10.2 fL      Platelets 247 10*3/mm3      Neutrophil % 93.0 %      Lymphocyte % 1.9 %      Monocyte % 2.8 %      Eosinophil % 1.3 %      Basophil % 0.3 %      Immature Grans % 0.7 %      Neutrophils, Absolute 12.43 10*3/mm3      Lymphocytes, Absolute 0.25 10*3/mm3      Monocytes, Absolute 0.38 10*3/mm3      Eosinophils, Absolute 0.17 10*3/mm3      Basophils, Absolute 0.04 10*3/mm3      Immature Grans, Absolute 0.10 10*3/mm3      nRBC 0.0 /100 WBC     Troponin [[id number]]  (Abnormal) Collected:  05/14/19 2238    Specimen:  Blood from Hand, Left Updated:  05/14/19 2342     Troponin T 0.031 ng/mL     Narrative:       Troponin T Reference Range:  <= 0.03 ng/mL-   Negative for AMI  >0.03 ng/mL-     Abnormal for myocardial necrosis.  Clinicians would have to utilize clinical acumen, EKG, Troponin and serial changes to determine if it is an Acute Myocardial Infarction or myocardial injury due to an underlying chronic condition.     aPTT [[id number]]  (Abnormal) Collected:  05/14/19 1835    Specimen:  Blood Updated:  05/14/19 1918     PTT 92.1 seconds     Troponin [[id number]]  (Abnormal) Collected:  05/14/19 1835    Specimen:  Blood Updated:  05/14/19 1916     Troponin T 0.042 ng/mL     Narrative:       Troponin T Reference Range:  <= 0.03 ng/mL-   Negative for AMI  >0.03 ng/mL-     Abnormal for myocardial necrosis.  Clinicians would have to utilize clinical acumen, EKG, Troponin and serial changes to determine if it is an Acute Myocardial Infarction or myocardial injury due to an underlying chronic condition.      BNP [[id number]]  (Abnormal) Collected:  05/14/19 1835    Specimen:  Blood Updated:  05/14/19 1910     proBNP 2,007.0 pg/mL     Narrative:       Among patients with dyspnea, NT-proBNP is highly sensitive for the detection of acute congestive heart failure. In addition NT-proBNP of <300 pg/ml effectively rules out acute congestive heart failure with 99% negative predictive value.    Protime-INR [[id number]]  (Abnormal) Collected:  05/14/19 1151    Specimen:  Blood Updated:  05/14/19 1240     Protime 15.8 Seconds      INR 1.29    aPTT [[id number]]  (Normal) Collected:  05/14/19 1151    Specimen:  Blood Updated:  05/14/19 1240     PTT 28.3 seconds     CBC & Differential [[id number]] Collected:  05/14/19 1151    Specimen:  Blood Updated:  05/14/19 1221    Narrative:       The following orders were created for panel order CBC & Differential.  Procedure                               Abnormality         Status                     ---------                               -----------         ------                     CBC Auto Differential[[id number]]        Abnormal            Final result                 Please view results for these tests on the individual orders.    CBC Auto Differential [[id number]]  (Abnormal) Collected:  05/14/19 1151    Specimen:  Blood Updated:  05/14/19 1221     WBC 9.03 10*3/mm3      RBC 3.23 10*6/mm3      Hemoglobin 9.2 g/dL      Hematocrit 30.0 %      MCV 92.9 fL      MCH 28.5 pg      MCHC 30.7 g/dL      RDW 12.7 %      RDW-SD 43.1 fl      MPV 9.6 fL      Platelets 252 10*3/mm3      Neutrophil % 86.2 %      Lymphocyte % 5.5 %      Monocyte % 6.2 %      Eosinophil % 0.9 %      Basophil % 0.3 %      Immature Grans % 0.9 %      Neutrophils, Absolute 7.78 10*3/mm3      Lymphocytes, Absolute 0.50 10*3/mm3      Monocytes, Absolute 0.56 10*3/mm3      Eosinophils, Absolute 0.08 10*3/mm3      Basophils, Absolute 0.03 10*3/mm3      Immature Grans, Absolute 0.08 10*3/mm3      nRBC 0.0 /100 WBC     Blood Gas,  Arterial [[id number]]  (Abnormal) Collected:  05/14/19 1101    Specimen:  Arterial Blood Updated:  05/14/19 1104     Site Arterial: left brachial     Francis's Test N/A     pH, Arterial 7.428 pH units      pCO2, Arterial 36.3 mm Hg      pO2, Arterial 63.7 mm Hg      HCO3, Arterial 24.0 mmol/L      Base Excess, Arterial -0.2 mmol/L      O2 Saturation Calculated 92.7 %      Barometric Pressure for Blood Gas 756.0 mmHg      Modality NRB     Flow Rate 15 lpm      Set [facility] Resp Rate 24    POC Glucose Once [[id number]]  (Normal) Collected:  05/14/19 1032    Specimen:  Blood Updated:  05/14/19 1039     Glucose 128 mg/dL     CBC & Differential [[id number]] Collected:  05/14/19 0445    Specimen:  Blood Updated:  05/14/19 0717    Narrative:       The following orders were created for panel order CBC & Differential.  Procedure                               Abnormality         Status                     ---------                               -----------         ------                     CBC Auto Differential[[id number]]        Abnormal            Final result                 Please view results for these tests on the individual orders.    CBC Auto Differential [[id number]]  (Abnormal) Collected:  05/14/19 0445    Specimen:  Blood Updated:  05/14/19 0717     WBC 8.06 10*3/mm3      RBC 3.39 10*6/mm3      Hemoglobin 9.7 g/dL      Hematocrit 31.0 %      MCV 91.4 fL      MCH 28.6 pg      MCHC 31.3 g/dL      RDW 12.6 %      RDW-SD 41.9 fl      MPV 10.1 fL      Platelets 292 10*3/mm3      Neutrophil % 83.7 %      Lymphocyte % 6.9 %      Monocyte % 7.1 %      Eosinophil % 1.5 %      Basophil % 0.2 %      Immature Grans % 0.6 %      Neutrophils, Absolute 6.74 10*3/mm3      Lymphocytes, Absolute 0.56 10*3/mm3      Monocytes, Absolute 0.57 10*3/mm3      Eosinophils, Absolute 0.12 10*3/mm3      Basophils, Absolute 0.02 10*3/mm3      Immature Grans, Absolute 0.05 10*3/mm3      nRBC 0.0 /100 WBC     Basic Metabolic Panel [[id number]]  (Abnormal)  Collected:  05/14/19 0445    Specimen:  Blood Updated:  05/14/19 0607     Glucose 105 mg/dL      BUN 4 mg/dL      Creatinine 0.54 mg/dL      Sodium 141 mmol/L      Potassium 3.7 mmol/L      Chloride 106 mmol/L      CO2 28.6 mmol/L      Calcium 7.7 mg/dL      eGFR Non African Amer 109 mL/min/1.73      BUN/Creatinine Ratio 7.4     Anion Gap 6.4 mmol/L     Narrative:       GFR Normal >60  Chronic Kidney Disease <60  Kidney Failure <15    Tissue Pathology Exam [[id number]] Collected:  05/11/19 1039    Specimen:  Tissue from Large Intestine, Transverse Colon Updated:  05/13/19 1408     Case Report --     Surgical Pathology Report                         Case: [id number]                                  Authorizing Provider:  Marcos Bethea MD        Collected:           05/11/2019 10:39 AM          Ordering Location:     [facility]  Received:            05/13/2019 06:06 AM                                 ENDO SUITES                                                                  Pathologist:           Lenora Moran MD                                                    Specimen:    Large Intestine, Transverse Colon, mass                                                     Final Diagnosis --     1. Transverse Colon, Biopsy: INVASIVE MODERATELY DIFFERENTIATED COLONIC ADENOCARCINOMA.    swm/ivette        Comment --     This case is shared internally with Dr. Nelson, who concurs.   m/oliviam        Gross Description --     1. The specimen is received in formalin labeled with the patient's name and further designated 'Large Intestine, Transverse Colon biopsy' are multiple small fragments of gray-tan tissue. The specimen is submitted for embedding as received.         Microscopic Description --     Performed, incorporated in diagnosis.          Data Review:  Results from last 7 days   Lab Units 05/16/19  0611 05/14/19  0445 05/12/19  2305 05/12/19  0537   SODIUM mmol/L 142 141  --  145   POTASSIUM mmol/L 3.0*  3.7 4.8 3.4*   CHLORIDE mmol/L 106 106  --  109*   CO2 mmol/L 26.5 28.6  --  27.0   BUN mg/dL 4* 4*  --  5*   CREATININE mg/dL 0.46* 0.54*  --  0.65   GLUCOSE mg/dL 116* 105*  --  104*   CALCIUM mg/dL 7.6* 7.7*  --  8.4*     Results from last 7 days   Lab Units 05/16/19  0611 05/15/19  0619 05/14/19  1151   WBC 10*3/mm3 7.23 13.37* 9.03   HEMOGLOBIN g/dL 9.8* 9.8* 9.2*   HEMATOCRIT % 31.3* 30.9* 30.0*   PLATELETS 10*3/mm3 256 247 252             Lab Results   Lab Value Date/Time    TROPONINT 0.015 05/15/2019 0619    TROPONINT 0.031 (C) 05/14/2019 2238    TROPONINT 0.042 (C) 05/14/2019 1835    TROPONINT <0.010 05/10/2019 1046         Results from last 7 days   Lab Units 05/10/19  1046   ALK PHOS U/L 135*   BILIRUBIN mg/dL 0.5   ALT (SGPT) U/L 16   AST (SGOT) U/L 26             Glucose   Date/Time Value Ref Range Status   05/14/2019 1032 128 70 - 130 mg/dL Final     Results from last 7 days   Lab Units 05/14/19  1151 05/10/19  1046   INR  1.29* 1.03       Past Medical History:   Diagnosis Date   • Basal cell carcinoma 2013, 2014   • Bowel obstruction (CMS/HCC)    • History of nephrolithiasis    • History of small bowel obstruction    • Hypercalcemia    • Hyperparathyroidism (CMS/HCC)    • Kidney stones    • Kidney stones    • Osteopenia    • Thyroid disease        Assessment:  Active Hospital Problems    Diagnosis  POA   • **Other pulmonary embolism with acute cor pulmonale (CMS/HCC) [I26.09]  No   • Acute respiratory failure with hypoxia (CMS/HCC) [J96.01]  Unknown   • Thrombophilia (CMS/HCC) [D68.59]  No   • 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 On heparin drip and is wanting comfort care.    Colt Aaron,  MD  5/16/2019  1:54 PM

## 2019-05-16 NOTE — PROGRESS NOTES
Discharge Planning Assessment  [facility]     Patient Name: Rosa Salazar  MRN: [id number]  Today's Date: 5/16/2019    Admit Date: 5/10/2019    Discharge Needs Assessment    No documentation.       Discharge Plan     Row Name 05/16/19 1201       Plan    Plan Comments  The patient was transferred to [facility] from [facility] on 5/15/19 @ 15:48. The patient is palliative. [facility] to evaluate. CCP will follow up with the discharge goal once [facility] meet with the patient and family. DANIELLE Candelaria Rn, CCP.         Destination      No service coordination in this encounter.      Durable Medical Equipment      No service coordination in this encounter.      Dialysis/Infusion      No service coordination in this encounter.      Home Medical Care      No service coordination in this encounter.      Therapy      No service coordination in this encounter.      Community Resources      No service coordination in this encounter.          Demographic Summary    No documentation.       Functional Status    No documentation.       Psychosocial    No documentation.       Abuse/Neglect    No documentation.       Legal    No documentation.       Substance Abuse    No documentation.       Patient Forms    No documentation.           Martha Candelaria RN

## 2019-05-16 NOTE — PROGRESS NOTES
Palliative Care Sw met with patient to assess psychosocial needs and provide support. Patient resting when Sw arrived. Patient expressed that she is feeling worse today than yesterday. Patient wanting to be comfortable and acknowledges that she is dying. Patient denied having any psychosocial/spiritual distress or worries at this time. Patient had difficulty engaging in discussion due to falling asleep. Sw provided psychosocial support, expressed empathy, and validated/normalized feelings. Sw advised patient of availability and will continue to follow as needed.

## 2019-05-16 NOTE — PLAN OF CARE
Problem: Patient Care Overview  Goal: Plan of Care Review  Outcome: Ongoing (interventions implemented as appropriate)   05/16/19 1441   Coping/Psychosocial   Plan of Care Reviewed With patient   OTHER   Outcome Summary Vitals as charted, no c/o pain, vomited x2 and zofran and phenergan given, pt states she is ready to die and does not want to take anymore oral meds or labs done, MD [place], and pallitive contacted, will continue to monitor.    Plan of Care Review   Progress no change

## 2019-05-16 NOTE — CONSULTS
Met with patient in her room. [facility]. Patient indicated that she did not want further treatment and would probably want [facility] services upon discharge. She stated that she has no family and, other than neighbors, has little to no support system. She is unsure what equipment or other needs she may have at time of discharge. There are no current discharge orders or plans noted. [facility] will continue to follow regarding discharge plans and needs.     Please call [phone] with any questions or concerns. Thank you for the referral.     Evi Nath RN, BSN  [facility] Referral & Admission Coordinator

## 2019-05-16 NOTE — PROGRESS NOTES
Discharge Planning Assessment  [facility]     Patient Name: Rosa Salazar  MRN: [id number]  Today's Date: 5/16/2019    Admit Date: 5/10/2019    Discharge Needs Assessment    No documentation.       Discharge Plan     Row Name 05/16/19 1539       Plan    Plan Comments  [facility] came by and did a EOS. [facility] will follow up when the patient is being discharged and will arrange for home with [facility]. The patient states she has not had a bowel movement and MD states she cannot be discharged until she does. CCP will continue to follow for any needs that may arise. DANIELLE Candelaria RN, CCP.         Destination      No service coordination in this encounter.      Durable Medical Equipment      No service coordination in this encounter.      Dialysis/Infusion      No service coordination in this encounter.      Home Medical Care      No service coordination in this encounter.      Therapy      No service coordination in this encounter.      Community Resources      No service coordination in this encounter.          Demographic Summary    No documentation.       Functional Status    No documentation.       Psychosocial    No documentation.       Abuse/Neglect    No documentation.       Legal    No documentation.       Substance Abuse    No documentation.       Patient Forms    No documentation.           Martha Candelaria RN

## 2019-05-16 NOTE — PROGRESS NOTES
Postop day #4 transverse colectomy for near-obstructing colon cancer    Subjective:  No change.  No bowel function.  Some nausea.    Objective:  Patient afebrile, vitals are stable  General: Anxious and tearful, no acute distress  Abdomen: Soft and appropriately tender, incision looks fine    Assessment and plan:  -Metastatic adenocarcinoma the colon, near obstructing, now status post transverse colectomy  -Still awaiting GI function.  Leave it clears for now.  -Patient stated to me she wanted no further blood work, wanted to minimize any medications, she sounds like she is getting close to givin up  -I asked her what she thought she might do if there was evidence of a surgical complication, and other words which should be ready to submit to another operation and at this time she says that she does not think she would consent to that.  Will follow.    Sandro Barajas MD  General and Endoscopic Surgery  [facility]    [address], [address]  [place], [place], [address]  P: [phone]  F: [phone]

## 2019-05-16 NOTE — PROGRESS NOTES
Discharge Planning Assessment  [facility]     Patient Name: Rosa Salazar  MRN: [id number]  Today's Date: 5/16/2019    Admit Date: 5/10/2019    Discharge Needs Assessment    No documentation.       Discharge Plan     Row Name 05/16/19 1215       Plan    Plan Comments  [facility]/Rachna Matute/JAKE scheduled to see the patient and family at 5/16/19 @ 18:30. DANIELLE Candelaria RN, CCP    Row Name 05/16/19 1201       Plan    Plan Comments  The patient was transferred to [facility] from [facility] on 5/15/19 @ 15:48. The patient is palliative. [facility] to evaluate. CCP will follow up with the discharge goal once [facility] meet with the patient and family. DANIELLE Candelaria Rn, CCP.         Destination      No service coordination in this encounter.      Durable Medical Equipment      No service coordination in this encounter.      Dialysis/Infusion      No service coordination in this encounter.      Home Medical Care      No service coordination in this encounter.      Therapy      No service coordination in this encounter.      Community Resources      No service coordination in this encounter.          Demographic Summary    No documentation.       Functional Status    No documentation.       Psychosocial    No documentation.       Abuse/Neglect    No documentation.       Legal    No documentation.       Substance Abuse    No documentation.       Patient Forms    No documentation.           Martha Candelaria RN

## 2019-05-16 NOTE — PLAN OF CARE
Problem: Patient Care Overview  Goal: Plan of Care Review  Outcome: Ongoing (interventions implemented as appropriate)   05/15/19 0975   Coping/Psychosocial   Plan of Care Reviewed With patient   OTHER   Outcome Summary Patient to [facility] for discharge planning and palliative care discussion. Patient currently denies pain, but does report some nausea - PRN nausea medications adjusted; however, patient wanted to hold off on any administrations for now. IV fluids and heparin gtt continued. Will continue to monitor.    Plan of Care Review   Progress no change     Goal: Individualization and Mutuality  Outcome: Ongoing (interventions implemented as appropriate)    Goal: Discharge Needs Assessment  Outcome: Ongoing (interventions implemented as appropriate)    Goal: Interprofessional Rounds/Family Conf  Outcome: Ongoing (interventions implemented as appropriate)      Problem: Constipation (Adult)  Goal: Effective Bowel Elimination  Outcome: Ongoing (interventions implemented as appropriate)    Goal: Comfort  Outcome: Ongoing (interventions implemented as appropriate)      Problem: Pain, Acute (Adult)  Goal: Acceptable Pain Control/Comfort Level  Outcome: Ongoing (interventions implemented as appropriate)      Problem: Nutrition, Imbalanced: Inadequate Oral Intake (Adult)  Goal: Improved Oral Intake  Outcome: Ongoing (interventions implemented as appropriate)    Goal: Prevent Further Weight Loss  Outcome: Ongoing (interventions implemented as appropriate)      Problem: Fall Risk (Adult)  Goal: Identify Related Risk Factors and Signs and Symptoms  Outcome: Outcome(s) achieved Date Met: 05/15/19    Goal: Absence of Fall  Outcome: Ongoing (interventions implemented as appropriate)      Problem: Skin Injury Risk (Adult)  Goal: Identify Related Risk Factors and Signs and Symptoms  Outcome: Outcome(s) achieved Date Met: 05/15/19    Goal: Skin Health and Integrity  Outcome: Ongoing (interventions implemented as  appropriate)

## 2019-05-16 NOTE — PLAN OF CARE
Problem: Patient Care Overview  Goal: Plan of Care Review  Outcome: Ongoing (interventions implemented as appropriate)   05/16/19 0566   Coping/Psychosocial   Plan of Care Reviewed With patient   OTHER   Outcome Summary Incision C/D/I. Rested most of the night. Denies nausea and pain. Trying to take fewer medications, refusing gabapentin. Ambulated around nursing unit. Not passing gas. Continue to monitor.    Plan of Care Review   Progress improving       Problem: Constipation (Adult)  Goal: Effective Bowel Elimination  Outcome: Ongoing (interventions implemented as appropriate)    Goal: Comfort  Outcome: Ongoing (interventions implemented as appropriate)      Problem: Pain, Acute (Adult)  Goal: Acceptable Pain Control/Comfort Level  Outcome: Ongoing (interventions implemented as appropriate)      Problem: Nutrition, Imbalanced: Inadequate Oral Intake (Adult)  Goal: Improved Oral Intake  Outcome: Ongoing (interventions implemented as appropriate)    Goal: Prevent Further Weight Loss  Outcome: Ongoing (interventions implemented as appropriate)      Problem: Fall Risk (Adult)  Goal: Absence of Fall  Outcome: Ongoing (interventions implemented as appropriate)      Problem: Skin Injury Risk (Adult)  Goal: Skin Health and Integrity  Outcome: Ongoing (interventions implemented as appropriate)

## 2019-05-17 PROCEDURE — 99232 SBSQ HOSP IP/OBS MODERATE 35: CPT | Performed by: NURSE PRACTITIONER

## 2019-05-17 PROCEDURE — 25010000002 HEPARIN (PORCINE) PER 1000 UNITS: Performed by: INTERNAL MEDICINE

## 2019-05-17 PROCEDURE — 99024 POSTOP FOLLOW-UP VISIT: CPT | Performed by: SURGERY

## 2019-05-17 RX ADMIN — SODIUM CHLORIDE, PRESERVATIVE FREE 3 ML: 5 INJECTION INTRAVENOUS at 09:09

## 2019-05-17 RX ADMIN — APIXABAN 5 MG: 5 TABLET, FILM COATED ORAL at 21:04

## 2019-05-17 RX ADMIN — GABAPENTIN 100 MG: 100 CAPSULE ORAL at 21:04

## 2019-05-17 RX ADMIN — SODIUM CHLORIDE, PRESERVATIVE FREE 3 ML: 5 INJECTION INTRAVENOUS at 21:07

## 2019-05-17 RX ADMIN — DEXTROSE AND SODIUM CHLORIDE 100 ML/HR: 5; 450 INJECTION, SOLUTION INTRAVENOUS at 06:03

## 2019-05-17 RX ADMIN — HEPARIN SODIUM 13 UNITS/KG/HR: 10000 INJECTION, SOLUTION INTRAVENOUS at 05:09

## 2019-05-17 NOTE — PROGRESS NOTES
Postop day #5 transverse colectomy for near obstructing colon cancer (metastatic)      Subjective:  Overall looks a little bit better today.  Passing flatus now.  Still with some nausea but this is improved.  Pain is well controlled.    Objective:  Patient afebrile, vitals are stable  General: Awake and alert, less depressed today, answering questions  Abdomen: Soft and benign, incision looks good    Assessment and plan:  -Metastatic colon cancer transverse colon, near obstructing, now status post transverse colectomy and beginning to regain some GI function  -Still does not feel like eating much and nauseated but will allow some full liquids if she can tolerate.  May be able to advance further from here.  -Overall she is really choosing palliative care at this time.  I am going to discontinue her Entereg, since she is not really taking it anyways.  I think she still has some decisions to make about what she wants to do next, but for the time being we will continue with traditional postop surgical management.    Sandro Barajas MD  General and Endoscopic Surgery  [facility]    [address], [address]  [place], [place], [address]  P: [phone]  F: [phone]

## 2019-05-17 NOTE — PROGRESS NOTES
"DAILY PROGRESS NOTE  [facility]    Patient Identification:  Name: Rosa Salazar  Age: 78 y.o.  Sex: female  :  1940  MRN: [id number]         Primary Care Physician: Chelsi Danielle PA    Subjective:  Interval History:She is wanting comfort care. On 2 Liter O 2.    Objective:    Scheduled Meds:    gabapentin 100 mg Oral Q8H   ketamine 50 mg Intravenous Once   lactobacillus acidophilus 1 capsule Oral Daily   magnesium sulfate 2 g Intravenous Once   multivitamin with minerals 1 tablet Oral Daily   sodium chloride 3 mL Intravenous Q12H     Continuous Infusions:     Vital signs in last 24 hours:  Temp:  [96.7 °F (35.9 °C)-97.5 °F (36.4 °C)] 97 °F (36.1 °C)  Heart Rate:  [63-69] 63  Resp:  [16-18] 16  BP: (108-126)/(64-75) 108/64    Intake/Output:    Intake/Output Summary (Last 24 hours) at 2019 1521  Last data filed at 2019 0512  Gross per 24 hour   Intake 40 ml   Output --   Net 40 ml       Exam:  /64 (BP Location: Right arm, Patient Position: Lying)   Pulse 63   Temp 97 °F (36.1 °C) (Oral)   Resp 16   Ht 157.5 cm (62\")   Wt 49.7 kg (109 lb 9.1 oz)   SpO2 95%   BMI 20.04 kg/m²     General Appearance:    Alert, cooperative, no distress   Head:    Normocephalic, without obvious abnormality, atraumatic   Eyes:       Throat:   Lips, tongue, gums normal   Neck:   Supple, symmetrical, trachea midline, no JVD   Lungs:     Clear to auscultation bilaterally, respirations unlabored   Chest Wall:    No tenderness or deformity    Heart:    Regular rate and rhythm, S1 and S2 normal, no murmur,no  Rub or gallop   Abdomen:     Soft, surgical changes, bowel sounds active, no masses, no organomegaly    Extremities:   Extremities normal, atraumatic, no cyanosis or edema   Pulses:      Skin:   Skin is warm and dry,  no rashes or palpable lesions   Neurologic:   no focal deficits noted      Lab Results (last 72 hours)     Procedure Component Value Units Date/Time    Basic Metabolic Panel " [[id number]]  (Abnormal) Collected:  05/16/19 0611    Specimen:  Blood Updated:  05/16/19 0712     Glucose 116 mg/dL      BUN 4 mg/dL      Creatinine 0.46 mg/dL      Sodium 142 mmol/L      Potassium 3.0 mmol/L      Chloride 106 mmol/L      CO2 26.5 mmol/L      Calcium 7.6 mg/dL      eGFR Non African Amer 132 mL/min/1.73      BUN/Creatinine Ratio 8.7     Anion Gap 9.5 mmol/L     Narrative:       GFR Normal >60  Chronic Kidney Disease <60  Kidney Failure <15    aPTT [[id number]]  (Abnormal) Collected:  05/16/19 0611    Specimen:  Blood Updated:  05/16/19 0657     PTT 74.8 seconds     CBC & Differential [[id number]] Collected:  05/16/19 0611    Specimen:  Blood Updated:  05/16/19 0647    Narrative:       The following orders were created for panel order CBC & Differential.  Procedure                               Abnormality         Status                     ---------                               -----------         ------                     CBC Auto Differential[[id number]]        Abnormal            Final result                 Please view results for these tests on the individual orders.    CBC Auto Differential [[id number]]  (Abnormal) Collected:  05/16/19 0611    Specimen:  Blood Updated:  05/16/19 0647     WBC 7.23 10*3/mm3      RBC 3.43 10*6/mm3      Hemoglobin 9.8 g/dL      Hematocrit 31.3 %      MCV 91.3 fL      MCH 28.6 pg      MCHC 31.3 g/dL      RDW 12.9 %      RDW-SD 43.2 fl      MPV 9.9 fL      Platelets 256 10*3/mm3      Neutrophil % 78.2 %      Lymphocyte % 8.4 %      Monocyte % 5.8 %      Eosinophil % 5.8 %      Basophil % 0.3 %      Immature Grans % 1.5 %      Neutrophils, Absolute 5.65 10*3/mm3      Lymphocytes, Absolute 0.61 10*3/mm3      Monocytes, Absolute 0.42 10*3/mm3      Eosinophils, Absolute 0.42 10*3/mm3      Basophils, Absolute 0.02 10*3/mm3      Immature Grans, Absolute 0.11 10*3/mm3      nRBC 0.0 /100 WBC     aPTT [[id number]]  (Abnormal) Collected:  05/15/19 1802    Specimen:  Blood  Updated:  05/15/19 2129     .4 seconds     Tissue Pathology Exam [[id number]] Collected:  05/12/19 1016    Specimen:  Tissue from Large Intestine, Transverse Colon Updated:  05/15/19 1242     Addendum --     A request for KRAS, MSI was received on 5/15/19 for patient Rosa Salazar from Dr. Wasserman.  The test is to be performed on tissue from case [id number].  The case report, slides, and blocks for the cited accession were retrieved from archives.  The pathologist whose signature appears below reviewed the original pathology report, examined candidate H&E slides, and selected the block 1E and appropriate to the specifications of the ordered molecular analysis.   An addendum report will be issued when the results of this molecular test are available.    CPT Code: 80636         Case Report --     Surgical Pathology Report                         Case: [id number]                                  Authorizing Provider:  Sandro Barajas MD      Collected:           05/12/2019 10:16 AM          Ordering Location:     [facility]  Received:            05/13/2019 05:47 AM                                 MAIN OR                                                                      Pathologist:           Lenora Moran MD                                                    Specimen:    Large Intestine, Transverse Colon                                                           Final Diagnosis --     1. Transverse Colon, Segmental  Resection:  INVASIVE MODERATELY DIFFERENTIATED COLONIC       ADENOCARCINOMA.   A. Tumor site:  Transverse Colon.   B. Tumor size:  5.5 x 5.5 x 1.2 cm.   C. Macroscopic tumor perforation:  Not identified.   D. Tumor extension:  Tumor invades through the muscularis into pericolonic adipose tissue.   E. Margins:  Uninvolved by invasive carcinoma.    1. Tumor comes to within 4.0 cm of the mesenteric margin and 4.0 cm of one of the parenchymal        resection margins (closest  margins).   F. Lymphovascular invasion:  Present.   G. Perineural invasion:  Not identified.   H. Tumor deposits:  Not identified.   I.  Additional findings:  One resection margin with focus of mild ischemic change.   J. Lymph Nodes:  Eight of twenty-seven lymph nodes, positive for metastatic carcinoma (8/27).    1. Largest metastatic focus measures 1.0 cm, with extracapsular extension.   K. Pathologic stage:  pT3, N2b.    Swm/kds       Synoptic Checklist --     COLON AND RECTUM: Resection, Including Transanal Disk Excision of Rectal Neoplasms  (Colon Res - All Specimens)      SPECIMEN     Procedure:    Transverse colectomy     TUMOR     Tumor Site:    Transverse colon      Histologic Type:    Adenocarcinoma      Histologic Grade:    G2: Moderately differentiated      Tumor Size:    Greatest dimension in Centimeters (cm): 5.5 Centimeters (cm)     Tumor Deposits:    Not identified      Tumor Extent:           Tumor Extension:    Tumor invades through the muscularis propria into pericolorectal tissue        Macroscopic Tumor Perforation:    Not identified      Accessory Findings:           Lymphovascular Invasion:    Present          :    Small vessel lymphovascular invasion        Perineural Invasion:    Not identified        Tumor Budding:    Number of tumor buds in one ‘hotspot’ field (total number in area = 0.785 mm2)          :    Low score (0-4)        Type of Polyp in Which Invasive Carcinoma Arose:    None identified        Treatment Effect:    No known presurgical therapy     MARGINS     Margins:    All margins are uninvolved by invasive carcinoma, high-grade dysplasia, intramucosal adenocarcinoma, and adenoma        Margins Examined:    Proximal        Margins Examined:    Distal        Margins Examined:    Radial or Mesenteric        Distance of Invasive Carcinoma from Closest Margin:    4.0 Centimeters (cm)       Closest Margin:    Radial or Mesenteric        Distance of Tumor from Distal Margin:    Cannot  "be determined: specimen unoriented     LYMPH NODES     Number of Lymph Nodes Involved:    8      Number of Lymph Nodes Examined:    27     PATHOLOGIC STAGE CLASSIFICATION (pTNM, AJCC 8th Edition)     Primary Tumor (pT):    pT3      Regional Lymph Nodes (pN):    pN2b        Gross Description --     1. Received in formalin labeled \"transverse colon\" is a 13 cm segment of transverse colon which is stapled at both ends and cannot be oriented.  There is tattoo ink at one end.  The remaining serosal surface is smooth tan-pink and there is minimal amount of adherent adipose tissue.  The serosal surface is focally umbilicated at the mid point of the segment.  The umbilicated serosa lies 3 cm from the tattoo ink.  The umbilicated serosa is inked blue and the colon is open to reveal a 90% circumferential 5.0 x 5.0 x 1.0 cm sessile centrally ulcerative mass which comes to within 4.2 cm of the tattooed resection margin and 4 cm of the opposite resection margin.  The mass extends to but does not grossly penetrate the overlying umbilicated serosal surface.  The mass also comes to within 4 cm of the mesenteric fat margin (inked black) and has a maximum depth of 1.2 cm, invading through the muscularis propria into the pericolic fat.  Following dissection of the pericolic fat reveals multiple probable lymph nodes ranging from 0.1 cm to 2.0 x 1.5 x 1.3 cm. The largest lymph node is sectioned to reveal a grossly positive lymph node conglomerate of at least two lymph nodes.  Representative sections are submitted as follows:      1A - tattooed staple line margin en face.  1B - opposite staple line margin en face.  1C-1D - mass invading underlying adipose tissue.  1E - mass towards mucosa in the direction of the tattoo ink.  1F - mass towards mucosa in the direction of the opposite resection margin.  1G-1H - mass to umbilicated serosal surface.  1I  - mesenteric fat margin perpendicular.  1J - six lymph nodes en toto.  1K - six lymph " nodes en toto.  1L - four lymph nodes en toto.  1M - two lymph nodes en toto.  1N - two grossly positive lymph nodes, one representative section of each.  1O - largest grossly positive lymph node conglomerate (at least two lymph nodes total) - one representative section.    Jap/uso/swm/kds       Microscopic Description --     Performed, incorporated in diagnosis.      Ferritin [[id number]]  (Abnormal) Collected:  05/15/19 0619    Specimen:  Blood Updated:  05/15/19 0951     Ferritin 298.00 ng/mL     Iron Profile [[id number]]  (Abnormal) Collected:  05/15/19 0619    Specimen:  Blood Updated:  05/15/19 0945     Iron 188 mcg/dL      Iron Saturation 96 %      Transferrin 131 mg/dL      TIBC 195 mcg/dL     Troponin [[id number]]  (Normal) Collected:  05/15/19 0619    Specimen:  Blood Updated:  05/15/19 0726     Troponin T 0.015 ng/mL     Narrative:       Troponin T Reference Range:  <= 0.03 ng/mL-   Negative for AMI  >0.03 ng/mL-     Abnormal for myocardial necrosis.  Clinicians would have to utilize clinical acumen, EKG, Troponin and serial changes to determine if it is an Acute Myocardial Infarction or myocardial injury due to an underlying chronic condition.     aPTT [[id number]]  (Abnormal) Collected:  05/15/19 0619    Specimen:  Blood Updated:  05/15/19 0720     .9 seconds     CBC & Differential [[id number]] Collected:  05/15/19 0619    Specimen:  Blood Updated:  05/15/19 0709    Narrative:       The following orders were created for panel order CBC & Differential.  Procedure                               Abnormality         Status                     ---------                               -----------         ------                     CBC Auto Differential[[id number]]        Abnormal            Final result                 Please view results for these tests on the individual orders.    CBC Auto Differential [[id number]]  (Abnormal) Collected:  05/15/19 0619    Specimen:  Blood Updated:  05/15/19 0709     WBC 13.37  10*3/mm3      RBC 3.41 10*6/mm3      Hemoglobin 9.8 g/dL      Hematocrit 30.9 %      MCV 90.6 fL      MCH 28.7 pg      MCHC 31.7 g/dL      RDW 12.6 %      RDW-SD 41.9 fl      MPV 10.2 fL      Platelets 247 10*3/mm3      Neutrophil % 93.0 %      Lymphocyte % 1.9 %      Monocyte % 2.8 %      Eosinophil % 1.3 %      Basophil % 0.3 %      Immature Grans % 0.7 %      Neutrophils, Absolute 12.43 10*3/mm3      Lymphocytes, Absolute 0.25 10*3/mm3      Monocytes, Absolute 0.38 10*3/mm3      Eosinophils, Absolute 0.17 10*3/mm3      Basophils, Absolute 0.04 10*3/mm3      Immature Grans, Absolute 0.10 10*3/mm3      nRBC 0.0 /100 WBC     Troponin [[id number]]  (Abnormal) Collected:  05/14/19 2238    Specimen:  Blood from Hand, Left Updated:  05/14/19 2342     Troponin T 0.031 ng/mL     Narrative:       Troponin T Reference Range:  <= 0.03 ng/mL-   Negative for AMI  >0.03 ng/mL-     Abnormal for myocardial necrosis.  Clinicians would have to utilize clinical acumen, EKG, Troponin and serial changes to determine if it is an Acute Myocardial Infarction or myocardial injury due to an underlying chronic condition.     aPTT [[id number]]  (Abnormal) Collected:  05/14/19 1835    Specimen:  Blood Updated:  05/14/19 1918     PTT 92.1 seconds     Troponin [[id number]]  (Abnormal) Collected:  05/14/19 1835    Specimen:  Blood Updated:  05/14/19 1916     Troponin T 0.042 ng/mL     Narrative:       Troponin T Reference Range:  <= 0.03 ng/mL-   Negative for AMI  >0.03 ng/mL-     Abnormal for myocardial necrosis.  Clinicians would have to utilize clinical acumen, EKG, Troponin and serial changes to determine if it is an Acute Myocardial Infarction or myocardial injury due to an underlying chronic condition.     BNP [[id number]]  (Abnormal) Collected:  05/14/19 1835    Specimen:  Blood Updated:  05/14/19 1910     proBNP 2,007.0 pg/mL     Narrative:       Among patients with dyspnea, NT-proBNP is highly sensitive for the detection of acute congestive  heart failure. In addition NT-proBNP of <300 pg/ml effectively rules out acute congestive heart failure with 99% negative predictive value.    Protime-INR [[id number]]  (Abnormal) Collected:  05/14/19 1151    Specimen:  Blood Updated:  05/14/19 1240     Protime 15.8 Seconds      INR 1.29    aPTT [[id number]]  (Normal) Collected:  05/14/19 1151    Specimen:  Blood Updated:  05/14/19 1240     PTT 28.3 seconds     CBC & Differential [[id number]] Collected:  05/14/19 1151    Specimen:  Blood Updated:  05/14/19 1221    Narrative:       The following orders were created for panel order CBC & Differential.  Procedure                               Abnormality         Status                     ---------                               -----------         ------                     CBC Auto Differential[[id number]]        Abnormal            Final result                 Please view results for these tests on the individual orders.    CBC Auto Differential [[id number]]  (Abnormal) Collected:  05/14/19 1151    Specimen:  Blood Updated:  05/14/19 1221     WBC 9.03 10*3/mm3      RBC 3.23 10*6/mm3      Hemoglobin 9.2 g/dL      Hematocrit 30.0 %      MCV 92.9 fL      MCH 28.5 pg      MCHC 30.7 g/dL      RDW 12.7 %      RDW-SD 43.1 fl      MPV 9.6 fL      Platelets 252 10*3/mm3      Neutrophil % 86.2 %      Lymphocyte % 5.5 %      Monocyte % 6.2 %      Eosinophil % 0.9 %      Basophil % 0.3 %      Immature Grans % 0.9 %      Neutrophils, Absolute 7.78 10*3/mm3      Lymphocytes, Absolute 0.50 10*3/mm3      Monocytes, Absolute 0.56 10*3/mm3      Eosinophils, Absolute 0.08 10*3/mm3      Basophils, Absolute 0.03 10*3/mm3      Immature Grans, Absolute 0.08 10*3/mm3      nRBC 0.0 /100 WBC     Blood Gas, Arterial [[id number]]  (Abnormal) Collected:  05/14/19 1101    Specimen:  Arterial Blood Updated:  05/14/19 1104     Site Arterial: left brachial     Francis's Test N/A     pH, Arterial 7.428 pH units      pCO2, Arterial 36.3 mm Hg      pO2,  Arterial 63.7 mm Hg      HCO3, Arterial 24.0 mmol/L      Base Excess, Arterial -0.2 mmol/L      O2 Saturation Calculated 92.7 %      Barometric Pressure for Blood Gas 756.0 mmHg      Modality NRB     Flow Rate 15 lpm      Set [facility] Resp Rate 24    POC Glucose Once [[id number]]  (Normal) Collected:  05/14/19 1032    Specimen:  Blood Updated:  05/14/19 1039     Glucose 128 mg/dL     CBC & Differential [[id number]] Collected:  05/14/19 0445    Specimen:  Blood Updated:  05/14/19 0717    Narrative:       The following orders were created for panel order CBC & Differential.  Procedure                               Abnormality         Status                     ---------                               -----------         ------                     CBC Auto Differential[[id number]]        Abnormal            Final result                 Please view results for these tests on the individual orders.    CBC Auto Differential [[id number]]  (Abnormal) Collected:  05/14/19 0445    Specimen:  Blood Updated:  05/14/19 0717     WBC 8.06 10*3/mm3      RBC 3.39 10*6/mm3      Hemoglobin 9.7 g/dL      Hematocrit 31.0 %      MCV 91.4 fL      MCH 28.6 pg      MCHC 31.3 g/dL      RDW 12.6 %      RDW-SD 41.9 fl      MPV 10.1 fL      Platelets 292 10*3/mm3      Neutrophil % 83.7 %      Lymphocyte % 6.9 %      Monocyte % 7.1 %      Eosinophil % 1.5 %      Basophil % 0.2 %      Immature Grans % 0.6 %      Neutrophils, Absolute 6.74 10*3/mm3      Lymphocytes, Absolute 0.56 10*3/mm3      Monocytes, Absolute 0.57 10*3/mm3      Eosinophils, Absolute 0.12 10*3/mm3      Basophils, Absolute 0.02 10*3/mm3      Immature Grans, Absolute 0.05 10*3/mm3      nRBC 0.0 /100 WBC     Basic Metabolic Panel [[id number]]  (Abnormal) Collected:  05/14/19 0445    Specimen:  Blood Updated:  05/14/19 0607     Glucose 105 mg/dL      BUN 4 mg/dL      Creatinine 0.54 mg/dL      Sodium 141 mmol/L      Potassium 3.7 mmol/L      Chloride 106 mmol/L      CO2 28.6 mmol/L       Calcium 7.7 mg/dL      eGFR Non African Amer 109 mL/min/1.73      BUN/Creatinine Ratio 7.4     Anion Gap 6.4 mmol/L     Narrative:       GFR Normal >60  Chronic Kidney Disease <60  Kidney Failure <15    Tissue Pathology Exam [[id number]] Collected:  05/11/19 1039    Specimen:  Tissue from Large Intestine, Transverse Colon Updated:  05/13/19 1408     Case Report --     Surgical Pathology Report                         Case: [id number]                                  Authorizing Provider:  Marcos Bethea MD        Collected:           05/11/2019 10:39 AM          Ordering Location:     [facility]  Received:            05/13/2019 06:06 AM                                 ENDO SUITES                                                                  Pathologist:           Lenora Moran MD                                                    Specimen:    Large Intestine, Transverse Colon, mass                                                     Final Diagnosis --     1. Transverse Colon, Biopsy: INVASIVE MODERATELY DIFFERENTIATED COLONIC ADENOCARCINOMA.    [facility]/olivia        Comment --     This case is shared internally with Dr. Nelson, who concurs.   [facility]/pk        Gross Description --     1. The specimen is received in formalin labeled with the patient's name and further designated 'Large Intestine, Transverse Colon biopsy' are multiple small fragments of gray-tan tissue. The specimen is submitted for embedding as received.         Microscopic Description --     Performed, incorporated in diagnosis.          Data Review:  Results from last 7 days   Lab Units 05/16/19  0611 05/14/19  0445 05/12/19  2305 05/12/19  0537   SODIUM mmol/L 142 141  --  145   POTASSIUM mmol/L 3.0* 3.7 4.8 3.4*   CHLORIDE mmol/L 106 106  --  109*   CO2 mmol/L 26.5 28.6  --  27.0   BUN mg/dL 4* 4*  --  5*   CREATININE mg/dL 0.46* 0.54*  --  0.65   GLUCOSE mg/dL 116* 105*  --  104*   CALCIUM mg/dL 7.6* 7.7*  --  8.4*     Results  from last 7 days   Lab Units 05/16/19  0611 05/15/19  0619 05/14/19  1151   WBC 10*3/mm3 7.23 13.37* 9.03   HEMOGLOBIN g/dL 9.8* 9.8* 9.2*   HEMATOCRIT % 31.3* 30.9* 30.0*   PLATELETS 10*3/mm3 256 247 252             Lab Results   Lab Value Date/Time    TROPONINT 0.015 05/15/2019 0619    TROPONINT 0.031 (C) 05/14/2019 2238    TROPONINT 0.042 (C) 05/14/2019 1835    TROPONINT <0.010 05/10/2019 1046               Invalid input(s): PROT, LABALBU          No results found for: POCGLU  Results from last 7 days   Lab Units 05/14/19  1151   INR  1.29*       Past Medical History:   Diagnosis Date   • Basal cell carcinoma 2013, 2014   • Bowel obstruction (CMS/HCC)    • History of nephrolithiasis    • History of small bowel obstruction    • Hypercalcemia    • Hyperparathyroidism (CMS/HCC)    • Kidney stones    • Kidney stones    • Osteopenia    • Thyroid disease        Assessment:  Active Hospital Problems    Diagnosis  POA   • **Other pulmonary embolism with acute cor pulmonale (CMS/HCC) [I26.09]  No   • Acute respiratory failure with hypoxia (CMS/HCC) [J96.01]  Unknown   • Thrombophilia (CMS/HCC) [D68.59]  No   • 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 Off heparin drip and is wanting comfort care.  She would take pills and will start Eliquis.  ??? [place] ??? Home with hospice. Try liquids.    Colt Aaron MD  5/17/2019  3:21 PM

## 2019-05-17 NOTE — PLAN OF CARE
Problem: Patient Care Overview  Goal: Plan of Care Review  Outcome: Ongoing (interventions implemented as appropriate)   05/17/19 0376   Coping/Psychosocial   Plan of Care Reviewed With patient   OTHER   Outcome Summary Vitals as charted, no c/o pain, heparin gtt and IVF stopped, pt refuses labs and PO meds, she did agree to take the eliquis that starts tonight, will continue to monitor.    Plan of Care Review   Progress no change

## 2019-05-17 NOTE — PLAN OF CARE
Problem: Patient Care Overview  Goal: Plan of Care Review  Outcome: Ongoing (interventions implemented as appropriate)   05/17/19 0658   Coping/Psychosocial   Plan of Care Reviewed With patient   OTHER   Outcome Summary Pt refusing poo meds and lab draws. Discussed heparin drip and need for labs. Will hold lab draw until pt speeks with MD. Pt verbalized that she no longer wants curative treatment. Appears anxious at times but denies prn medications. Will continue to monitor for needs   Plan of Care Review   Progress declining       Problem: Constipation (Adult)  Goal: Effective Bowel Elimination  Outcome: Ongoing (interventions implemented as appropriate)    Goal: Comfort  Outcome: Ongoing (interventions implemented as appropriate)      Problem: Pain, Acute (Adult)  Goal: Acceptable Pain Control/Comfort Level  Outcome: Ongoing (interventions implemented as appropriate)      Problem: Nutrition, Imbalanced: Inadequate Oral Intake (Adult)  Goal: Improved Oral Intake  Outcome: Ongoing (interventions implemented as appropriate)    Goal: Prevent Further Weight Loss  Outcome: Ongoing (interventions implemented as appropriate)      Problem: Fall Risk (Adult)  Goal: Absence of Fall  Outcome: Ongoing (interventions implemented as appropriate)      Problem: Skin Injury Risk (Adult)  Goal: Skin Health and Integrity  Outcome: Ongoing (interventions implemented as appropriate)

## 2019-05-17 NOTE — PROGRESS NOTES
LOS: 7 days   Patient Care Team:  Chelsi Danielle PA as PCP - General (Physician Assistant)  Chelsi Danielle PA as PCP - Claims Attributed  Meghan Melgoza APRN as Referring Physician (Pulmonary Disease)    Chief Complaint: Bilateral pulmonary emboli       Interval History: Patient is considering palliative care.  Vital signs stable and within normal limits.  No lab work from today, potassium was 3.0 yesterday, replaced.  Hemoglobin stable as of yesterday.    Objective   Vital Signs  Temp:  [96.7 °F (35.9 °C)-97.5 °F (36.4 °C)] 97 °F (36.1 °C)  Heart Rate:  [63-69] 63  Resp:  [16-18] 16  BP: (108-126)/(64-75) 108/64    Intake/Output Summary (Last 24 hours) at 5/17/2019 1619  Last data filed at 5/17/2019 0512  Gross per 24 hour   Intake 40 ml   Output --   Net 40 ml       Review of systems:  GI: positive for abdominal pain and nausea; denies diarrhea and vomiting.  Cardiovascular: Denies chest pain and palpitations  Pulmonary: Denies shortness of breath and cough    Physical Exam   Constitutional: She is oriented to person, place, and time. She appears well-developed and well-nourished. No distress.   HENT:   Head: Normocephalic and atraumatic.   Neck: No JVD present.   Cardiovascular: Normal rate, regular rhythm and normal heart sounds.   No murmur heard.  Pulmonary/Chest: Effort normal and breath sounds normal.   Abdominal: She exhibits no distension. There is tenderness.   Musculoskeletal: She exhibits no edema.   Neurological: She is alert and oriented to person, place, and time.   Skin: Skin is warm and dry.   Psychiatric: Her behavior is normal. Thought content normal.   Patient appears sad/depressed       Results Review:      Results from last 7 days   Lab Units 05/16/19  0611 05/14/19  0445 05/12/19  2305 05/12/19  0537   SODIUM mmol/L 142 141  --  145   POTASSIUM mmol/L 3.0* 3.7 4.8 3.4*   CHLORIDE mmol/L 106 106  --  109*   CO2 mmol/L 26.5 28.6  --  27.0   BUN mg/dL 4* 4*  --  5*   CREATININE  mg/dL 0.46* 0.54*  --  0.65   GLUCOSE mg/dL 116* 105*  --  104*   CALCIUM mg/dL 7.6* 7.7*  --  8.4*     Results from last 7 days   Lab Units 05/15/19  0619 05/14/19 2238 05/14/19  1835   TROPONIN T ng/mL 0.015 0.031* 0.042*     Results from last 7 days   Lab Units 05/16/19  0611 05/15/19  0619 05/14/19  1151   WBC 10*3/mm3 7.23 13.37* 9.03   HEMOGLOBIN g/dL 9.8* 9.8* 9.2*   HEMATOCRIT % 31.3* 30.9* 30.0*   PLATELETS 10*3/mm3 256 247 252     Results from last 7 days   Lab Units 05/16/19  0611 05/15/19  1802 05/15/19  0619  05/14/19  1151   INR   --   --   --   --  1.29*   APTT seconds 74.8* 104.4* 131.9*   < > 28.3    < > = values in this interval not displayed.                   I reviewed the patient's new clinical results.  I personally viewed and interpreted the patient's EKG/Telemetry data        Medication Review:     apixaban 5 mg Oral Q12H   gabapentin 100 mg Oral Q8H   ketamine 50 mg Intravenous Once   lactobacillus acidophilus 1 capsule Oral Daily   magnesium sulfate 2 g Intravenous Once   multivitamin with minerals 1 tablet Oral Daily   sodium chloride 3 mL Intravenous Q12H            Assessment/Plan    It was determined that conservative care regarding her pulmonary emboli as best.  The patient is undecided, but considering palliative care.  Vital signs stable and within normal limits at this time.  Sinus rhythm on ECG 5/14.  Heparin drip discontinued this morning.  Patient scheduled to start apixaban this evening.  Recommend continuing DOAC.  Patient feels significantly better today than yesterday.  Continue current therapy.  Will check BMP and CBC as well as mag and a.m. as patient is yet undecided on whether she would like to proceed with palliative care.        Other pulmonary embolism with acute cor pulmonale (CMS/HCC)    Abdominal pain    Malnutrition (CMS/HCC)    Kidney stone    Malignant neoplasm of transverse colon (CMS/HCC)    Liver metastases (CMS/HCC)    Thrombophilia (CMS/HCC)    Acute  respiratory failure with hypoxia (CMS/HCC)        Nataliya Donald, APRN  05/17/19  4:19 PM

## 2019-05-17 NOTE — PROGRESS NOTES
LOS: 6 days   Patient Care Team:  Chelsi Danielle PA as PCP - General (Physician Assistant)  Chelsi Danielle PA as PCP - Claims Attributed  Meghan Melgoza APRN as Referring Physician (Pulmonary Disease)    Subjective     Well-developed elderly white female resting in bed she does not appear in any acute distress and she denies shortness of breath on 4 L nasal cannula O2 and she reports she had a bowel movement today but she also says she is had lots of nausea.  After I had examined her and listen to her I asked her if there is anything I can do for her she says that she wants everything stopped except she wants to be kept comfortable she does not want to prolong her dying process..  I spoke with nursing and she is letting Dr. Aaron Know apparently she told her the same thing.    Review of Systems:          Objective     Vital Signs  Vital Sign Min/Max for last 24 hours  Temp  Min: 96.7 °F (35.9 °C)  Max: 98 °F (36.7 °C)   BP  Min: 111/67  Max: 126/75   Pulse  Min: 69  Max: 72   Resp  Min: 16  Max: 18   SpO2  Min: 94 %  Max: 99 %   Flow (L/min)  Min: 5  Max: 5   No Data Recorded        Ventilator/Non-Invasive Ventilation Settings (From admission, onward)    None                       Body mass index is 20.04 kg/m².  I/O last 3 completed shifts:  In: 2103 [P.O.:120; I.V.:1983]  Out: -   No intake/output data recorded.        Physical Exam:    General Appearance: Well-developed white female resting in bed she does not appear in any acute distress 4 L nasal cannula O2 with oxygen saturations of 98%  Eyes: Conjunctiva are clear and anicteric  ENT: Mucous membranes are moist no erythema or exudates  Neck: No adenopathy no jugular venous distention.  Lungs: Clear nonlabored symmetric expansion no wheezes rales or rhonchi  Cardiac: Regular rate rhythm no murmur heart rates in the 80s to 90  Abdomen: Midline incision with a clean dry dressing abdomen is softer than it was yesterday  : Not  examined  Musculoskeletal: She has absolutely no palpable cords no swelling no pain with dorsiflexion of her legs her legs are very soft no suggestion of edema  Skin: No jaundice no petechiae  Neuro: He is alert oriented cooperative following commands  Extremities/P Vascular: No clubbing no cyanosis no edema palpable radial and dorsalis pedis pulses bilaterally  MSE:  Seems very depressed today           Labs:  Results from last 7 days   Lab Units 05/16/19  0611 05/14/19  0445 05/12/19  2305 05/12/19  0537 05/11/19  1206 05/10/19  1046   GLUCOSE mg/dL 116* 105*  --  104*  --  108*   SODIUM mmol/L 142 141  --  145  --  142   POTASSIUM mmol/L 3.0* 3.7 4.8 3.4* 3.1* 3.0*   MAGNESIUM mg/dL  --   --   --   --   --  2.1   CO2 mmol/L 26.5 28.6  --  27.0  --  28.7   CHLORIDE mmol/L 106 106  --  109*  --  98   ANION GAP mmol/L 9.5 6.4  --  9.0  --  15.3   CREATININE mg/dL 0.46* 0.54*  --  0.65  --  0.82   BUN mg/dL 4* 4*  --  5*  --  13   BUN / CREAT RATIO  8.7 7.4  --  7.7  --  15.9   CALCIUM mg/dL 7.6* 7.7*  --  8.4*  --  9.1   EGFR IF NONAFRICN AM mL/min/1.73 132 109  --  88  --  68   ALK PHOS U/L  --   --   --   --   --  135*   TOTAL PROTEIN g/dL  --   --   --   --   --  6.6   ALT (SGPT) U/L  --   --   --   --   --  16   AST (SGOT) U/L  --   --   --   --   --  26   BILIRUBIN mg/dL  --   --   --   --   --  0.5   ALBUMIN g/dL  --   --   --   --   --  3.80   GLOBULIN gm/dL  --   --   --   --   --  2.8     Estimated Creatinine Clearance: 46.2 mL/min (A) (by C-G formula based on SCr of 0.46 mg/dL (L)).      Results from last 7 days   Lab Units 05/16/19  0611 05/15/19  0619 05/14/19  1151 05/14/19  0445 05/10/19  1046   WBC 10*3/mm3 7.23 13.37* 9.03 8.06 9.01   RBC 10*6/mm3 3.43* 3.41* 3.23* 3.39* 4.10   HEMOGLOBIN g/dL 9.8* 9.8* 9.2* 9.7* 11.9*   HEMATOCRIT % 31.3* 30.9* 30.0* 31.0* 36.9   MCV fL 91.3 90.6 92.9 91.4 90.0   MCH pg 28.6 28.7 28.5 28.6 29.0   MCHC g/dL 31.3* 31.7 30.7* 31.3* 32.2   RDW % 12.9 12.6 12.7 12.6 12.6    RDW-SD fl 43.2 41.9 43.1 41.9 41.2   MPV fL 9.9 10.2 9.6 10.1 10.0   PLATELETS 10*3/mm3 256 247 252 292 437   NEUTROPHIL % % 78.2* 93.0* 86.2* 83.7* 73.2   LYMPHOCYTE % % 8.4* 1.9* 5.5* 6.9* 12.3*   MONOCYTES % % 5.8 2.8* 6.2 7.1 11.4   EOSINOPHIL % % 5.8 1.3 0.9 1.5 2.2   BASOPHIL % % 0.3 0.3 0.3 0.2 0.3   IMM GRAN % % 1.5* 0.7* 0.9* 0.6* 0.6*   NEUTROS ABS 10*3/mm3 5.65 12.43* 7.78* 6.74 6.59   LYMPHS ABS 10*3/mm3 0.61* 0.25* 0.50* 0.56* 1.11   MONOS ABS 10*3/mm3 0.42 0.38 0.56 0.57 1.03*   EOS ABS 10*3/mm3 0.42* 0.17 0.08 0.12 0.20   BASOS ABS 10*3/mm3 0.02 0.04 0.03 0.02 0.03   IMMATURE GRANS (ABS) 10*3/mm3 0.11* 0.10* 0.08* 0.05 0.05   NRBC /100 WBC 0.0 0.0 0.0 0.0 0.0     Results from last 7 days   Lab Units 05/14/19  1101   PH, ARTERIAL pH units 7.428   PO2 ART mm Hg 63.7*   PCO2, ARTERIAL mm Hg 36.3   HCO3 ART mmol/L 24.0     Results from last 7 days   Lab Units 05/15/19  0619 05/14/19  2238 05/14/19  1835   TROPONIN T ng/mL 0.015 0.031* 0.042*     Results from last 7 days   Lab Units 05/14/19  1835   PROBNP pg/mL 2,007.0*             Results from last 7 days   Lab Units 05/14/19  1151 05/10/19  1046   INR  1.29* 1.03     Microbiology Results (last 10 days)     ** No results found for the last 240 hours. **                alvimopan 12 mg Oral BID   gabapentin 100 mg Oral Q8H   iron sucrose 300 mg Intravenous Daily   ketamine 50 mg Intravenous Once   lactobacillus acidophilus 1 capsule Oral Daily   magnesium sulfate 2 g Intravenous Once   multivitamin with minerals 1 tablet Oral Daily   sodium chloride 3 mL Intravenous Q12H       dextrose 5 % and sodium chloride 0.45 % 100 mL/hr Last Rate: 100 mL/hr (05/14/19 1979)   heparin (porcine) 18 Units/kg/hr Last Rate: 13 Units/kg/hr (05/15/19 5338)       Diagnostics:  Ct Abdomen Pelvis Without Contrast    Result Date: 5/10/2019  CT SCAN OF THE ABDOMEN AND PELVIS WITHOUT CONTRAST  HISTORY: Probable liver metastases noted on recent CT scan. Large stone in right renal  pelvis with obstruction. Abdominal pain and constipation.  The CT scan was performed as an emergency procedure through the abdomen and pelvis without contrast and compared to the recent enhanced CT scans of the abdomen and pelvis performed 4 days ago on 05/06/2019. The following findings are present: 1. Again noted are multiple low-density lesions scattered throughout the liver consistent with extensive metastatic disease and these measure up to 2.6 cm in size. One of these could be biopsied under CT guidance for diagnosis if necessary. 2. The lung bases are clear. The spleen, pancreas, both adrenal glands are unremarkable. There are multiple small gallstones within the gallbladder. 3. There is a large ovoid stone in the right renal pelvis measuring up to 13 mm and causing mild-to-moderate right renal obstruction that is unchanged from 4 days ago. There are several smaller nonobstructing stones scattered in the right kidney. The left kidney is unremarkable except for a small cortical cyst. 4. There is no aortic aneurysm or retroperitoneal lymphadenopathy. The large and small bowel loops are normal in caliber and show no inflammatory change. No abnormality is seen in the pelvis. 5. At bone windows, no suspicious bone lesions are seen.    Radiation dose reduction techniques were utilized, including automated exposure control and exposure modulation based on body size.  This report was finalized on 5/10/2019 1:55 PM by Dr. Shimon Canas M.D.      Xr Chest 2 View    Result Date: 5/10/2019  PA AND LATERAL CHEST  HISTORY: Abdominal distention and bloating for 5 to 6 weeks.  COMPARISON: None.  FINDINGS: The cardiomediastinal silhouette is within normal limits. Lungs appear clear and there is no evidence for pulmonary edema or pleural effusion or infiltrate. Surgical clips overlie the right lower neck/thyroid. There is a minor scoliotic curvature of the thoracic spine.      No evidence for active disease in the chest.   This report was finalized on 5/10/2019 12:25 PM by Dr. Raj Slater M.D.      Ct Angiogram Chest With & Without Contrast    Result Date: 5/14/2019  CT ANGIOGRAM OF THE CHEST. MULTIPLE CORONAL, SAGITTAL, AND 3-D RECONSTRUCTIONS  HISTORY: 78-year-old female with hypoxia. Recently diagnosed with metastatic colon cancer.  TECHNIQUE: Radiation dose reduction techniques were utilized, including automated exposure control and exposure modulation based on body size. CT angiogram of the chest was performed following the administration of IV contrast. Multiple coronal, sagittal, and 3-D reconstruction images were obtained. There is no previous chest CT for comparison. Correlated with recent CTs of the abdomen.  FINDINGS: There are multiple bilateral pulmonary thromboemboli within segmental pulmonary arteries. The main pulmonary arteries are patent, but there are pulmonary thromboemboli within left upper lobe, left lower lobe, right upper lobe, right middle lobe, and right lower lobe pulmonary arteries and branches. The RV to LV ratio is 1.3. Since the CT of the abdomen from 05/10/2019, there has been development of atelectatic change at both lower lobes, more prominent on the right and small bilateral pleural effusions. There is a new tiny pericardial effusion. There is no lymphadenopathy within the chest.      1. Multiple bilateral pulmonary thromboemboli within segmental pulmonary arteries and branches. The RV to LV ratio is 1.3. 2. New atelectatic change at both lower lobes and small bilateral pleural effusions. There is also a new tiny pericardial effusion.  Discussed with Dr. Michaels.  This report was finalized on 5/14/2019 4:57 PM by Dr. Em Villagran M.D.      Ct Abdomen Pelvis With Contrast    Result Date: 5/6/2019  CT ABDOMEN PELVIS W CONTRAST-  CLINICAL HISTORY: Constipation. Abdominal bloating. History of bowel obstruction. Pelvic pain.  TECHNIQUE: Spiral CT images were acquired through the abdomen and pelvis with  oral and IV contrast and were reconstructed in 3 mm thick slices.  Radiation dose reduction techniques were utilized, including automated exposure control and exposure modulation based on body size.  COMPARISON: CT scan of the abdomen and pelvis dated 09/27/2013  FINDINGS: There are multiple well-circumscribed low-density masses of varying size scattered throughout both lobes of the liver consistent with hepatic metastatic disease that are new since the preceding CT scan. The largest mass is in the left lobe of the liver, and measures 2.3 cm in diameter. There are several tiny gallstones in the lumen of gallbladder. The gallbladder is otherwise unremarkable. There is a 12 x 8 mm diameter obstructing calculus at the right ureteropelvic junction that is producing moderate right hydronephrosis. A couple additional smaller nonobstructing calculi also present in the lower pole of the right kidney. The largest measures 5 mm. No calculi are noted within the left renal collecting system. Multiple small simple bilateral renal cysts are present. The kidneys are otherwise unremarkable. A grossly intact surgical anastomosis is noted within the transverse colon. The stomach and small bowel are unremarkable. The uterus is absent.      Multiple low-density solid masses of varying size scattered throughout both lobes of the liver consistent with hepatic metastatic disease that are new since the preceding CT dated 09/27/2013. Cholelithiasis. Large approximately 12 x 8 mm diameter obstructing calculus at the right ureteropelvic junction producing moderate right hydronephrosis. A couple nonobstructing calculi also present in the lower pole of the right kidney. Bilateral simple renal cysts. Postoperative changes as noted. Otherwise unremarkable CT scan of the abdomen and pelvis.  This report was finalized on 5/6/2019 3:55 PM by Dr. Alessio Carreno M.D.      Results for orders placed during the hospital encounter of 05/10/19   Adult  Transthoracic Echo Complete W/ Cont if Necessary Per Protocol    Narrative · Calculated EF = 61.0%.  · Left ventricular systolic function is normal.  · Right atrial cavity size is severely dilated.  · Right ventricular cavity is severely dilated.  · Mild mitral valve regurgitation is present  · Severe tricuspid valve regurgitation is present.  · Calculated right ventricular systolic pressure from tricuspid   regurgitation is 55 mmHg.  · Left ventricular diastolic dysfunction (grade I a) consistent with   impaired relaxation.  · There is a small (<1cm) pericardial effusion adjacent to the left   ventricle.  · Estimated right ventricular systolic pressure from tricuspid   regurgitation is markedly elevated (>55 mmHg).  · Right heart findings are consistent with cor pulmonale.              Active Hospital Problems    Diagnosis  POA   • **Other pulmonary embolism with acute cor pulmonale (CMS/HCC) [I26.09]  No   • Acute respiratory failure with hypoxia (CMS/HCC) [J96.01]  Unknown   • Thrombophilia (CMS/HCC) [D68.59]  No   • Malignant neoplasm of transverse colon (CMS/HCC) [C18.4]  Yes   • Liver metastases (CMS/HCC) [C78.7]  Yes   • Abdominal pain [R10.9]  Yes   • Malnutrition (CMS/HCC) [E46]  Unknown   • Kidney stone [N20.0]  Yes      Resolved Hospital Problems    Diagnosis Date Resolved POA   • Hypokalemia [E87.6] 05/13/2019 Unknown         Assessment/Plan     1. Bilateral segmental subsegmental pulmonary emboli seem to be improving given the improvement in her oxygenation and decreased cardiac strain as evidenced by following troponin.  Continue heparin drip and eventually transition to oral anticoagulants.  The source of the clots is unclear no lower extremity clots is possible she had a clot in her pelvic or abdominal veins that embolized or if the embolism was from lower extremities the entire clot embolized.    2. Colon carcinoma status post 5/12/2019 transverse colectomy for near obstructing tumor with probable  liver metastases.  Oncology is following  3. Postop ileus times like that function is returning  4. Acute hypoxic respiratory failure secondary to #1 improving wean O2 as tolerated  5. Acute cor pulmonale secondary #1 seems to be improving as well line anemia mild looks like hemoglobin stable overnight    Sounds to me like patient has made up her mind and she wants to stop all therapy and to just palliative care.  I told her and the nurse that she will need to talk with the primary service regarding this.  If she changes her mind and wants to continue therapy then when she is taking p.o. well she could probably transition to a NOAC.  But right now she does not seem to want that.  I do not think I have a whole lot to add at this point in time I will see as needed    Plan for disposition:    Gareth Patel MD  05/16/19  8:15 PM    Time:

## 2019-05-18 PROCEDURE — 99232 SBSQ HOSP IP/OBS MODERATE 35: CPT | Performed by: INTERNAL MEDICINE

## 2019-05-18 PROCEDURE — 99024 POSTOP FOLLOW-UP VISIT: CPT | Performed by: SURGERY

## 2019-05-18 RX ADMIN — APIXABAN 5 MG: 5 TABLET, FILM COATED ORAL at 21:40

## 2019-05-18 RX ADMIN — MULTIPLE VITAMINS W/ MINERALS TAB 1 TABLET: TAB at 10:09

## 2019-05-18 RX ADMIN — APIXABAN 5 MG: 5 TABLET, FILM COATED ORAL at 10:09

## 2019-05-18 RX ADMIN — SODIUM CHLORIDE, PRESERVATIVE FREE 3 ML: 5 INJECTION INTRAVENOUS at 10:10

## 2019-05-18 RX ADMIN — Medication 1 CAPSULE: at 10:09

## 2019-05-18 NOTE — PROGRESS NOTES
"Patient Name: Rosa Salazar  :1940  78 y.o.      Patient Care Team:  Chelsi Danielle PA as PCP - General (Physician Assistant)  Chelsi Danielle PA as PCP - Claims Attributed  Meghan Melgoza APRN as Referring Physician (Pulmonary Disease)    Interval History:   Started on Eliquis.    Subjective:  Following for pulmonary embolus.    Objective   Vital Signs  Temp:  [97.1 °F (36.2 °C)-98.4 °F (36.9 °C)] 97.1 °F (36.2 °C)  Heart Rate:  [75-87] 87  Resp:  [16-18] 18  BP: (110-112)/(62-64) 112/62    Intake/Output Summary (Last 24 hours) at 2019 1044  Last data filed at 2019 0452  Gross per 24 hour   Intake 180 ml   Output --   Net 180 ml     Flowsheet Rows      First Filed Value   Admission Height  157.5 cm (62\") Documented at 05/10/2019 1025   Admission Weight  52.6 kg (116 lb) Documented at 05/10/2019 1041          Physical Exam:   General Appearance:  Resting on no distress   Lungs:     Clear to auscultation.  Normal respiratory effort and rate.      Heart:    Regular rhythm and normal rate, normal S1 and S2, no murmurs, gallops or rubs.     Chest Wall:    No abnormalities observed   Abdomen:     Soft, nontender, positive bowel sounds.     Extremities:   no cyanosis, clubbing or edema.  No marked joint deformities.  Adequate musculoskeletal strength.       Results Review:    Results from last 7 days   Lab Units 19  0611   SODIUM mmol/L 142   POTASSIUM mmol/L 3.0*   CHLORIDE mmol/L 106   CO2 mmol/L 26.5   BUN mg/dL 4*   CREATININE mg/dL 0.46*   GLUCOSE mg/dL 116*   CALCIUM mg/dL 7.6*     Results from last 7 days   Lab Units 05/15/19  0619 19  2238 19  1835   TROPONIN T ng/mL 0.015 0.031* 0.042*     Results from last 7 days   Lab Units 19  0611   WBC 10*3/mm3 7.23   HEMOGLOBIN g/dL 9.8*   HEMATOCRIT % 31.3*   PLATELETS 10*3/mm3 256     Results from last 7 days   Lab Units 19  0611 05/15/19  1802 05/15/19  0619  19  1151   INR   --   --   --   --  1.29* "   APTT seconds 74.8* 104.4* 131.9*   < > 28.3    < > = values in this interval not displayed.                     Medication Review:     apixaban 5 mg Oral Q12H   gabapentin 100 mg Oral Q8H   ketamine 50 mg Intravenous Once   lactobacillus acidophilus 1 capsule Oral Daily   magnesium sulfate 2 g Intravenous Once   multivitamin with minerals 1 tablet Oral Daily   sodium chloride 3 mL Intravenous Q12H             Assessment/Plan     1.  Pulmonary embolus.  Eliquis.  2.  Colon cancer with metastasis.  3.  Hypokalemia  4.  Anemia.  Hemoglobin stable.    Stable from a cardiac standpoint.  I will sign off.    Penelope Heller MD, [facility]  05/18/19  10:44 AM

## 2019-05-18 NOTE — PLAN OF CARE
Problem: Patient Care Overview  Goal: Plan of Care Review  Outcome: Ongoing (interventions implemented as appropriate)   05/18/19 0566   Coping/Psychosocial   Plan of Care Reviewed With patient   OTHER   Outcome Summary No complaints of nausea or pain. Small bm reported yesterday. Pt took eliquis po last evening. Again refused morning labs. Will monitor for safety and all needs   Plan of Care Review   Progress no change       Problem: Constipation (Adult)  Goal: Effective Bowel Elimination  Outcome: Ongoing (interventions implemented as appropriate)    Goal: Comfort  Outcome: Ongoing (interventions implemented as appropriate)      Problem: Pain, Acute (Adult)  Goal: Acceptable Pain Control/Comfort Level  Outcome: Ongoing (interventions implemented as appropriate)      Problem: Nutrition, Imbalanced: Inadequate Oral Intake (Adult)  Goal: Improved Oral Intake  Outcome: Ongoing (interventions implemented as appropriate)    Goal: Prevent Further Weight Loss  Outcome: Ongoing (interventions implemented as appropriate)      Problem: Fall Risk (Adult)  Goal: Absence of Fall  Outcome: Ongoing (interventions implemented as appropriate)      Problem: Skin Injury Risk (Adult)  Goal: Skin Health and Integrity  Outcome: Ongoing (interventions implemented as appropriate)

## 2019-05-18 NOTE — PROGRESS NOTES
"DAILY PROGRESS NOTE  [facility]    Patient Identification:  Name: Rosa Salazar  Age: 78 y.o.  Sex: female  :  1940  MRN: [id number]         Primary Care Physician: Chelsi Danielle PA    Subjective:  Interval History:She is wanting comfort care. On 2 Liter O 2.    Objective:    Scheduled Meds:    apixaban 5 mg Oral Q12H   gabapentin 100 mg Oral Q8H   ketamine 50 mg Intravenous Once   lactobacillus acidophilus 1 capsule Oral Daily   magnesium sulfate 2 g Intravenous Once   multivitamin with minerals 1 tablet Oral Daily   sodium chloride 3 mL Intravenous Q12H     Continuous Infusions:     Vital signs in last 24 hours:  Temp:  [97.1 °F (36.2 °C)-98.4 °F (36.9 °C)] 97.1 °F (36.2 °C)  Heart Rate:  [75-87] 87  Resp:  [16-18] 18  BP: (110-112)/(62-64) 112/62    Intake/Output:    Intake/Output Summary (Last 24 hours) at 2019 1213  Last data filed at 2019 0452  Gross per 24 hour   Intake 180 ml   Output --   Net 180 ml       Exam:  /62 (BP Location: Right arm, Patient Position: Sitting)   Pulse 87   Temp 97.1 °F (36.2 °C) (Oral)   Resp 18   Ht 157.5 cm (62\")   Wt 49.7 kg (109 lb 9.1 oz)   SpO2 94%   BMI 20.04 kg/m²     General Appearance:    Alert, cooperative, no distress   Head:    Normocephalic, without obvious abnormality, atraumatic   Eyes:       Throat:   Lips, tongue, gums normal   Neck:   Supple, symmetrical, trachea midline, no JVD   Lungs:     Clear to auscultation bilaterally, respirations unlabored   Chest Wall:    No tenderness or deformity    Heart:    Regular rate and rhythm, S1 and S2 normal, no murmur,no  Rub or gallop   Abdomen:     Soft, surgical changes, bowel sounds active, no masses, no organomegaly    Extremities:   Extremities normal, atraumatic, no cyanosis or edema   Pulses:      Skin:   Skin is warm and dry,  no rashes or palpable lesions   Neurologic:   no focal deficits noted      Lab Results (last 72 hours)     Procedure Component Value Units " Date/Time    Basic Metabolic Panel [[id number]]  (Abnormal) Collected:  05/16/19 0611    Specimen:  Blood Updated:  05/16/19 0712     Glucose 116 mg/dL      BUN 4 mg/dL      Creatinine 0.46 mg/dL      Sodium 142 mmol/L      Potassium 3.0 mmol/L      Chloride 106 mmol/L      CO2 26.5 mmol/L      Calcium 7.6 mg/dL      eGFR Non African Amer 132 mL/min/1.73      BUN/Creatinine Ratio 8.7     Anion Gap 9.5 mmol/L     Narrative:       GFR Normal >60  Chronic Kidney Disease <60  Kidney Failure <15    aPTT [[id number]]  (Abnormal) Collected:  05/16/19 0611    Specimen:  Blood Updated:  05/16/19 0657     PTT 74.8 seconds     CBC & Differential [[id number]] Collected:  05/16/19 0611    Specimen:  Blood Updated:  05/16/19 0647    Narrative:       The following orders were created for panel order CBC & Differential.  Procedure                               Abnormality         Status                     ---------                               -----------         ------                     CBC Auto Differential[[id number]]        Abnormal            Final result                 Please view results for these tests on the individual orders.    CBC Auto Differential [[id number]]  (Abnormal) Collected:  05/16/19 0611    Specimen:  Blood Updated:  05/16/19 0647     WBC 7.23 10*3/mm3      RBC 3.43 10*6/mm3      Hemoglobin 9.8 g/dL      Hematocrit 31.3 %      MCV 91.3 fL      MCH 28.6 pg      MCHC 31.3 g/dL      RDW 12.9 %      RDW-SD 43.2 fl      MPV 9.9 fL      Platelets 256 10*3/mm3      Neutrophil % 78.2 %      Lymphocyte % 8.4 %      Monocyte % 5.8 %      Eosinophil % 5.8 %      Basophil % 0.3 %      Immature Grans % 1.5 %      Neutrophils, Absolute 5.65 10*3/mm3      Lymphocytes, Absolute 0.61 10*3/mm3      Monocytes, Absolute 0.42 10*3/mm3      Eosinophils, Absolute 0.42 10*3/mm3      Basophils, Absolute 0.02 10*3/mm3      Immature Grans, Absolute 0.11 10*3/mm3      nRBC 0.0 /100 WBC     aPTT [[id number]]  (Abnormal) Collected:   05/15/19 1802    Specimen:  Blood Updated:  05/15/19 2129     .4 seconds     Tissue Pathology Exam [[id number]] Collected:  05/12/19 1016    Specimen:  Tissue from Large Intestine, Transverse Colon Updated:  05/15/19 1242     Addendum --     A request for KRAS, MSI was received on 5/15/19 for patient Rosa Salazar from Dr. Wasserman.  The test is to be performed on tissue from case [id number].  The case report, slides, and blocks for the cited accession were retrieved from archives.  The pathologist whose signature appears below reviewed the original pathology report, examined candidate H&E slides, and selected the block 1E and appropriate to the specifications of the ordered molecular analysis.   An addendum report will be issued when the results of this molecular test are available.    CPT Code: 05850         Case Report --     Surgical Pathology Report                         Case: [id number]                                  Authorizing Provider:  Sandro Barajas MD      Collected:           05/12/2019 10:16 AM          Ordering Location:     [facility]  Received:            05/13/2019 05:47 AM                                 MAIN OR                                                                      Pathologist:           Lenora Moran MD                                                    Specimen:    Large Intestine, Transverse Colon                                                           Final Diagnosis --     1. Transverse Colon, Segmental  Resection:  INVASIVE MODERATELY DIFFERENTIATED COLONIC       ADENOCARCINOMA.   A. Tumor site:  Transverse Colon.   B. Tumor size:  5.5 x 5.5 x 1.2 cm.   C. Macroscopic tumor perforation:  Not identified.   D. Tumor extension:  Tumor invades through the muscularis into pericolonic adipose tissue.   E. Margins:  Uninvolved by invasive carcinoma.    1. Tumor comes to within 4.0 cm of the mesenteric margin and 4.0 cm of one of the parenchymal         resection margins (closest margins).   F. Lymphovascular invasion:  Present.   G. Perineural invasion:  Not identified.   H. Tumor deposits:  Not identified.   I.  Additional findings:  One resection margin with focus of mild ischemic change.   J. Lymph Nodes:  Eight of twenty-seven lymph nodes, positive for metastatic carcinoma (8/27).    1. Largest metastatic focus measures 1.0 cm, with extracapsular extension.   K. Pathologic stage:  pT3, N2b.    Swm/kds       Synoptic Checklist --     COLON AND RECTUM: Resection, Including Transanal Disk Excision of Rectal Neoplasms  (Colon Res - All Specimens)      SPECIMEN     Procedure:    Transverse colectomy     TUMOR     Tumor Site:    Transverse colon      Histologic Type:    Adenocarcinoma      Histologic Grade:    G2: Moderately differentiated      Tumor Size:    Greatest dimension in Centimeters (cm): 5.5 Centimeters (cm)     Tumor Deposits:    Not identified      Tumor Extent:           Tumor Extension:    Tumor invades through the muscularis propria into pericolorectal tissue        Macroscopic Tumor Perforation:    Not identified      Accessory Findings:           Lymphovascular Invasion:    Present          :    Small vessel lymphovascular invasion        Perineural Invasion:    Not identified        Tumor Budding:    Number of tumor buds in one ‘hotspot’ field (total number in area = 0.785 mm2)          :    Low score (0-4)        Type of Polyp in Which Invasive Carcinoma Arose:    None identified        Treatment Effect:    No known presurgical therapy     MARGINS     Margins:    All margins are uninvolved by invasive carcinoma, high-grade dysplasia, intramucosal adenocarcinoma, and adenoma        Margins Examined:    Proximal        Margins Examined:    Distal        Margins Examined:    Radial or Mesenteric        Distance of Invasive Carcinoma from Closest Margin:    4.0 Centimeters (cm)       Closest Margin:    Radial or Mesenteric        Distance of Tumor  "from Distal Margin:    Cannot be determined: specimen unoriented     LYMPH NODES     Number of Lymph Nodes Involved:    8      Number of Lymph Nodes Examined:    27     PATHOLOGIC STAGE CLASSIFICATION (pTNM, AJCC 8th Edition)     Primary Tumor (pT):    pT3      Regional Lymph Nodes (pN):    pN2b        Gross Description --     1. Received in formalin labeled \"transverse colon\" is a 13 cm segment of transverse colon which is stapled at both ends and cannot be oriented.  There is tattoo ink at one end.  The remaining serosal surface is smooth tan-pink and there is minimal amount of adherent adipose tissue.  The serosal surface is focally umbilicated at the mid point of the segment.  The umbilicated serosa lies 3 cm from the tattoo ink.  The umbilicated serosa is inked blue and the colon is open to reveal a 90% circumferential 5.0 x 5.0 x 1.0 cm sessile centrally ulcerative mass which comes to within 4.2 cm of the tattooed resection margin and 4 cm of the opposite resection margin.  The mass extends to but does not grossly penetrate the overlying umbilicated serosal surface.  The mass also comes to within 4 cm of the mesenteric fat margin (inked black) and has a maximum depth of 1.2 cm, invading through the muscularis propria into the pericolic fat.  Following dissection of the pericolic fat reveals multiple probable lymph nodes ranging from 0.1 cm to 2.0 x 1.5 x 1.3 cm. The largest lymph node is sectioned to reveal a grossly positive lymph node conglomerate of at least two lymph nodes.  Representative sections are submitted as follows:      1A - tattooed staple line margin en face.  1B - opposite staple line margin en face.  1C-1D - mass invading underlying adipose tissue.  1E - mass towards mucosa in the direction of the tattoo ink.  1F - mass towards mucosa in the direction of the opposite resection margin.  1G-1H - mass to umbilicated serosal surface.  1I  - mesenteric fat margin perpendicular.  1J - six lymph " nodes en toto.  1K - six lymph nodes en toto.  1L - four lymph nodes en toto.  1M - two lymph nodes en toto.  1N - two grossly positive lymph nodes, one representative section of each.  1O - largest grossly positive lymph node conglomerate (at least two lymph nodes total) - one representative section.    Jap/uso/swm/kds       Microscopic Description --     Performed, incorporated in diagnosis.      Ferritin [[id number]]  (Abnormal) Collected:  05/15/19 0619    Specimen:  Blood Updated:  05/15/19 0951     Ferritin 298.00 ng/mL     Iron Profile [[id number]]  (Abnormal) Collected:  05/15/19 0619    Specimen:  Blood Updated:  05/15/19 0945     Iron 188 mcg/dL      Iron Saturation 96 %      Transferrin 131 mg/dL      TIBC 195 mcg/dL     Troponin [[id number]]  (Normal) Collected:  05/15/19 0619    Specimen:  Blood Updated:  05/15/19 0726     Troponin T 0.015 ng/mL     Narrative:       Troponin T Reference Range:  <= 0.03 ng/mL-   Negative for AMI  >0.03 ng/mL-     Abnormal for myocardial necrosis.  Clinicians would have to utilize clinical acumen, EKG, Troponin and serial changes to determine if it is an Acute Myocardial Infarction or myocardial injury due to an underlying chronic condition.     aPTT [[id number]]  (Abnormal) Collected:  05/15/19 0619    Specimen:  Blood Updated:  05/15/19 0720     .9 seconds     CBC & Differential [[id number]] Collected:  05/15/19 0619    Specimen:  Blood Updated:  05/15/19 0709    Narrative:       The following orders were created for panel order CBC & Differential.  Procedure                               Abnormality         Status                     ---------                               -----------         ------                     CBC Auto Differential[[id number]]        Abnormal            Final result                 Please view results for these tests on the individual orders.    CBC Auto Differential [[id number]]  (Abnormal) Collected:  05/15/19 0619    Specimen:  Blood  Updated:  05/15/19 0709     WBC 13.37 10*3/mm3      RBC 3.41 10*6/mm3      Hemoglobin 9.8 g/dL      Hematocrit 30.9 %      MCV 90.6 fL      MCH 28.7 pg      MCHC 31.7 g/dL      RDW 12.6 %      RDW-SD 41.9 fl      MPV 10.2 fL      Platelets 247 10*3/mm3      Neutrophil % 93.0 %      Lymphocyte % 1.9 %      Monocyte % 2.8 %      Eosinophil % 1.3 %      Basophil % 0.3 %      Immature Grans % 0.7 %      Neutrophils, Absolute 12.43 10*3/mm3      Lymphocytes, Absolute 0.25 10*3/mm3      Monocytes, Absolute 0.38 10*3/mm3      Eosinophils, Absolute 0.17 10*3/mm3      Basophils, Absolute 0.04 10*3/mm3      Immature Grans, Absolute 0.10 10*3/mm3      nRBC 0.0 /100 WBC     Troponin [[id number]]  (Abnormal) Collected:  05/14/19 2238    Specimen:  Blood from Hand, Left Updated:  05/14/19 2342     Troponin T 0.031 ng/mL     Narrative:       Troponin T Reference Range:  <= 0.03 ng/mL-   Negative for AMI  >0.03 ng/mL-     Abnormal for myocardial necrosis.  Clinicians would have to utilize clinical acumen, EKG, Troponin and serial changes to determine if it is an Acute Myocardial Infarction or myocardial injury due to an underlying chronic condition.     aPTT [[id number]]  (Abnormal) Collected:  05/14/19 1835    Specimen:  Blood Updated:  05/14/19 1918     PTT 92.1 seconds     Troponin [[id number]]  (Abnormal) Collected:  05/14/19 1835    Specimen:  Blood Updated:  05/14/19 1916     Troponin T 0.042 ng/mL     Narrative:       Troponin T Reference Range:  <= 0.03 ng/mL-   Negative for AMI  >0.03 ng/mL-     Abnormal for myocardial necrosis.  Clinicians would have to utilize clinical acumen, EKG, Troponin and serial changes to determine if it is an Acute Myocardial Infarction or myocardial injury due to an underlying chronic condition.     BNP [[id number]]  (Abnormal) Collected:  05/14/19 1835    Specimen:  Blood Updated:  05/14/19 1910     proBNP 2,007.0 pg/mL     Narrative:       Among patients with dyspnea, NT-proBNP is highly sensitive  for the detection of acute congestive heart failure. In addition NT-proBNP of <300 pg/ml effectively rules out acute congestive heart failure with 99% negative predictive value.    Protime-INR [[id number]]  (Abnormal) Collected:  05/14/19 1151    Specimen:  Blood Updated:  05/14/19 1240     Protime 15.8 Seconds      INR 1.29    aPTT [[id number]]  (Normal) Collected:  05/14/19 1151    Specimen:  Blood Updated:  05/14/19 1240     PTT 28.3 seconds     CBC & Differential [[id number]] Collected:  05/14/19 1151    Specimen:  Blood Updated:  05/14/19 1221    Narrative:       The following orders were created for panel order CBC & Differential.  Procedure                               Abnormality         Status                     ---------                               -----------         ------                     CBC Auto Differential[[id number]]        Abnormal            Final result                 Please view results for these tests on the individual orders.    CBC Auto Differential [[id number]]  (Abnormal) Collected:  05/14/19 1151    Specimen:  Blood Updated:  05/14/19 1221     WBC 9.03 10*3/mm3      RBC 3.23 10*6/mm3      Hemoglobin 9.2 g/dL      Hematocrit 30.0 %      MCV 92.9 fL      MCH 28.5 pg      MCHC 30.7 g/dL      RDW 12.7 %      RDW-SD 43.1 fl      MPV 9.6 fL      Platelets 252 10*3/mm3      Neutrophil % 86.2 %      Lymphocyte % 5.5 %      Monocyte % 6.2 %      Eosinophil % 0.9 %      Basophil % 0.3 %      Immature Grans % 0.9 %      Neutrophils, Absolute 7.78 10*3/mm3      Lymphocytes, Absolute 0.50 10*3/mm3      Monocytes, Absolute 0.56 10*3/mm3      Eosinophils, Absolute 0.08 10*3/mm3      Basophils, Absolute 0.03 10*3/mm3      Immature Grans, Absolute 0.08 10*3/mm3      nRBC 0.0 /100 WBC     Blood Gas, Arterial [[id number]]  (Abnormal) Collected:  05/14/19 1101    Specimen:  Arterial Blood Updated:  05/14/19 1104     Site Arterial: left brachial     Francis's Test N/A     pH, Arterial 7.428 pH units       pCO2, Arterial 36.3 mm Hg      pO2, Arterial 63.7 mm Hg      HCO3, Arterial 24.0 mmol/L      Base Excess, Arterial -0.2 mmol/L      O2 Saturation Calculated 92.7 %      Barometric Pressure for Blood Gas 756.0 mmHg      Modality NRB     Flow Rate 15 lpm      Set [facility] Resp Rate 24    POC Glucose Once [[id number]]  (Normal) Collected:  05/14/19 1032    Specimen:  Blood Updated:  05/14/19 1039     Glucose 128 mg/dL     CBC & Differential [[id number]] Collected:  05/14/19 0445    Specimen:  Blood Updated:  05/14/19 0717    Narrative:       The following orders were created for panel order CBC & Differential.  Procedure                               Abnormality         Status                     ---------                               -----------         ------                     CBC Auto Differential[[id number]]        Abnormal            Final result                 Please view results for these tests on the individual orders.    CBC Auto Differential [[id number]]  (Abnormal) Collected:  05/14/19 0445    Specimen:  Blood Updated:  05/14/19 0717     WBC 8.06 10*3/mm3      RBC 3.39 10*6/mm3      Hemoglobin 9.7 g/dL      Hematocrit 31.0 %      MCV 91.4 fL      MCH 28.6 pg      MCHC 31.3 g/dL      RDW 12.6 %      RDW-SD 41.9 fl      MPV 10.1 fL      Platelets 292 10*3/mm3      Neutrophil % 83.7 %      Lymphocyte % 6.9 %      Monocyte % 7.1 %      Eosinophil % 1.5 %      Basophil % 0.2 %      Immature Grans % 0.6 %      Neutrophils, Absolute 6.74 10*3/mm3      Lymphocytes, Absolute 0.56 10*3/mm3      Monocytes, Absolute 0.57 10*3/mm3      Eosinophils, Absolute 0.12 10*3/mm3      Basophils, Absolute 0.02 10*3/mm3      Immature Grans, Absolute 0.05 10*3/mm3      nRBC 0.0 /100 WBC     Basic Metabolic Panel [[id number]]  (Abnormal) Collected:  05/14/19 0445    Specimen:  Blood Updated:  05/14/19 0607     Glucose 105 mg/dL      BUN 4 mg/dL      Creatinine 0.54 mg/dL      Sodium 141 mmol/L      Potassium 3.7 mmol/L      Chloride 106  mmol/L      CO2 28.6 mmol/L      Calcium 7.7 mg/dL      eGFR Non African Amer 109 mL/min/1.73      BUN/Creatinine Ratio 7.4     Anion Gap 6.4 mmol/L     Narrative:       GFR Normal >60  Chronic Kidney Disease <60  Kidney Failure <15    Tissue Pathology Exam [[id number]] Collected:  05/11/19 1039    Specimen:  Tissue from Large Intestine, Transverse Colon Updated:  05/13/19 1408     Case Report --     Surgical Pathology Report                         Case: [id number]                                  Authorizing Provider:  Marcos Bethea MD        Collected:           05/11/2019 10:39 AM          Ordering Location:     [facility]  Received:            05/13/2019 06:06 AM                                 ENDO SUITES                                                                  Pathologist:           Lenora Moran MD                                                    Specimen:    Large Intestine, Transverse Colon, mass                                                     Final Diagnosis --     1. Transverse Colon, Biopsy: INVASIVE MODERATELY DIFFERENTIATED COLONIC ADENOCARCINOMA.    swm/ivette        Comment --     This case is shared internally with Dr. Nelson, who concurs.   m/oliviam        Gross Description --     1. The specimen is received in formalin labeled with the patient's name and further designated 'Large Intestine, Transverse Colon biopsy' are multiple small fragments of gray-tan tissue. The specimen is submitted for embedding as received.         Microscopic Description --     Performed, incorporated in diagnosis.          Data Review:  Results from last 7 days   Lab Units 05/16/19  0611 05/14/19  0445 05/12/19  2305 05/12/19  0537   SODIUM mmol/L 142 141  --  145   POTASSIUM mmol/L 3.0* 3.7 4.8 3.4*   CHLORIDE mmol/L 106 106  --  109*   CO2 mmol/L 26.5 28.6  --  27.0   BUN mg/dL 4* 4*  --  5*   CREATININE mg/dL 0.46* 0.54*  --  0.65   GLUCOSE mg/dL 116* 105*  --  104*   CALCIUM mg/dL  7.6* 7.7*  --  8.4*     Results from last 7 days   Lab Units 05/16/19  0611 05/15/19  0619 05/14/19  1151   WBC 10*3/mm3 7.23 13.37* 9.03   HEMOGLOBIN g/dL 9.8* 9.8* 9.2*   HEMATOCRIT % 31.3* 30.9* 30.0*   PLATELETS 10*3/mm3 256 247 252             Lab Results   Lab Value Date/Time    TROPONINT 0.015 05/15/2019 0619    TROPONINT 0.031 (C) 05/14/2019 2238    TROPONINT 0.042 (C) 05/14/2019 1835    TROPONINT <0.010 05/10/2019 1046               Invalid input(s): PROT, LABALBU          No results found for: POCGLU  Results from last 7 days   Lab Units 05/14/19  1151   INR  1.29*       Past Medical History:   Diagnosis Date   • Basal cell carcinoma 2013, 2014   • Bowel obstruction (CMS/HCC)    • History of nephrolithiasis    • History of small bowel obstruction    • Hypercalcemia    • Hyperparathyroidism (CMS/HCC)    • Kidney stones    • Kidney stones    • Osteopenia    • Thyroid disease        Assessment:  Active Hospital Problems    Diagnosis  POA   • **Other pulmonary embolism with acute cor pulmonale (CMS/HCC) [I26.09]  No   • Acute respiratory failure with hypoxia (CMS/HCC) [J96.01]  Unknown   • Thrombophilia (CMS/HCC) [D68.59]  No   • 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 Off heparin drip and is wanting comfort care.  She would take pills and will start Eliquis.  ??? [place] ??? Home with hospice  ?? 1-2 days??. Try diet    Colt Aaron MD  5/18/2019  12:13 PM

## 2019-05-18 NOTE — PROGRESS NOTES
Chief Complaint:    S/P Transverse colon resection, POD 6    Subjective:    The patient is feeling well but has a marginal appetite.  She is passing flatus and having some liquid bowel movements.    Objective:    Temp:  [97.1 °F (36.2 °C)-98.4 °F (36.9 °C)] 97.1 °F (36.2 °C)  Heart Rate:  [75-87] 87  Resp:  [16-18] 18  BP: (110-112)/(62-64) 112/62    Physical Exam   Constitutional: She is cooperative. She does not appear ill. No distress.   Abdominal: Soft. There is no tenderness.   Incision: Intact with no evidence of infection.   Neurological: She is alert.       Results:    There are no new labs.    Impression/Plan:    The patient is POD 6 from a transverse colon resection for obstructing colon cancer.  Her bowel function is returning and her appetite is slowly improving.  She will be advanced to a GI soft diet.    Hospice consult has been requested for outpatient hospice services.    I anticipate discharge to home tomorrow.    Lauri Goldman Jr., M.D.

## 2019-05-19 PROCEDURE — 99024 POSTOP FOLLOW-UP VISIT: CPT | Performed by: SURGERY

## 2019-05-19 RX ADMIN — ACETAMINOPHEN 500 MG: 500 TABLET, FILM COATED ORAL at 15:38

## 2019-05-19 RX ADMIN — ACETAMINOPHEN 500 MG: 500 TABLET, FILM COATED ORAL at 21:26

## 2019-05-19 RX ADMIN — SODIUM CHLORIDE, PRESERVATIVE FREE 3 ML: 5 INJECTION INTRAVENOUS at 21:26

## 2019-05-19 RX ADMIN — MULTIPLE VITAMINS W/ MINERALS TAB 1 TABLET: TAB at 08:41

## 2019-05-19 RX ADMIN — GABAPENTIN 100 MG: 100 CAPSULE ORAL at 21:26

## 2019-05-19 RX ADMIN — APIXABAN 5 MG: 5 TABLET, FILM COATED ORAL at 08:41

## 2019-05-19 RX ADMIN — SODIUM CHLORIDE, PRESERVATIVE FREE 3 ML: 5 INJECTION INTRAVENOUS at 08:41

## 2019-05-19 RX ADMIN — APIXABAN 5 MG: 5 TABLET, FILM COATED ORAL at 21:26

## 2019-05-19 RX ADMIN — Medication 1 CAPSULE: at 08:41

## 2019-05-19 NOTE — PROGRESS NOTES
"DAILY PROGRESS NOTE  [facility]    Patient Identification:  Name: Rosa Salazar  Age: 78 y.o.  Sex: female  :  1940  MRN: [id number]         Primary Care Physician: Chelsi Danielle PA    Subjective:  Interval History:She is wanting comfort care. Off O 2.    Objective:    Scheduled Meds:    apixaban 5 mg Oral Q12H   gabapentin 100 mg Oral Q8H   ketamine 50 mg Intravenous Once   lactobacillus acidophilus 1 capsule Oral Daily   magnesium sulfate 2 g Intravenous Once   multivitamin with minerals 1 tablet Oral Daily   sodium chloride 3 mL Intravenous Q12H     Continuous Infusions:     Vital signs in last 24 hours:  Temp:  [97.1 °F (36.2 °C)-98.4 °F (36.9 °C)] 98.4 °F (36.9 °C)  Heart Rate:  [65-95] 65  Resp:  [16] 16  BP: (108-128)/(65-71) 108/65    Intake/Output:    Intake/Output Summary (Last 24 hours) at 2019 1118  Last data filed at 2019 0300  Gross per 24 hour   Intake 40 ml   Output --   Net 40 ml       Exam:  /65 (BP Location: Right arm, Patient Position: Lying)   Pulse 65   Temp 98.4 °F (36.9 °C) (Oral)   Resp 16   Ht 157.5 cm (62\")   Wt 49.7 kg (109 lb 9.1 oz)   SpO2 98%   BMI 20.04 kg/m²     General Appearance:    Alert, cooperative, no distress   Head:    Normocephalic, without obvious abnormality, atraumatic   Eyes:       Throat:   Lips, tongue, gums normal   Neck:   Supple, symmetrical, trachea midline, no JVD   Lungs:     Clear to auscultation bilaterally, respirations unlabored   Chest Wall:    No tenderness or deformity    Heart:    Regular rate and rhythm, S1 and S2 normal, no murmur,no  Rub or gallop   Abdomen:     Soft, surgical changes, bowel sounds active, no masses, no organomegaly    Extremities:   Extremities normal, atraumatic, no cyanosis or edema   Pulses:      Skin:   Skin is warm and dry,  no rashes or palpable lesions   Neurologic:   no focal deficits noted      Lab Results (last 72 hours)     Procedure Component Value Units Date/Time    " Basic Metabolic Panel [[id number]]  (Abnormal) Collected:  05/16/19 0611    Specimen:  Blood Updated:  05/16/19 0712     Glucose 116 mg/dL      BUN 4 mg/dL      Creatinine 0.46 mg/dL      Sodium 142 mmol/L      Potassium 3.0 mmol/L      Chloride 106 mmol/L      CO2 26.5 mmol/L      Calcium 7.6 mg/dL      eGFR Non African Amer 132 mL/min/1.73      BUN/Creatinine Ratio 8.7     Anion Gap 9.5 mmol/L     Narrative:       GFR Normal >60  Chronic Kidney Disease <60  Kidney Failure <15    aPTT [[id number]]  (Abnormal) Collected:  05/16/19 0611    Specimen:  Blood Updated:  05/16/19 0657     PTT 74.8 seconds     CBC & Differential [[id number]] Collected:  05/16/19 0611    Specimen:  Blood Updated:  05/16/19 0647    Narrative:       The following orders were created for panel order CBC & Differential.  Procedure                               Abnormality         Status                     ---------                               -----------         ------                     CBC Auto Differential[[id number]]        Abnormal            Final result                 Please view results for these tests on the individual orders.    CBC Auto Differential [[id number]]  (Abnormal) Collected:  05/16/19 0611    Specimen:  Blood Updated:  05/16/19 0647     WBC 7.23 10*3/mm3      RBC 3.43 10*6/mm3      Hemoglobin 9.8 g/dL      Hematocrit 31.3 %      MCV 91.3 fL      MCH 28.6 pg      MCHC 31.3 g/dL      RDW 12.9 %      RDW-SD 43.2 fl      MPV 9.9 fL      Platelets 256 10*3/mm3      Neutrophil % 78.2 %      Lymphocyte % 8.4 %      Monocyte % 5.8 %      Eosinophil % 5.8 %      Basophil % 0.3 %      Immature Grans % 1.5 %      Neutrophils, Absolute 5.65 10*3/mm3      Lymphocytes, Absolute 0.61 10*3/mm3      Monocytes, Absolute 0.42 10*3/mm3      Eosinophils, Absolute 0.42 10*3/mm3      Basophils, Absolute 0.02 10*3/mm3      Immature Grans, Absolute 0.11 10*3/mm3      nRBC 0.0 /100 WBC     aPTT [[id number]]  (Abnormal) Collected:  05/15/19 180     Specimen:  Blood Updated:  05/15/19 2129     .4 seconds     Tissue Pathology Exam [[id number]] Collected:  05/12/19 1016    Specimen:  Tissue from Large Intestine, Transverse Colon Updated:  05/15/19 1242     Addendum --     A request for KRAS, MSI was received on 5/15/19 for patient Rosa Salazar from Dr. Wasserman.  The test is to be performed on tissue from case [id number].  The case report, slides, and blocks for the cited accession were retrieved from archives.  The pathologist whose signature appears below reviewed the original pathology report, examined candidate H&E slides, and selected the block 1E and appropriate to the specifications of the ordered molecular analysis.   An addendum report will be issued when the results of this molecular test are available.    CPT Code: 83094         Case Report --     Surgical Pathology Report                         Case: [id number]                                  Authorizing Provider:  Sandro Barajas MD      Collected:           05/12/2019 10:16 AM          Ordering Location:     [facility]  Received:            05/13/2019 05:47 AM                                 MAIN OR                                                                      Pathologist:           Lenora Moran MD                                                    Specimen:    Large Intestine, Transverse Colon                                                           Final Diagnosis --     1. Transverse Colon, Segmental  Resection:  INVASIVE MODERATELY DIFFERENTIATED COLONIC       ADENOCARCINOMA.   A. Tumor site:  Transverse Colon.   B. Tumor size:  5.5 x 5.5 x 1.2 cm.   C. Macroscopic tumor perforation:  Not identified.   D. Tumor extension:  Tumor invades through the muscularis into pericolonic adipose tissue.   E. Margins:  Uninvolved by invasive carcinoma.    1. Tumor comes to within 4.0 cm of the mesenteric margin and 4.0 cm of one of the parenchymal        resection  margins (closest margins).   F. Lymphovascular invasion:  Present.   G. Perineural invasion:  Not identified.   H. Tumor deposits:  Not identified.   I.  Additional findings:  One resection margin with focus of mild ischemic change.   J. Lymph Nodes:  Eight of twenty-seven lymph nodes, positive for metastatic carcinoma (8/27).    1. Largest metastatic focus measures 1.0 cm, with extracapsular extension.   K. Pathologic stage:  pT3, N2b.    Swm/kds       Synoptic Checklist --     COLON AND RECTUM: Resection, Including Transanal Disk Excision of Rectal Neoplasms  (Colon Res - All Specimens)      SPECIMEN     Procedure:    Transverse colectomy     TUMOR     Tumor Site:    Transverse colon      Histologic Type:    Adenocarcinoma      Histologic Grade:    G2: Moderately differentiated      Tumor Size:    Greatest dimension in Centimeters (cm): 5.5 Centimeters (cm)     Tumor Deposits:    Not identified      Tumor Extent:           Tumor Extension:    Tumor invades through the muscularis propria into pericolorectal tissue        Macroscopic Tumor Perforation:    Not identified      Accessory Findings:           Lymphovascular Invasion:    Present          :    Small vessel lymphovascular invasion        Perineural Invasion:    Not identified        Tumor Budding:    Number of tumor buds in one ‘hotspot’ field (total number in area = 0.785 mm2)          :    Low score (0-4)        Type of Polyp in Which Invasive Carcinoma Arose:    None identified        Treatment Effect:    No known presurgical therapy     MARGINS     Margins:    All margins are uninvolved by invasive carcinoma, high-grade dysplasia, intramucosal adenocarcinoma, and adenoma        Margins Examined:    Proximal        Margins Examined:    Distal        Margins Examined:    Radial or Mesenteric        Distance of Invasive Carcinoma from Closest Margin:    4.0 Centimeters (cm)       Closest Margin:    Radial or Mesenteric        Distance of Tumor from Distal  "Margin:    Cannot be determined: specimen unoriented     LYMPH NODES     Number of Lymph Nodes Involved:    8      Number of Lymph Nodes Examined:    27     PATHOLOGIC STAGE CLASSIFICATION (pTNM, AJCC 8th Edition)     Primary Tumor (pT):    pT3      Regional Lymph Nodes (pN):    pN2b        Gross Description --     1. Received in formalin labeled \"transverse colon\" is a 13 cm segment of transverse colon which is stapled at both ends and cannot be oriented.  There is tattoo ink at one end.  The remaining serosal surface is smooth tan-pink and there is minimal amount of adherent adipose tissue.  The serosal surface is focally umbilicated at the mid point of the segment.  The umbilicated serosa lies 3 cm from the tattoo ink.  The umbilicated serosa is inked blue and the colon is open to reveal a 90% circumferential 5.0 x 5.0 x 1.0 cm sessile centrally ulcerative mass which comes to within 4.2 cm of the tattooed resection margin and 4 cm of the opposite resection margin.  The mass extends to but does not grossly penetrate the overlying umbilicated serosal surface.  The mass also comes to within 4 cm of the mesenteric fat margin (inked black) and has a maximum depth of 1.2 cm, invading through the muscularis propria into the pericolic fat.  Following dissection of the pericolic fat reveals multiple probable lymph nodes ranging from 0.1 cm to 2.0 x 1.5 x 1.3 cm. The largest lymph node is sectioned to reveal a grossly positive lymph node conglomerate of at least two lymph nodes.  Representative sections are submitted as follows:      1A - tattooed staple line margin en face.  1B - opposite staple line margin en face.  1C-1D - mass invading underlying adipose tissue.  1E - mass towards mucosa in the direction of the tattoo ink.  1F - mass towards mucosa in the direction of the opposite resection margin.  1G-1H - mass to umbilicated serosal surface.  1I  - mesenteric fat margin perpendicular.  1J - six lymph nodes en " toto.  1K - six lymph nodes en toto.  1L - four lymph nodes en toto.  1M - two lymph nodes en toto.  1N - two grossly positive lymph nodes, one representative section of each.  1O - largest grossly positive lymph node conglomerate (at least two lymph nodes total) - one representative section.    Jap/uso/swm/kds       Microscopic Description --     Performed, incorporated in diagnosis.      Ferritin [[id number]]  (Abnormal) Collected:  05/15/19 0619    Specimen:  Blood Updated:  05/15/19 0951     Ferritin 298.00 ng/mL     Iron Profile [[id number]]  (Abnormal) Collected:  05/15/19 0619    Specimen:  Blood Updated:  05/15/19 0945     Iron 188 mcg/dL      Iron Saturation 96 %      Transferrin 131 mg/dL      TIBC 195 mcg/dL     Troponin [[id number]]  (Normal) Collected:  05/15/19 0619    Specimen:  Blood Updated:  05/15/19 0726     Troponin T 0.015 ng/mL     Narrative:       Troponin T Reference Range:  <= 0.03 ng/mL-   Negative for AMI  >0.03 ng/mL-     Abnormal for myocardial necrosis.  Clinicians would have to utilize clinical acumen, EKG, Troponin and serial changes to determine if it is an Acute Myocardial Infarction or myocardial injury due to an underlying chronic condition.     aPTT [[id number]]  (Abnormal) Collected:  05/15/19 0619    Specimen:  Blood Updated:  05/15/19 0720     .9 seconds     CBC & Differential [[id number]] Collected:  05/15/19 0619    Specimen:  Blood Updated:  05/15/19 0709    Narrative:       The following orders were created for panel order CBC & Differential.  Procedure                               Abnormality         Status                     ---------                               -----------         ------                     CBC Auto Differential[[id number]]        Abnormal            Final result                 Please view results for these tests on the individual orders.    CBC Auto Differential [[id number]]  (Abnormal) Collected:  05/15/19 0619    Specimen:  Blood Updated:   05/15/19 0709     WBC 13.37 10*3/mm3      RBC 3.41 10*6/mm3      Hemoglobin 9.8 g/dL      Hematocrit 30.9 %      MCV 90.6 fL      MCH 28.7 pg      MCHC 31.7 g/dL      RDW 12.6 %      RDW-SD 41.9 fl      MPV 10.2 fL      Platelets 247 10*3/mm3      Neutrophil % 93.0 %      Lymphocyte % 1.9 %      Monocyte % 2.8 %      Eosinophil % 1.3 %      Basophil % 0.3 %      Immature Grans % 0.7 %      Neutrophils, Absolute 12.43 10*3/mm3      Lymphocytes, Absolute 0.25 10*3/mm3      Monocytes, Absolute 0.38 10*3/mm3      Eosinophils, Absolute 0.17 10*3/mm3      Basophils, Absolute 0.04 10*3/mm3      Immature Grans, Absolute 0.10 10*3/mm3      nRBC 0.0 /100 WBC     Troponin [[id number]]  (Abnormal) Collected:  05/14/19 2238    Specimen:  Blood from Hand, Left Updated:  05/14/19 2342     Troponin T 0.031 ng/mL     Narrative:       Troponin T Reference Range:  <= 0.03 ng/mL-   Negative for AMI  >0.03 ng/mL-     Abnormal for myocardial necrosis.  Clinicians would have to utilize clinical acumen, EKG, Troponin and serial changes to determine if it is an Acute Myocardial Infarction or myocardial injury due to an underlying chronic condition.     aPTT [[id number]]  (Abnormal) Collected:  05/14/19 1835    Specimen:  Blood Updated:  05/14/19 1918     PTT 92.1 seconds     Troponin [[id number]]  (Abnormal) Collected:  05/14/19 1835    Specimen:  Blood Updated:  05/14/19 1916     Troponin T 0.042 ng/mL     Narrative:       Troponin T Reference Range:  <= 0.03 ng/mL-   Negative for AMI  >0.03 ng/mL-     Abnormal for myocardial necrosis.  Clinicians would have to utilize clinical acumen, EKG, Troponin and serial changes to determine if it is an Acute Myocardial Infarction or myocardial injury due to an underlying chronic condition.     BNP [[id number]]  (Abnormal) Collected:  05/14/19 1835    Specimen:  Blood Updated:  05/14/19 1910     proBNP 2,007.0 pg/mL     Narrative:       Among patients with dyspnea, NT-proBNP is highly sensitive for the  detection of acute congestive heart failure. In addition NT-proBNP of <300 pg/ml effectively rules out acute congestive heart failure with 99% negative predictive value.    Protime-INR [[id number]]  (Abnormal) Collected:  05/14/19 1151    Specimen:  Blood Updated:  05/14/19 1240     Protime 15.8 Seconds      INR 1.29    aPTT [[id number]]  (Normal) Collected:  05/14/19 1151    Specimen:  Blood Updated:  05/14/19 1240     PTT 28.3 seconds     CBC & Differential [[id number]] Collected:  05/14/19 1151    Specimen:  Blood Updated:  05/14/19 1221    Narrative:       The following orders were created for panel order CBC & Differential.  Procedure                               Abnormality         Status                     ---------                               -----------         ------                     CBC Auto Differential[[id number]]        Abnormal            Final result                 Please view results for these tests on the individual orders.    CBC Auto Differential [[id number]]  (Abnormal) Collected:  05/14/19 1151    Specimen:  Blood Updated:  05/14/19 1221     WBC 9.03 10*3/mm3      RBC 3.23 10*6/mm3      Hemoglobin 9.2 g/dL      Hematocrit 30.0 %      MCV 92.9 fL      MCH 28.5 pg      MCHC 30.7 g/dL      RDW 12.7 %      RDW-SD 43.1 fl      MPV 9.6 fL      Platelets 252 10*3/mm3      Neutrophil % 86.2 %      Lymphocyte % 5.5 %      Monocyte % 6.2 %      Eosinophil % 0.9 %      Basophil % 0.3 %      Immature Grans % 0.9 %      Neutrophils, Absolute 7.78 10*3/mm3      Lymphocytes, Absolute 0.50 10*3/mm3      Monocytes, Absolute 0.56 10*3/mm3      Eosinophils, Absolute 0.08 10*3/mm3      Basophils, Absolute 0.03 10*3/mm3      Immature Grans, Absolute 0.08 10*3/mm3      nRBC 0.0 /100 WBC     Blood Gas, Arterial [[id number]]  (Abnormal) Collected:  05/14/19 1101    Specimen:  Arterial Blood Updated:  05/14/19 1104     Site Arterial: left brachial     Francis's Test N/A     pH, Arterial 7.428 pH units      pCO2,  Arterial 36.3 mm Hg      pO2, Arterial 63.7 mm Hg      HCO3, Arterial 24.0 mmol/L      Base Excess, Arterial -0.2 mmol/L      O2 Saturation Calculated 92.7 %      Barometric Pressure for Blood Gas 756.0 mmHg      Modality NRB     Flow Rate 15 lpm      Set [facility] Resp Rate 24    POC Glucose Once [[id number]]  (Normal) Collected:  05/14/19 1032    Specimen:  Blood Updated:  05/14/19 1039     Glucose 128 mg/dL     CBC & Differential [[id number]] Collected:  05/14/19 0445    Specimen:  Blood Updated:  05/14/19 0717    Narrative:       The following orders were created for panel order CBC & Differential.  Procedure                               Abnormality         Status                     ---------                               -----------         ------                     CBC Auto Differential[[id number]]        Abnormal            Final result                 Please view results for these tests on the individual orders.    CBC Auto Differential [[id number]]  (Abnormal) Collected:  05/14/19 0445    Specimen:  Blood Updated:  05/14/19 0717     WBC 8.06 10*3/mm3      RBC 3.39 10*6/mm3      Hemoglobin 9.7 g/dL      Hematocrit 31.0 %      MCV 91.4 fL      MCH 28.6 pg      MCHC 31.3 g/dL      RDW 12.6 %      RDW-SD 41.9 fl      MPV 10.1 fL      Platelets 292 10*3/mm3      Neutrophil % 83.7 %      Lymphocyte % 6.9 %      Monocyte % 7.1 %      Eosinophil % 1.5 %      Basophil % 0.2 %      Immature Grans % 0.6 %      Neutrophils, Absolute 6.74 10*3/mm3      Lymphocytes, Absolute 0.56 10*3/mm3      Monocytes, Absolute 0.57 10*3/mm3      Eosinophils, Absolute 0.12 10*3/mm3      Basophils, Absolute 0.02 10*3/mm3      Immature Grans, Absolute 0.05 10*3/mm3      nRBC 0.0 /100 WBC     Basic Metabolic Panel [[id number]]  (Abnormal) Collected:  05/14/19 0445    Specimen:  Blood Updated:  05/14/19 0607     Glucose 105 mg/dL      BUN 4 mg/dL      Creatinine 0.54 mg/dL      Sodium 141 mmol/L      Potassium 3.7 mmol/L      Chloride 106 mmol/L       CO2 28.6 mmol/L      Calcium 7.7 mg/dL      eGFR Non African Amer 109 mL/min/1.73      BUN/Creatinine Ratio 7.4     Anion Gap 6.4 mmol/L     Narrative:       GFR Normal >60  Chronic Kidney Disease <60  Kidney Failure <15    Tissue Pathology Exam [[id number]] Collected:  05/11/19 1039    Specimen:  Tissue from Large Intestine, Transverse Colon Updated:  05/13/19 1408     Case Report --     Surgical Pathology Report                         Case: [id number]                                  Authorizing Provider:  Marcos Bethea MD        Collected:           05/11/2019 10:39 AM          Ordering Location:     [facility]  Received:            05/13/2019 06:06 AM                                 ENDO SUITES                                                                  Pathologist:           Lenora Moran MD                                                    Specimen:    Large Intestine, Transverse Colon, mass                                                     Final Diagnosis --     1. Transverse Colon, Biopsy: INVASIVE MODERATELY DIFFERENTIATED COLONIC ADENOCARCINOMA.    [facility]/[place]        Comment --     This case is shared internally with Dr. Nelson, who concurs.   [facility]/pk        Gross Description --     1. The specimen is received in formalin labeled with the patient's name and further designated 'Large Intestine, Transverse Colon biopsy' are multiple small fragments of gray-tan tissue. The specimen is submitted for embedding as received.         Microscopic Description --     Performed, incorporated in diagnosis.          Data Review:  Results from last 7 days   Lab Units 05/16/19  0611 05/14/19  0445  05/12/19  2305   SODIUM mmol/L 142 141  --   --    POTASSIUM mmol/L 3.0* 3.7  --  4.8   CHLORIDE mmol/L 106 106  --   --    CO2 mmol/L 26.5 28.6  --   --    BUN mg/dL 4* 4*  --   --    CREATININE mg/dL 0.46* 0.54*  --   --    GLUCOSE mg/dL 116* 105*   < >  --    CALCIUM mg/dL 7.6* 7.7*  --   --      < > = values in this interval not displayed.     Results from last 7 days   Lab Units 05/16/19  0611 05/15/19  0619 05/14/19  1151   WBC 10*3/mm3 7.23 13.37* 9.03   HEMOGLOBIN g/dL 9.8* 9.8* 9.2*   HEMATOCRIT % 31.3* 30.9* 30.0*   PLATELETS 10*3/mm3 256 247 252             Lab Results   Lab Value Date/Time    TROPONINT 0.015 05/15/2019 0619    TROPONINT 0.031 (C) 05/14/2019 2238    TROPONINT 0.042 (C) 05/14/2019 1835    TROPONINT <0.010 05/10/2019 1046               Invalid input(s): PROT, LABALBU          No results found for: POCGLU  Results from last 7 days   Lab Units 05/14/19  1151   INR  1.29*       Past Medical History:   Diagnosis Date   • Basal cell carcinoma 2013, 2014   • Bowel obstruction (CMS/HCC)    • History of nephrolithiasis    • History of small bowel obstruction    • Hypercalcemia    • Hyperparathyroidism (CMS/HCC)    • Kidney stones    • Kidney stones    • Osteopenia    • Thyroid disease        Assessment:  Active Hospital Problems    Diagnosis  POA   • **Other pulmonary embolism with acute cor pulmonale (CMS/HCC) [I26.09]  No   • Acute respiratory failure with hypoxia (CMS/HCC) [J96.01]  Unknown   • Thrombophilia (CMS/HCC) [D68.59]  No   • 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 Off heparin drip and is wanting comfort care.  She would take pills and will continue Eliquis.  ??? [place] ??? Home with hospice  ?? 1-2 days??. Try diet    Colt Aaron MD  5/19/2019  11:18 AM

## 2019-05-19 NOTE — PLAN OF CARE
Problem: Patient Care Overview  Goal: Plan of Care Review  Outcome: Ongoing (interventions implemented as appropriate)   05/19/19 0606   Coping/Psychosocial   Plan of Care Reviewed With patient   OTHER   Outcome Summary Uneventful shift-pt has rested without complaints-reports passing some flatus but still experiencing some abdominal fullness. Refusing gabapentin. Discussed plan to return home with [facility] and reassured her they would not interfere with her life but improve its quality and address her needs as they arise-she voiced understanding. Will continue to monitor and follow current POC.   Plan of Care Review   Progress improving       Problem: Constipation (Adult)  Goal: Effective Bowel Elimination  Outcome: Ongoing (interventions implemented as appropriate)      Problem: Pain, Acute (Adult)  Goal: Acceptable Pain Control/Comfort Level  Outcome: Ongoing (interventions implemented as appropriate)      Problem: Nutrition, Imbalanced: Inadequate Oral Intake (Adult)  Goal: Improved Oral Intake  Outcome: Ongoing (interventions implemented as appropriate)    Goal: Prevent Further Weight Loss  Outcome: Ongoing (interventions implemented as appropriate)      Problem: Fall Risk (Adult)  Goal: Absence of Fall  Outcome: Ongoing (interventions implemented as appropriate)      Problem: Skin Injury Risk (Adult)  Goal: Skin Health and Integrity  Outcome: Ongoing (interventions implemented as appropriate)

## 2019-05-19 NOTE — PROGRESS NOTES
Chief Complaint:    S/P Transverse colon resection, POD 7    Subjective:    The patient is feeling well but being very careful about her diet.  She is passing flatus and having some liquid bowel movements.    Objective:    Temp:  [97.1 °F (36.2 °C)-98.4 °F (36.9 °C)] 98.4 °F (36.9 °C)  Heart Rate:  [65-95] 65  Resp:  [16] 16  BP: (108-128)/(65-71) 108/65    Physical Exam   Constitutional: She is cooperative. She does not appear ill. No distress.   Abdominal: Soft. There is no tenderness.   Incision: Intact with no evidence of infection.   Neurological: She is alert.       Results:    There are no new labs.    Impression/Plan:    The patient is POD 7 from a transverse colon resection for metastatic colon cancer.  She is recovering well but her bowel function has not fully returned.  She will be continued on the regular diet.    She is scheduled to meet with hospice today.    She will probably be ready for discharge home tomorrow.    Lauri Goldman Jr., M.D.

## 2019-05-19 NOTE — PLAN OF CARE
Problem: Patient Care Overview  Goal: Plan of Care Review  Outcome: Ongoing (interventions implemented as appropriate)   05/19/19 1230   Coping/Psychosocial   Plan of Care Reviewed With patient   OTHER   Outcome Summary Pt ambulated in [place] a couple times today and tolerated well, O2 sats drop in the 80's when walking and off oxygen, Pt. C/O abdominal discomfort and asked for tylenol/given x1, Plan for DC tomorrow with home [facility], consents signed today. Continue current plan of care at this time.   Plan of Care Review   Progress no change       Problem: Constipation (Adult)  Goal: Effective Bowel Elimination  Outcome: Ongoing (interventions implemented as appropriate)    Goal: Comfort  Outcome: Ongoing (interventions implemented as appropriate)      Problem: Pain, Acute (Adult)  Goal: Acceptable Pain Control/Comfort Level  Outcome: Ongoing (interventions implemented as appropriate)      Problem: Fall Risk (Adult)  Goal: Absence of Fall  Outcome: Ongoing (interventions implemented as appropriate)      Problem: Skin Injury Risk (Adult)  Goal: Skin Health and Integrity  Outcome: Ongoing (interventions implemented as appropriate)

## 2019-05-19 NOTE — PLAN OF CARE
Problem: Patient Care Overview  Goal: Plan of Care Review  Outcome: Ongoing (interventions implemented as appropriate)   05/18/19 2001   Coping/Psychosocial   Plan of Care Reviewed With patient   OTHER   Outcome Summary No complaints of nausea or pain. Pt up with assist x1 and walked three times in hallway without oxygen, tolerated well. Refusing labs. Will meet with [facility] in AM, plan to go home with [facility] is approved. Continue current plan of care.    Plan of Care Review   Progress improving       Problem: Constipation (Adult)  Goal: Effective Bowel Elimination  Outcome: Ongoing (interventions implemented as appropriate)    Goal: Comfort  Outcome: Ongoing (interventions implemented as appropriate)      Problem: Pain, Acute (Adult)  Goal: Acceptable Pain Control/Comfort Level  Outcome: Ongoing (interventions implemented as appropriate)      Problem: Nutrition, Imbalanced: Inadequate Oral Intake (Adult)  Goal: Improved Oral Intake  Outcome: Ongoing (interventions implemented as appropriate)    Goal: Prevent Further Weight Loss  Outcome: Ongoing (interventions implemented as appropriate)      Problem: Fall Risk (Adult)  Goal: Absence of Fall  Outcome: Ongoing (interventions implemented as appropriate)      Problem: Skin Injury Risk (Adult)  Goal: Skin Health and Integrity  Outcome: Ongoing (interventions implemented as appropriate)

## 2019-05-19 NOTE — CONSULTS
Met with patient this morning, and the plan is home with our services tomorrow. She is not requesting and DME at this time unless she still needs oxygen. The goal was for her to trial periods of the oxygen today, and then let us know tomorrow if she needs it at discharge or not. Thank you again for the referral and for allowing me to participate in the care of this sweet lady.    Prudence Terrazas RN  [facility]  [phone]

## 2019-05-20 VITALS
BODY MASS INDEX: 20.16 KG/M2 | HEART RATE: 64 BPM | WEIGHT: 109.57 LBS | RESPIRATION RATE: 16 BRPM | TEMPERATURE: 97.8 F | HEIGHT: 62 IN | OXYGEN SATURATION: 98 % | SYSTOLIC BLOOD PRESSURE: 111 MMHG | DIASTOLIC BLOOD PRESSURE: 60 MMHG

## 2019-05-20 PROCEDURE — 99024 POSTOP FOLLOW-UP VISIT: CPT | Performed by: SURGERY

## 2019-05-20 RX ORDER — GABAPENTIN 100 MG/1
100 CAPSULE ORAL EVERY 8 HOURS SCHEDULED
Qty: 60 CAPSULE | Refills: 0 | Status: SHIPPED | OUTPATIENT
Start: 2019-05-20 | End: 2019-05-20

## 2019-05-20 RX ORDER — HYDROCODONE BITARTRATE AND ACETAMINOPHEN 7.5; 325 MG/1; MG/1
1 TABLET ORAL EVERY 6 HOURS PRN
Qty: 20 TABLET | Refills: 0 | Status: SHIPPED | OUTPATIENT
Start: 2019-05-20 | End: 2019-05-20 | Stop reason: HOSPADM

## 2019-05-20 RX ORDER — HYDROCODONE BITARTRATE AND ACETAMINOPHEN 7.5; 325 MG/1; MG/1
1 TABLET ORAL EVERY 6 HOURS PRN
Qty: 20 TABLET | Refills: 0 | Status: SHIPPED | OUTPATIENT
Start: 2019-05-20 | End: 2019-05-20

## 2019-05-20 RX ORDER — GABAPENTIN 100 MG/1
100 CAPSULE ORAL EVERY 8 HOURS SCHEDULED
Qty: 60 CAPSULE | Refills: 0 | Status: SHIPPED | OUTPATIENT
Start: 2019-05-20 | End: 2019-05-20 | Stop reason: HOSPADM

## 2019-05-20 RX ORDER — PROMETHAZINE HYDROCHLORIDE 12.5 MG/1
12.5 TABLET ORAL EVERY 6 HOURS PRN
Qty: 20 TABLET | Refills: 0 | Status: SHIPPED | OUTPATIENT
Start: 2019-05-20

## 2019-05-20 RX ADMIN — GABAPENTIN 100 MG: 100 CAPSULE ORAL at 06:23

## 2019-05-20 RX ADMIN — ACETAMINOPHEN 500 MG: 500 TABLET, FILM COATED ORAL at 06:23

## 2019-05-20 RX ADMIN — APIXABAN 5 MG: 5 TABLET, FILM COATED ORAL at 08:26

## 2019-05-20 RX ADMIN — SODIUM CHLORIDE, PRESERVATIVE FREE 3 ML: 5 INJECTION INTRAVENOUS at 08:28

## 2019-05-20 RX ADMIN — MULTIPLE VITAMINS W/ MINERALS TAB 1 TABLET: TAB at 08:26

## 2019-05-20 RX ADMIN — Medication 1 CAPSULE: at 08:26

## 2019-05-20 NOTE — PLAN OF CARE
Problem: Patient Care Overview  Goal: Plan of Care Review  Outcome: Ongoing (interventions implemented as appropriate)   05/20/19 0413   Coping/Psychosocial   Plan of Care Reviewed With patient   OTHER   Outcome Summary Patient was given Tylenol for abdominal gas pain per her request. Patient slept between care. Maintained comfort measures per palliatve care protocol. Plan is for discharge today with home [facility]. Will continue to monitor vital signs and comfort.   Plan of Care Review   Progress no change     Goal: Individualization and Mutuality  Outcome: Ongoing (interventions implemented as appropriate)    Goal: Discharge Needs Assessment  Outcome: Ongoing (interventions implemented as appropriate)    Goal: Interprofessional Rounds/Family Conf  Outcome: Ongoing (interventions implemented as appropriate)      Problem: Constipation (Adult)  Goal: Effective Bowel Elimination  Outcome: Ongoing (interventions implemented as appropriate)    Goal: Comfort  Outcome: Ongoing (interventions implemented as appropriate)      Problem: Pain, Acute (Adult)  Goal: Acceptable Pain Control/Comfort Level  Outcome: Ongoing (interventions implemented as appropriate)      Problem: Nutrition, Imbalanced: Inadequate Oral Intake (Adult)  Goal: Improved Oral Intake  Outcome: Ongoing (interventions implemented as appropriate)    Goal: Prevent Further Weight Loss  Outcome: Ongoing (interventions implemented as appropriate)      Problem: Fall Risk (Adult)  Goal: Absence of Fall  Outcome: Ongoing (interventions implemented as appropriate)      Problem: Skin Injury Risk (Adult)  Goal: Skin Health and Integrity  Outcome: Ongoing (interventions implemented as appropriate)

## 2019-05-20 NOTE — DISCHARGE SUMMARY
PHYSICIAN DISCHARGE SUMMARY                                                                        [facility]    Patient Identification:  Name: Rosa Salazar  Age: 78 y.o.  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.4]  Yes   • Liver metastases (CMS/HCC) [C78.7]  Yes   • Abdominal pain [R10.9]  Yes   • Malnutrition (CMS/HCC) [E46]  Unknown   • Kidney stone [N20.0]  Yes      Resolved Hospital Problems    Diagnosis Date Resolved POA   • Hypokalemia [E87.6] 2019 Unknown          PMHX:   Past Medical History:   Diagnosis Date   • Basal cell carcinoma ,    • Bowel obstruction (CMS/HCC)    • History of nephrolithiasis    • History of small bowel obstruction    • Hypercalcemia    • Hyperparathyroidism (CMS/HCC)    • Kidney stones    • Kidney stones    • Osteopenia    • Thyroid disease      PSHX:   Past Surgical History:   Procedure Laterality Date   • APPENDECTOMY     • BLADDER REPAIR     • COLON SURGERY      PERFORATED COLON (2 SPOTS OF COLON)   • COLON SURGERY      OBSTRUCTION   • COLONOSCOPY  2002    colon perforation  [place]   • COLONOSCOPY N/A 2019    Procedure: COLONOSCOPY to transverse colon with biopsy, tattoo;  Surgeon: Marcos Bethea MD;  Location: [facility] ENDOSCOPY;  Service: Gastroenterology   • EXPLORATORY LAPAROTOMY N/A 2019    Procedure: Transverse colon resection;  Surgeon: Sandro Barajas MD;  Location: [facility] OR;  Service:  General   • HERNIA REPAIR  2009    x2   • INTESTINAL MALROTATION REPAIR  2001    TWISTED INTESTINE    • KIDNEY STONE SURGERY  2004   • OOPHORECTOMY     • SKIN CANCER EXCISION  03/24/2014    Excision basal cell carcinoma, right forehead (0.8cm) with frozen section control and layered wound closure (2.2cm)-Dr. Stuart Bajwa   • SKIN CANCER EXCISION  11/19/2013    Excision basal cell carcinoma, biopsy site upper mid forehead (0.6cm) with frozen section control and layered wound closures (1.2cm)-Dr. Stuart Huang   • THYROIDECTOMY Right 9/8/2016    Procedure: RIGHT SUPERIOR PARATHYROID ADENOMA RESECTION, RIGHT INFERIOR PARATHYROID BX;  Surgeon: Xin Melissa MD;  Location: [facility] OR [facility];  Service:    • TOTAL ABDOMINAL HYSTERECTOMY  1978    X2 SURGERIES        Hospital Course: Rosa Salazar   is a 78 y.o. female with a history of hyperparathyroidism, basal cell carcinoma, osteopenia and recurrent small bowel obstruction s/p multiple abdominal sugeries. She was seen by Dr. Palma recently due to complaints of constipation and difficulty having stools. She was found to have mutliple liver lesions suggestive of metastatic disease noted on CT of the abdomen and is scheduled to have a colonoscopy.      Patient presented with complaints of generalized abdominal pain that started about a month ago and was associated with diarrhea, decreased appetite, nausea and increased flatulence. She was encouraged to come the the ER for further evaluation given recent abnormal CT and poor oral intake. Patient reports she has only eaten a banana in the past week, but does drink Gatorade and other fluids.reports she only had a bowel movement when she passes gas and it a small about of liquid. Denies a normal bowel movement in the past month despite taking laxatives. Denies any fever, chills, sick contacts, chest pain, shortness of breath, bone pain or dysuria. Denies any tobacco use, alcohol use or family history of cancer.        The patient was  admitted to hospital and had colon resection for colon cancer and postoperatively she developed a DVT and was treated with some anticoagulation.  She was also seen by oncology and decided she really did not want aggressive treatment.  She has bulky liver metastases and decided to go with hospice and go home with hospice care.  She will follow-up with surgery in a week for staple removal and follow-up with her primary care doctor and will have hospice care at home.  She will continue with Eliquis at home and her other medicines if she wishes to continue.    Consults:     Consults     Date and Time Order Name Status Description    5/14/2019 1712 Inpatient Pulmonology Consult      5/14/2019 1154 Inpatient Cardiology Consult Completed     5/11/2019 1524 Inpatient General Surgery Consult Completed     5/10/2019 2058 Inpatient Palliative Care MD Consult Completed     5/10/2019 1556 Inpatient Urology Consult Completed     5/10/2019 1457 Hematology & Oncology Inpatient Consult Completed     5/10/2019 1457 Inpatient Gastroenterology Consult Completed     5/10/2019 1247 [facility] (on-call MD unless specified) Completed     5/10/2019 1108 Gastroenterology (on-call MD unless specified) Completed         Results from last 7 days   Lab Units 05/16/19  0611   WBC 10*3/mm3 7.23   HEMOGLOBIN g/dL 9.8*   HEMATOCRIT % 31.3*   PLATELETS 10*3/mm3 256     Results from last 7 days   Lab Units 05/16/19  0611   SODIUM mmol/L 142   POTASSIUM mmol/L 3.0*   CHLORIDE mmol/L 106   CO2 mmol/L 26.5   BUN mg/dL 4*   CREATININE mg/dL 0.46*   GLUCOSE mg/dL 116*   CALCIUM mg/dL 7.6*     Significant Diagnostic Studies: No results found for: WBC, HGB, HCT, PLT  No results found for: NA, K, CL, CO2, BUN, CREATININE, GLUCOSE  No results found for: CALCIUM, MG, PHOS  No results found for: AST, ALT, ALKPHOS  No results found for: APTT, INR  No results found for: COLORU, CLARITYU, SPECGRAV, PHUR, PROTEINUR, GLUCOSEU, KETONESU, BLOODU, NITRITE, LEUKOCYTESUR,  BILIRUBINUR, UROBILINOGEN, RBCUA, WBCUA, BACTERIA, UACOMMENT  No results found for: TROPONINT, TROPONINI, BNP  No components found for: HGBA1C;2  No components found for: TSH;2  Imaging Results (all)     Procedure Component Value Units Date/Time    CT Angiogram Chest With & Without Contrast [[id number]] Collected:  05/14/19 1151     Updated:  05/14/19 1701    Narrative:       CT ANGIOGRAM OF THE CHEST. MULTIPLE CORONAL, SAGITTAL, AND 3-D  RECONSTRUCTIONS     HISTORY: 78-year-old female with hypoxia. Recently diagnosed with  metastatic colon cancer.     TECHNIQUE: Radiation dose reduction techniques were utilized, including  automated exposure control and exposure modulation based on body size.   CT angiogram of the chest was performed following the administration of  IV contrast. Multiple coronal, sagittal, and 3-D reconstruction images  were obtained. There is no previous chest CT for comparison. Correlated  with recent CTs of the abdomen.     FINDINGS: There are multiple bilateral pulmonary thromboemboli within  segmental pulmonary arteries. The main pulmonary arteries are patent,  but there are pulmonary thromboemboli within left upper lobe, left lower  lobe, right upper lobe, right middle lobe, and right lower lobe  pulmonary arteries and branches. The RV to LV ratio is 1.3. Since the CT  of the abdomen from 05/10/2019, there has been development of  atelectatic change at both lower lobes, more prominent on the right and  small bilateral pleural effusions. There is a new tiny pericardial  effusion. There is no lymphadenopathy within the chest.       Impression:       1. Multiple bilateral pulmonary thromboemboli within segmental pulmonary  arteries and branches. The RV to LV ratio is 1.3.  2. New atelectatic change at both lower lobes and small bilateral  pleural effusions. There is also a new tiny pericardial effusion.     Discussed with Dr. Michaels.     This report was finalized on 5/14/2019 4:57 PM by Dr. Strickland  TANVI Villagran       CT Abdomen Pelvis Without Contrast [[id number]] Collected:  05/10/19 1327     Updated:  05/10/19 1358    Narrative:       CT SCAN OF THE ABDOMEN AND PELVIS WITHOUT CONTRAST     HISTORY: Probable liver metastases noted on recent CT scan. Large stone  in right renal pelvis with obstruction. Abdominal pain and constipation.     The CT scan was performed as an emergency procedure through the abdomen  and pelvis without contrast and compared to the recent enhanced CT scans  of the abdomen and pelvis performed 4 days ago on 05/06/2019. The  following findings are present:  1. Again noted are multiple low-density lesions scattered throughout the  liver consistent with extensive metastatic disease and these measure up  to 2.6 cm in size. One of these could be biopsied under CT guidance for  diagnosis if necessary.  2. The lung bases are clear. The spleen, pancreas, both adrenal glands  are unremarkable. There are multiple small gallstones within the  gallbladder.  3. There is a large ovoid stone in the right renal pelvis measuring up  to 13 mm and causing mild-to-moderate right renal obstruction that is  unchanged from 4 days ago. There are several smaller nonobstructing  stones scattered in the right kidney. The left kidney is unremarkable  except for a small cortical cyst.  4. There is no aortic aneurysm or retroperitoneal lymphadenopathy. The  large and small bowel loops are normal in caliber and show no  inflammatory change. No abnormality is seen in the pelvis.  5. At bone windows, no suspicious bone lesions are seen.           Radiation dose reduction techniques were utilized, including automated  exposure control and exposure modulation based on body size.     This report was finalized on 5/10/2019 1:55 PM by Dr. Shimon Canas M.D.       XR Chest 2 View [[id number]] Collected:  05/10/19 1216     Updated:  05/10/19 1228    Narrative:       PA AND LATERAL CHEST     HISTORY: Abdominal distention and  bloating for 5 to 6 weeks.     COMPARISON: None.     FINDINGS: The cardiomediastinal silhouette is within normal limits.  Lungs appear clear and there is no evidence for pulmonary edema or  pleural effusion or infiltrate. Surgical clips overlie the right lower  neck/thyroid. There is a minor scoliotic curvature of the thoracic  spine.       Impression:       No evidence for active disease in the chest.     This report was finalized on 5/10/2019 12:25 PM by Dr. Raj Slater M.D.           Lab Results (last 7 days)     Procedure Component Value Units Date/Time    Tissue Pathology Exam [[id number]] Collected:  05/12/19 1016    Specimen:  Tissue from Large Intestine, Transverse Colon Updated:  05/17/19 1231     Addendum 2 --     Please see completely scanned MSI IHC report from [facility] below.       Addendum --     A request for KRAS, MSI was received on 5/15/19 for patient Rosa Salazar from Dr. Wasserman.  The test is to be performed on tissue from case [id number].  The case report, slides, and blocks for the cited accession were retrieved from archives.  The pathologist whose signature appears below reviewed the original pathology report, examined candidate H&E slides, and selected the block 1E and appropriate to the specifications of the ordered molecular analysis.   An addendum report will be issued when the results of this molecular test are available.    CPT Code: 92078         Case Report --     Surgical Pathology Report                         Case: [id number]                                  Authorizing Provider:  Sandro Barajas MD      Collected:           05/12/2019 10:16 AM          Ordering Location:     [facility]  Received:            05/13/2019 05:47 AM                                 MAIN OR                                                                      Pathologist:           Lenora Moran MD                                                    Specimen:    Large Intestine,  Transverse Colon                                                           Final Diagnosis --     1. Transverse Colon, Segmental  Resection:  INVASIVE MODERATELY DIFFERENTIATED COLONIC       ADENOCARCINOMA.   A. Tumor site:  Transverse Colon.   B. Tumor size:  5.5 x 5.5 x 1.2 cm.   C. Macroscopic tumor perforation:  Not identified.   D. Tumor extension:  Tumor invades through the muscularis into pericolonic adipose tissue.   E. Margins:  Uninvolved by invasive carcinoma.    1. Tumor comes to within 4.0 cm of the mesenteric margin and 4.0 cm of one of the parenchymal        resection margins (closest margins).   F. Lymphovascular invasion:  Present.   G. Perineural invasion:  Not identified.   H. Tumor deposits:  Not identified.   I.  Additional findings:  One resection margin with focus of mild ischemic change.   J. Lymph Nodes:  Eight of twenty-seven lymph nodes, positive for metastatic carcinoma (8/27).    1. Largest metastatic focus measures 1.0 cm, with extracapsular extension.   K. Pathologic stage:  pT3, N2b.    Swm/kds       Synoptic Checklist --     COLON AND RECTUM: Resection, Including Transanal Disk Excision of Rectal Neoplasms  (Colon Res - All Specimens)      SPECIMEN     Procedure:    Transverse colectomy     TUMOR     Tumor Site:    Transverse colon      Histologic Type:    Adenocarcinoma      Histologic Grade:    G2: Moderately differentiated      Tumor Size:    Greatest dimension in Centimeters (cm): 5.5 Centimeters (cm)     Tumor Deposits:    Not identified      Tumor Extent:           Tumor Extension:    Tumor invades through the muscularis propria into pericolorectal tissue        Macroscopic Tumor Perforation:    Not identified      Accessory Findings:           Lymphovascular Invasion:    Present          :    Small vessel lymphovascular invasion        Perineural Invasion:    Not identified        Tumor Budding:    Number of tumor buds in one ‘hotspot’ field (total number in area = 0.785 mm2)  "         :    Low score (0-4)        Type of Polyp in Which Invasive Carcinoma Arose:    None identified        Treatment Effect:    No known presurgical therapy     MARGINS     Margins:    All margins are uninvolved by invasive carcinoma, high-grade dysplasia, intramucosal adenocarcinoma, and adenoma        Margins Examined:    Proximal        Margins Examined:    Distal        Margins Examined:    Radial or Mesenteric        Distance of Invasive Carcinoma from Closest Margin:    4.0 Centimeters (cm)       Closest Margin:    Radial or Mesenteric        Distance of Tumor from Distal Margin:    Cannot be determined: specimen unoriented     LYMPH NODES     Number of Lymph Nodes Involved:    8      Number of Lymph Nodes Examined:    27     PATHOLOGIC STAGE CLASSIFICATION (pTNM, AJCC 8th Edition)     Primary Tumor (pT):    pT3      Regional Lymph Nodes (pN):    pN2b        Gross Description --     1. Received in formalin labeled \"transverse colon\" is a 13 cm segment of transverse colon which is stapled at both ends and cannot be oriented.  There is tattoo ink at one end.  The remaining serosal surface is smooth tan-pink and there is minimal amount of adherent adipose tissue.  The serosal surface is focally umbilicated at the mid point of the segment.  The umbilicated serosa lies 3 cm from the tattoo ink.  The umbilicated serosa is inked blue and the colon is open to reveal a 90% circumferential 5.0 x 5.0 x 1.0 cm sessile centrally ulcerative mass which comes to within 4.2 cm of the tattooed resection margin and 4 cm of the opposite resection margin.  The mass extends to but does not grossly penetrate the overlying umbilicated serosal surface.  The mass also comes to within 4 cm of the mesenteric fat margin (inked black) and has a maximum depth of 1.2 cm, invading through the muscularis propria into the pericolic fat.  Following dissection of the pericolic fat reveals multiple probable lymph nodes ranging from 0.1 cm to " 2.0 x 1.5 x 1.3 cm. The largest lymph node is sectioned to reveal a grossly positive lymph node conglomerate of at least two lymph nodes.  Representative sections are submitted as follows:      1A - tattooed staple line margin en face.  1B - opposite staple line margin en face.  1C-1D - mass invading underlying adipose tissue.  1E - mass towards mucosa in the direction of the tattoo ink.  1F - mass towards mucosa in the direction of the opposite resection margin.  1G-1H - mass to umbilicated serosal surface.  1I  - mesenteric fat margin perpendicular.  1J - six lymph nodes en toto.  1K - six lymph nodes en toto.  1L - four lymph nodes en toto.  1M - two lymph nodes en toto.  1N - two grossly positive lymph nodes, one representative section of each.  1O - largest grossly positive lymph node conglomerate (at least two lymph nodes total) - one representative section.    Jap/uso/swm/kds       Microscopic Description --     Performed, incorporated in diagnosis.      Basic Metabolic Panel [[id number]]  (Abnormal) Collected:  05/16/19 0611    Specimen:  Blood Updated:  05/16/19 0712     Glucose 116 mg/dL      BUN 4 mg/dL      Creatinine 0.46 mg/dL      Sodium 142 mmol/L      Potassium 3.0 mmol/L      Chloride 106 mmol/L      CO2 26.5 mmol/L      Calcium 7.6 mg/dL      eGFR Non African Amer 132 mL/min/1.73      BUN/Creatinine Ratio 8.7     Anion Gap 9.5 mmol/L     Narrative:       GFR Normal >60  Chronic Kidney Disease <60  Kidney Failure <15    aPTT [[id number]]  (Abnormal) Collected:  05/16/19 0611    Specimen:  Blood Updated:  05/16/19 0657     PTT 74.8 seconds     CBC & Differential [[id number]] Collected:  05/16/19 0611    Specimen:  Blood Updated:  05/16/19 0647    Narrative:       The following orders were created for panel order CBC & Differential.  Procedure                               Abnormality         Status                     ---------                               -----------         ------                      CBC Auto Differential[[id number]]        Abnormal            Final result                 Please view results for these tests on the individual orders.    CBC Auto Differential [[id number]]  (Abnormal) Collected:  05/16/19 0611    Specimen:  Blood Updated:  05/16/19 0647     WBC 7.23 10*3/mm3      RBC 3.43 10*6/mm3      Hemoglobin 9.8 g/dL      Hematocrit 31.3 %      MCV 91.3 fL      MCH 28.6 pg      MCHC 31.3 g/dL      RDW 12.9 %      RDW-SD 43.2 fl      MPV 9.9 fL      Platelets 256 10*3/mm3      Neutrophil % 78.2 %      Lymphocyte % 8.4 %      Monocyte % 5.8 %      Eosinophil % 5.8 %      Basophil % 0.3 %      Immature Grans % 1.5 %      Neutrophils, Absolute 5.65 10*3/mm3      Lymphocytes, Absolute 0.61 10*3/mm3      Monocytes, Absolute 0.42 10*3/mm3      Eosinophils, Absolute 0.42 10*3/mm3      Basophils, Absolute 0.02 10*3/mm3      Immature Grans, Absolute 0.11 10*3/mm3      nRBC 0.0 /100 WBC     aPTT [[id number]]  (Abnormal) Collected:  05/15/19 1802    Specimen:  Blood Updated:  05/15/19 2129     .4 seconds     Ferritin [[id number]]  (Abnormal) Collected:  05/15/19 0619    Specimen:  Blood Updated:  05/15/19 0951     Ferritin 298.00 ng/mL     Iron Profile [[id number]]  (Abnormal) Collected:  05/15/19 0619    Specimen:  Blood Updated:  05/15/19 0945     Iron 188 mcg/dL      Iron Saturation 96 %      Transferrin 131 mg/dL      TIBC 195 mcg/dL     Troponin [[id number]]  (Normal) Collected:  05/15/19 0619    Specimen:  Blood Updated:  05/15/19 0726     Troponin T 0.015 ng/mL     Narrative:       Troponin T Reference Range:  <= 0.03 ng/mL-   Negative for AMI  >0.03 ng/mL-     Abnormal for myocardial necrosis.  Clinicians would have to utilize clinical acumen, EKG, Troponin and serial changes to determine if it is an Acute Myocardial Infarction or myocardial injury due to an underlying chronic condition.     aPTT [[id number]]  (Abnormal) Collected:  05/15/19 0619    Specimen:  Blood Updated:  05/15/19 0720      .9 seconds     CBC & Differential [[id number]] Collected:  05/15/19 0619    Specimen:  Blood Updated:  05/15/19 0709    Narrative:       The following orders were created for panel order CBC & Differential.  Procedure                               Abnormality         Status                     ---------                               -----------         ------                     CBC Auto Differential[[id number]]        Abnormal            Final result                 Please view results for these tests on the individual orders.    CBC Auto Differential [[id number]]  (Abnormal) Collected:  05/15/19 0619    Specimen:  Blood Updated:  05/15/19 0709     WBC 13.37 10*3/mm3      RBC 3.41 10*6/mm3      Hemoglobin 9.8 g/dL      Hematocrit 30.9 %      MCV 90.6 fL      MCH 28.7 pg      MCHC 31.7 g/dL      RDW 12.6 %      RDW-SD 41.9 fl      MPV 10.2 fL      Platelets 247 10*3/mm3      Neutrophil % 93.0 %      Lymphocyte % 1.9 %      Monocyte % 2.8 %      Eosinophil % 1.3 %      Basophil % 0.3 %      Immature Grans % 0.7 %      Neutrophils, Absolute 12.43 10*3/mm3      Lymphocytes, Absolute 0.25 10*3/mm3      Monocytes, Absolute 0.38 10*3/mm3      Eosinophils, Absolute 0.17 10*3/mm3      Basophils, Absolute 0.04 10*3/mm3      Immature Grans, Absolute 0.10 10*3/mm3      nRBC 0.0 /100 WBC     Troponin [[id number]]  (Abnormal) Collected:  05/14/19 2238    Specimen:  Blood from Hand, Left Updated:  05/14/19 2342     Troponin T 0.031 ng/mL     Narrative:       Troponin T Reference Range:  <= 0.03 ng/mL-   Negative for AMI  >0.03 ng/mL-     Abnormal for myocardial necrosis.  Clinicians would have to utilize clinical acumen, EKG, Troponin and serial changes to determine if it is an Acute Myocardial Infarction or myocardial injury due to an underlying chronic condition.     aPTT [[id number]]  (Abnormal) Collected:  05/14/19 1835    Specimen:  Blood Updated:  05/14/19 1918     PTT 92.1 seconds     Troponin [[id number]]  (Abnormal)  Collected:  05/14/19 1835    Specimen:  Blood Updated:  05/14/19 1916     Troponin T 0.042 ng/mL     Narrative:       Troponin T Reference Range:  <= 0.03 ng/mL-   Negative for AMI  >0.03 ng/mL-     Abnormal for myocardial necrosis.  Clinicians would have to utilize clinical acumen, EKG, Troponin and serial changes to determine if it is an Acute Myocardial Infarction or myocardial injury due to an underlying chronic condition.     BNP [[id number]]  (Abnormal) Collected:  05/14/19 1835    Specimen:  Blood Updated:  05/14/19 1910     proBNP 2,007.0 pg/mL     Narrative:       Among patients with dyspnea, NT-proBNP is highly sensitive for the detection of acute congestive heart failure. In addition NT-proBNP of <300 pg/ml effectively rules out acute congestive heart failure with 99% negative predictive value.    Protime-INR [[id number]]  (Abnormal) Collected:  05/14/19 1151    Specimen:  Blood Updated:  05/14/19 1240     Protime 15.8 Seconds      INR 1.29    aPTT [[id number]]  (Normal) Collected:  05/14/19 1151    Specimen:  Blood Updated:  05/14/19 1240     PTT 28.3 seconds     CBC & Differential [[id number]] Collected:  05/14/19 1151    Specimen:  Blood Updated:  05/14/19 1221    Narrative:       The following orders were created for panel order CBC & Differential.  Procedure                               Abnormality         Status                     ---------                               -----------         ------                     CBC Auto Differential[[id number]]        Abnormal            Final result                 Please view results for these tests on the individual orders.    CBC Auto Differential [[id number]]  (Abnormal) Collected:  05/14/19 1151    Specimen:  Blood Updated:  05/14/19 1221     WBC 9.03 10*3/mm3      RBC 3.23 10*6/mm3      Hemoglobin 9.2 g/dL      Hematocrit 30.0 %      MCV 92.9 fL      MCH 28.5 pg      MCHC 30.7 g/dL      RDW 12.7 %      RDW-SD 43.1 fl      MPV 9.6 fL      Platelets 252  10*3/mm3      Neutrophil % 86.2 %      Lymphocyte % 5.5 %      Monocyte % 6.2 %      Eosinophil % 0.9 %      Basophil % 0.3 %      Immature Grans % 0.9 %      Neutrophils, Absolute 7.78 10*3/mm3      Lymphocytes, Absolute 0.50 10*3/mm3      Monocytes, Absolute 0.56 10*3/mm3      Eosinophils, Absolute 0.08 10*3/mm3      Basophils, Absolute 0.03 10*3/mm3      Immature Grans, Absolute 0.08 10*3/mm3      nRBC 0.0 /100 WBC     Blood Gas, Arterial [[id number]]  (Abnormal) Collected:  05/14/19 1101    Specimen:  Arterial Blood Updated:  05/14/19 1104     Site Arterial: left brachial     Francis's Test N/A     pH, Arterial 7.428 pH units      pCO2, Arterial 36.3 mm Hg      pO2, Arterial 63.7 mm Hg      HCO3, Arterial 24.0 mmol/L      Base Excess, Arterial -0.2 mmol/L      O2 Saturation Calculated 92.7 %      Barometric Pressure for Blood Gas 756.0 mmHg      Modality NRB     Flow Rate 15 lpm      Set [facility] Resp Rate 24    POC Glucose Once [[id number]]  (Normal) Collected:  05/14/19 1032    Specimen:  Blood Updated:  05/14/19 1039     Glucose 128 mg/dL     CBC & Differential [[id number]] Collected:  05/14/19 0445    Specimen:  Blood Updated:  05/14/19 0717    Narrative:       The following orders were created for panel order CBC & Differential.  Procedure                               Abnormality         Status                     ---------                               -----------         ------                     CBC Auto Differential[[id number]]        Abnormal            Final result                 Please view results for these tests on the individual orders.    CBC Auto Differential [[id number]]  (Abnormal) Collected:  05/14/19 0445    Specimen:  Blood Updated:  05/14/19 0717     WBC 8.06 10*3/mm3      RBC 3.39 10*6/mm3      Hemoglobin 9.7 g/dL      Hematocrit 31.0 %      MCV 91.4 fL      MCH 28.6 pg      MCHC 31.3 g/dL      RDW 12.6 %      RDW-SD 41.9 fl      MPV 10.1 fL      Platelets 292 10*3/mm3      Neutrophil % 83.7 %       Lymphocyte % 6.9 %      Monocyte % 7.1 %      Eosinophil % 1.5 %      Basophil % 0.2 %      Immature Grans % 0.6 %      Neutrophils, Absolute 6.74 10*3/mm3      Lymphocytes, Absolute 0.56 10*3/mm3      Monocytes, Absolute 0.57 10*3/mm3      Eosinophils, Absolute 0.12 10*3/mm3      Basophils, Absolute 0.02 10*3/mm3      Immature Grans, Absolute 0.05 10*3/mm3      nRBC 0.0 /100 WBC     Basic Metabolic Panel [[id number]]  (Abnormal) Collected:  05/14/19 0445    Specimen:  Blood Updated:  05/14/19 0607     Glucose 105 mg/dL      BUN 4 mg/dL      Creatinine 0.54 mg/dL      Sodium 141 mmol/L      Potassium 3.7 mmol/L      Chloride 106 mmol/L      CO2 28.6 mmol/L      Calcium 7.7 mg/dL      eGFR Non African Amer 109 mL/min/1.73      BUN/Creatinine Ratio 7.4     Anion Gap 6.4 mmol/L     Narrative:       GFR Normal >60  Chronic Kidney Disease <60  Kidney Failure <15    Tissue Pathology Exam [[id number]] Collected:  05/11/19 1039    Specimen:  Tissue from Large Intestine, Transverse Colon Updated:  05/13/19 1408     Case Report --     Surgical Pathology Report                         Case: [id number]                                  Authorizing Provider:  Marcos Bethea MD        Collected:           05/11/2019 10:39 AM          Ordering Location:     [facility]  Received:            05/13/2019 06:06 AM                                 ENDO SUITES                                                                  Pathologist:           Lenora Moran MD                                                    Specimen:    Large Intestine, Transverse Colon, mass                                                     Final Diagnosis --     1. Transverse Colon, Biopsy: INVASIVE MODERATELY DIFFERENTIATED COLONIC ADENOCARCINOMA.    swm/ivette        Comment --     This case is shared internally with Dr. Nelson, who concurs.   swm/pkm        Gross Description --     1. The specimen is received in formalin labeled with the  "patient's name and further designated 'Large Intestine, Transverse Colon biopsy' are multiple small fragments of gray-tan tissue. The specimen is submitted for embedding as received.         Microscopic Description --     Performed, incorporated in diagnosis.          /60 (BP Location: Left arm, Patient Position: Lying)   Pulse 64   Temp 97.8 °F (36.6 °C) (Oral)   Resp 16   Ht 157.5 cm (62\")   Wt 49.7 kg (109 lb 9.1 oz)   SpO2 98%   BMI 20.04 kg/m²     Discharge Exam:  General Appearance:    Alert, cooperative, no distress                          Head:    Normocephalic, without obvious abnormality, atraumatic                          Eyes:                            Throat:   Lips, tongue, gums normal                          Neck:   Supple, symmetrical, trachea midline, no JVD                        Lungs:     Clear to auscultation bilaterally, respirations unlabored                Chest Wall:    No tenderness or deformity                        Heart:    Regular rate and rhythm, S1 and S2 normal, no murmur,no  Rub or gallop                  Abdomen:     Soft, non-tender, bowel sounds active, no masses, no organomegaly                  Extremities:   Extremities normal, atraumatic, no cyanosis or edema                             Skin:   Skin is warm and dry,  no rashes or palpable lesions                  Neurologic:   no focal deficits noted     Disposition:  Home    Patient Instructions:      Discharge Medications      New Medications      Instructions Start Date   apixaban 5 MG tablet tablet  Commonly known as:  ELIQUIS   5 mg, Oral, Every 12 Hours Scheduled      gabapentin 100 MG capsule  Commonly known as:  NEURONTIN   100 mg, Oral, Every 8 Hours Scheduled      HYDROcodone-acetaminophen 7.5-325 MG per tablet  Commonly known as:  NORCO   1 tablet, Oral, Every 6 Hours PRN      promethazine 12.5 MG tablet  Commonly known as:  PHENERGAN   12.5 mg, Oral, Every 6 Hours PRN         Continue These " Medications      Instructions Start Date   acetaminophen 500 MG tablet  Commonly known as:  TYLENOL   500 mg, Oral, Every 6 Hours PRN      bisacodyl 5 MG EC tablet  Commonly known as:  DULCOLAX   5 mg, Oral, Daily PRN      calcitriol 0.25 MCG capsule  Commonly known as:  ROCALTROL   Begin one tablet daily of calcitriol, if you develop symptoms of numbness, tingling, or cramping despite use of calcium supplement tablets      METAMUCIL 28.3 % powder  Generic drug:  Psyllium   Oral, Daily, 1 TSP DAILY       multivitamin with minerals tablet tablet   1 tablet, Oral, Daily      PROBIOTIC PO   1 capsule, Oral, Daily, Multidophilus 12, 20 billion, take 1 capsule daily             Future Appointments   Date Time Provider Department Center   6/6/2019  7:50 AM LAB CHAIR 5 CLAUDIA MENJIV[facility]  [facility]   6/6/2019  8:20 AM Shamar Wasserman MD [facility]  [facility]     Follow-up Information     Chelsi Danielle PA .    Specialty:  Physician Assistant  Contact information:  [address] [place] [id number] [phone]                 Discharge Order (From admission, onward)    Start     Ordered    05/20/19 1204  Discharge patient  Once     Expected Discharge Date:  05/20/19    Discharge Disposition:  Home or Self Care    Physician of Record for Attribution - Please select from Treatment Team:  CHENTE AARON [[id number]]    Review needed by CMO to determine Physician of Record:  No       Question Answer Comment   Physician of Record for Attribution - Please select from Treatment Team CHENTE AARON    Review needed by CMO to determine Physician of Record No        05/20/19 1206          Total time spent discharging patient including evaluation,post hospitalization follow up,  medication and post hospitalization instructions and education total time exceeds 30 minutes.    Signed:  Chente Aaron MD  5/20/2019  12:07 PM

## 2019-05-20 NOTE — PROGRESS NOTES
Postop day #8 transverse colectomy for near obstructing colon cancer    Subjective:  Feels pretty well.  Minimal pain.  Tolerating some diet although her appetite is not very good.  Moving around better.  Anxious to go home.    Objective:  Abdomen is soft and benign, incision looks good, positive bowel sounds    Assessment and plan:  -Metastatic colon cancer status post transverse colectomy for near obstructing cancer  -Continue with bowel regimen  -Follow-up with me next week for staple removal  -Palliative care otherwise    Sandro Barajas MD  General and Endoscopic Surgery  [facility] [place], operated by [facility]    [address], [address]  [place], [place], [address]  P: [phone]  F: [phone]

## 2019-05-20 NOTE — PROGRESS NOTES
Case Management Discharge Note    Final Note: Discharged home with [facility] 5/20/19. Family provided the transportation to home. DANIELLE Candelaria RN, CCP.     Destination      No service has been selected for the patient.      Durable Medical Equipment      No service has been selected for the patient.      Dialysis/Infusion      No service has been selected for the patient.      Home Medical Care - Selection Complete      Service Provider Request Status Selected Services Address Phone Number Fax Number    [facility] [address] [id number] [phone]      Therapy      No service has been selected for the patient.      Community Resources      No service has been selected for the patient.             Final Discharge Disposition Code: 50 - home with hospice

## 2019-05-21 ENCOUNTER — READMISSION MANAGEMENT (OUTPATIENT)
Dept: CALL CENTER | Facility: HOSPITAL | Age: 79
End: 2019-05-21

## 2019-05-21 NOTE — OUTREACH NOTE
Prep Survey      Responses   Facility patient discharged from?  [place]   Is patient eligible?  No   What are the reasons patient is not eligible?  Hospice/Pallative Care   Does the patient have one of the following disease processes/diagnoses(primary or secondary)?  Other   Prep survey completed?  Yes          Lesly Olivia RN

## 2019-05-23 LAB
CYTO UR: NORMAL
LAB AP CASE REPORT: NORMAL
LAB AP SYNOPTIC CHECKLIST: NORMAL
Lab: NORMAL
Lab: NORMAL
PATH REPORT.ADDENDUM SPEC: NORMAL
PATH REPORT.FINAL DX SPEC: NORMAL
PATH REPORT.GROSS SPEC: NORMAL

## 2019-05-29 ENCOUNTER — OFFICE VISIT (OUTPATIENT)
Dept: SURGERY | Facility: CLINIC | Age: 79
End: 2019-05-29

## 2019-05-29 VITALS — WEIGHT: 107 LBS | OXYGEN SATURATION: 98 % | HEART RATE: 82 BPM | BODY MASS INDEX: 19.57 KG/M2

## 2019-05-29 DIAGNOSIS — I26.09 OTHER PULMONARY EMBOLISM WITH ACUTE COR PULMONALE, UNSPECIFIED CHRONICITY (HCC): ICD-10-CM

## 2019-05-29 DIAGNOSIS — E46 PROTEIN-CALORIE MALNUTRITION, UNSPECIFIED SEVERITY (HCC): ICD-10-CM

## 2019-05-29 DIAGNOSIS — J96.01 ACUTE RESPIRATORY FAILURE WITH HYPOXIA (HCC): ICD-10-CM

## 2019-05-29 DIAGNOSIS — C18.4 MALIGNANT NEOPLASM OF TRANSVERSE COLON (HCC): Primary | ICD-10-CM

## 2019-05-29 DIAGNOSIS — D68.59 THROMBOPHILIA (HCC): ICD-10-CM

## 2019-05-29 DIAGNOSIS — C78.7 LIVER METASTASES: ICD-10-CM

## 2019-05-29 PROCEDURE — 99024 POSTOP FOLLOW-UP VISIT: CPT | Performed by: SURGERY

## 2019-05-30 NOTE — PROGRESS NOTES
Follow-up transverse colectomy for near obstructing and metastatic colon cancer    Subjective:  Still has some gas discomfort but overall eating better and bowels are functioning fairly well, albeit on a fairly heavy regimen of stool softeners and stimulants.    Objective:  Abdomen is soft and benign, incisions are nicely healed.    Assessment and plan:  -Metastatic near obstructing colon cancer, now status post palliative transverse colectomy, recovering well  -Patient is involved with hospice care  -Follow-up in 4 weeks, she can probably be discharged from surgical standpoint after that      Sandro Barajas MD  General and Endoscopic Surgery  [facility]    [address], [address]  [place], [place], [address]  P: [phone]  F: [phone]

## 2023-01-15 NOTE — CONSULTS
Patient Care Team:  Chelsi Danielle PA as PCP - General (Physician Assistant)  Chelsi Danielle PA as PCP - Claims Attributed      Subjective     Patient is a 78 y.o. female.  I was called by Dr. Andrew Nix to see this patient regarding pulmonary emboli.  I had previously reviewed the CT scan patient has a fair number of small to moderate size clots mostly in the subsegmental to segmental arteries.  She had an echocardiogram which showed severe RA and RV dilatation mild mitral regurgitation severe tricuspid regurgitation with an RVSP of about 55.  She currently does not feel too bad she is on 6 L nasal cannula O2 and does not feel short of breath.  She does not have any chest pains she just had acute shortness of breath this morning.  She is never had blood clots before and there is no family history of blood clots.  She has had no lower extremity pain or swelling.  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  CT scan does suggest metastases to the liver.  Otherwise patient has been incredibly healthy all of her life.  Other than battling constipation for many many years and having had some kidney stones.      Review of Systems:  Seizure strokes no recent headaches or visual changes no difficulty swallowing no bad heartburn indigestion no history of prior liver disease or hepatitis no diabetes polyuria polydipsia no heat or cold intolerance or history of thyroid disease again no history of blood clots easy bleeding or bruising she is never had any heart problems no high blood pressure no palpitations or chest pain.  She has remote kidney stones but no other kidney problems no recent dysuria hematuria      History  Past Medical History:   Diagnosis Date   • Basal cell carcinoma 2013,  2014   • Bowel obstruction (CMS/HCC)    • History of nephrolithiasis    • History of small bowel obstruction    • Hypercalcemia    • Hyperparathyroidism (CMS/HCC)    • Kidney stones    • Kidney stones    • Osteopenia    • Thyroid disease      Past Surgical History:   Procedure Laterality Date   • APPENDECTOMY  1947   • BLADDER REPAIR  2009   • COLON SURGERY  2002    PERFORATED COLON (2 SPOTS OF COLON)   • COLON SURGERY  2004    OBSTRUCTION   • COLONOSCOPY  01/2002    colon perforation  [place]   • COLONOSCOPY N/A 5/11/2019    Procedure: COLONOSCOPY to transverse colon with biopsy, tattoo;  Surgeon: Marcos Bethea MD;  Location: [facility] ENDOSCOPY;  Service: Gastroenterology   • EXPLORATORY LAPAROTOMY N/A 5/12/2019    Procedure: Transverse colon resection;  Surgeon: Sandro Barajas MD;  Location: [facility] MAIN OR;  Service: General   • HERNIA REPAIR  2009    x2   • INTESTINAL MALROTATION REPAIR  2001    TWISTED INTESTINE    • KIDNEY STONE SURGERY  2004   • OOPHORECTOMY     • SKIN CANCER EXCISION  03/24/2014    Excision basal cell carcinoma, right forehead (0.8cm) with frozen section control and layered wound closure (2.2cm)-Dr. Stuart Bajwa   • SKIN CANCER EXCISION  11/19/2013    Excision basal cell carcinoma, biopsy site upper mid forehead (0.6cm) with frozen section control and layered wound closures (1.2cm)-Dr. Stuart Huang   • THYROIDECTOMY Right 9/8/2016    Procedure: RIGHT SUPERIOR PARATHYROID ADENOMA RESECTION, RIGHT INFERIOR PARATHYROID BX;  Surgeon: Xin Melissa MD;  Location: [facility] OR [facility];  Service:    • TOTAL ABDOMINAL HYSTERECTOMY  1978    X2 SURGERIES      Social History     Socioeconomic History   • Marital status:      Spouse name: Not on file   • Number of children: Not on file   • Years of education: Not on file   • Highest education level: Not on file   Tobacco Use   • Smoking status: Current Some Day Smoker   • Smokeless tobacco: Never Used   • Tobacco comment: 1 x monthly   Substance and Sexual  Activity   • Alcohol use: No     Frequency: Never   • Drug use: No   • Sexual activity: Defer     Family History   Problem Relation Age of Onset   • Heart disease Mother    • Multiple myeloma Father    • Heart disease Father          Allergies:  Advil [ibuprofen]; Aspirin; and Metronidazole    Medications:  Prior to Admission medications    Medication Sig Start Date End Date Taking? Authorizing Provider   bisacodyl (DULCOLAX) 5 MG EC tablet Take 5 mg by mouth Daily As Needed for Constipation.   Yes Silvana Ennis MD   acetaminophen (TYLENOL) 500 MG tablet Take 500 mg by mouth every 6 (six) hours as needed for mild pain (1-3).    Silvana Ennis MD   calcitriol (ROCALTROL) 0.25 MCG capsule Begin one tablet daily of calcitriol, if you develop symptoms of numbness, tingling, or cramping despite use of calcium supplement tablets 9/8/16   Xin Melissa MD   Multiple Vitamins-Minerals (MULTIVITAMIN WITH MINERALS) tablet tablet Take 1 tablet by mouth Daily.    Silvana Ennis MD   Probiotic Product (PROBIOTIC PO) Take 1 capsule by mouth daily. Multidophilus 12, 20 billion, take 1 capsule daily    Silvana Ennis MD   Psyllium (METAMUCIL) 28.3 % powder Take  by mouth daily. 1 TSP DAILY       Silvana Ennis MD       acetaminophen 650 mg Oral Once   And      iron sucrose 300 mg Intravenous Daily   alvimopan 12 mg Oral BID   celecoxib 200 mg Oral Q12H   gabapentin 100 mg Oral Q8H   ketamine 50 mg Intravenous Once   lactobacillus acidophilus 1 capsule Oral Daily   magnesium sulfate 2 g Intravenous Once   multivitamin with minerals 1 tablet Oral Daily   sodium chloride 3 mL Intravenous Q12H       dextrose 5 % and sodium chloride 0.45 % 100 mL/hr Last Rate: 100 mL/hr (05/14/19 0336)   heparin (porcine) 18 Units/kg/hr Last Rate: 18 Units/kg/hr (05/14/19 1308)       Objective     Vital Signs  Vital Sign Min/Max for last 24 hours  Temp  Min: 97.7 °F (36.5 °C)  Max: 99.1 °F (37.3 °C)   BP  Min: 85/59  " Max: 106/66   Pulse  Min: 63  Max: 97   Resp  Min: 18  Max: 28   SpO2  Min: 85 %  Max: 98 %   Flow (L/min)  Min: 3  Max: 15   No Data Recorded       Intake/Output Summary (Last 24 hours) at 5/14/2019 1851  Last data filed at 5/14/2019 1000  Gross per 24 hour   Intake 1000 ml   Output 1250 ml   Net -250 ml     I/O this shift:  In: -   Out: 400 [Urine:400]  Last Weight and Admission Weight        05/11/19  1431   Weight: 49.7 kg (109 lb 9.1 oz)     Flowsheet Rows      First Filed Value   Admission Height  157.5 cm (62\") Documented at 05/10/2019 1025   Admission Weight  52.6 kg (116 lb) Documented at 05/10/2019 1041          Body mass index is 20.04 kg/m².           Physical Exam:  General Appearance: Well-developed white female resting in bed she does not appear in any acute distress again she is on 6 L nasal cannula to with oxygen saturations of 98%  Eyes: Conjunctiva are clear and anicteric  ENT: Mucous membranes are moist no erythema or exudates  Neck: No adenopathy no jugular venous distention at this time sitting at about 60 degrees is a little hard with her surgery to push too hard on her abdomen but I could not get any hepatojugular reflux demonstrated either.  Lungs: Clear nonlabored symmetric expansion no wheezes rales or rhonchi  Cardiac: Regular rate rhythm no murmur heart rates in the 80s to 90  Abdomen: Midline incision with a clean dry dressing abdomen is a little distended but not really tender may be a little sore when you push but no palpable masses organomegaly  : Not examined  Musculoskeletal: She has absolutely no palpable cords no swelling no pain with dorsiflexion of her legs her legs are very soft no suggestion of edema  Skin: No jaundice no petechiae  Neuro: He is alert oriented cooperative following commands  Extremities/P Vascular: No clubbing no cyanosis no edema palpable radial and dorsalis pedis pulses bilaterally  MSE: She is depressed she tells me that she knows she is dying and she " really does not have a whole lot of support.      Labs:  Results from last 7 days   Lab Units 05/14/19  0445 05/12/19  2305 05/12/19  0537 05/11/19  1206 05/10/19  1046   GLUCOSE mg/dL 105*  --  104*  --  108*   SODIUM mmol/L 141  --  145  --  142   POTASSIUM mmol/L 3.7 4.8 3.4* 3.1* 3.0*   MAGNESIUM mg/dL  --   --   --   --  2.1   CO2 mmol/L 28.6  --  27.0  --  28.7   CHLORIDE mmol/L 106  --  109*  --  98   ANION GAP mmol/L 6.4  --  9.0  --  15.3   CREATININE mg/dL 0.54*  --  0.65  --  0.82   BUN mg/dL 4*  --  5*  --  13   BUN / CREAT RATIO  7.4  --  7.7  --  15.9   CALCIUM mg/dL 7.7*  --  8.4*  --  9.1   EGFR IF NONAFRICN AM mL/min/1.73 109  --  88  --  68   ALK PHOS U/L  --   --   --   --  135*   TOTAL PROTEIN g/dL  --   --   --   --  6.6   ALT (SGPT) U/L  --   --   --   --  16   AST (SGOT) U/L  --   --   --   --  26   BILIRUBIN mg/dL  --   --   --   --  0.5   ALBUMIN g/dL  --   --   --   --  3.80   GLOBULIN gm/dL  --   --   --   --  2.8     Estimated Creatinine Clearance: 46.2 mL/min (A) (by C-G formula based on SCr of 0.54 mg/dL (L)).      Results from last 7 days   Lab Units 05/14/19  1151 05/14/19  0445 05/10/19  1046   WBC 10*3/mm3 9.03 8.06 9.01   RBC 10*6/mm3 3.23* 3.39* 4.10   HEMOGLOBIN g/dL 9.2* 9.7* 11.9*   HEMATOCRIT % 30.0* 31.0* 36.9   MCV fL 92.9 91.4 90.0   MCH pg 28.5 28.6 29.0   MCHC g/dL 30.7* 31.3* 32.2   RDW % 12.7 12.6 12.6   RDW-SD fl 43.1 41.9 41.2   MPV fL 9.6 10.1 10.0   PLATELETS 10*3/mm3 252 292 437   NEUTROPHIL % % 86.2* 83.7* 73.2   LYMPHOCYTE % % 5.5* 6.9* 12.3*   MONOCYTES % % 6.2 7.1 11.4   EOSINOPHIL % % 0.9 1.5 2.2   BASOPHIL % % 0.3 0.2 0.3   IMM GRAN % % 0.9* 0.6* 0.6*   NEUTROS ABS 10*3/mm3 7.78* 6.74 6.59   LYMPHS ABS 10*3/mm3 0.50* 0.56* 1.11   MONOS ABS 10*3/mm3 0.56 0.57 1.03*   EOS ABS 10*3/mm3 0.08 0.12 0.20   BASOS ABS 10*3/mm3 0.03 0.02 0.03   IMMATURE GRANS (ABS) 10*3/mm3 0.08* 0.05 0.05   NRBC /100 WBC 0.0 0.0 0.0     Results from last 7 days   Lab Units  05/14/19  1101   PH, ARTERIAL pH units 7.428   PO2 ART mm Hg 63.7*   PCO2, ARTERIAL mm Hg 36.3   HCO3 ART mmol/L 24.0     Results from last 7 days   Lab Units 05/10/19  1046   TROPONIN T ng/mL <0.010                 Results from last 7 days   Lab Units 05/14/19  1151 05/10/19  1046   INR  1.29* 1.03     Microbiology Results (last 10 days)     ** No results found for the last 240 hours. **            Diagnostics:  Ct Abdomen Pelvis Without Contrast    Result Date: 5/10/2019  CT SCAN OF THE ABDOMEN AND PELVIS WITHOUT CONTRAST  HISTORY: Probable liver metastases noted on recent CT scan. Large stone in right renal pelvis with obstruction. Abdominal pain and constipation.  The CT scan was performed as an emergency procedure through the abdomen and pelvis without contrast and compared to the recent enhanced CT scans of the abdomen and pelvis performed 4 days ago on 05/06/2019. The following findings are present: 1. Again noted are multiple low-density lesions scattered throughout the liver consistent with extensive metastatic disease and these measure up to 2.6 cm in size. One of these could be biopsied under CT guidance for diagnosis if necessary. 2. The lung bases are clear. The spleen, pancreas, both adrenal glands are unremarkable. There are multiple small gallstones within the gallbladder. 3. There is a large ovoid stone in the right renal pelvis measuring up to 13 mm and causing mild-to-moderate right renal obstruction that is unchanged from 4 days ago. There are several smaller nonobstructing stones scattered in the right kidney. The left kidney is unremarkable except for a small cortical cyst. 4. There is no aortic aneurysm or retroperitoneal lymphadenopathy. The large and small bowel loops are normal in caliber and show no inflammatory change. No abnormality is seen in the pelvis. 5. At bone windows, no suspicious bone lesions are seen.    Radiation dose reduction techniques were utilized, including automated  exposure control and exposure modulation based on body size.  This report was finalized on 5/10/2019 1:55 PM by Dr. Shimon Canas M.D.      Xr Chest 2 View    Result Date: 5/10/2019  PA AND LATERAL CHEST  HISTORY: Abdominal distention and bloating for 5 to 6 weeks.  COMPARISON: None.  FINDINGS: The cardiomediastinal silhouette is within normal limits. Lungs appear clear and there is no evidence for pulmonary edema or pleural effusion or infiltrate. Surgical clips overlie the right lower neck/thyroid. There is a minor scoliotic curvature of the thoracic spine.      No evidence for active disease in the chest.  This report was finalized on 5/10/2019 12:25 PM by Dr. Raj Slater M.D.      Ct Angiogram Chest With & Without Contrast    Result Date: 5/14/2019  CT ANGIOGRAM OF THE CHEST. MULTIPLE CORONAL, SAGITTAL, AND 3-D RECONSTRUCTIONS  HISTORY: 78-year-old female with hypoxia. Recently diagnosed with metastatic colon cancer.  TECHNIQUE: Radiation dose reduction techniques were utilized, including automated exposure control and exposure modulation based on body size. CT angiogram of the chest was performed following the administration of IV contrast. Multiple coronal, sagittal, and 3-D reconstruction images were obtained. There is no previous chest CT for comparison. Correlated with recent CTs of the abdomen.  FINDINGS: There are multiple bilateral pulmonary thromboemboli within segmental pulmonary arteries. The main pulmonary arteries are patent, but there are pulmonary thromboemboli within left upper lobe, left lower lobe, right upper lobe, right middle lobe, and right lower lobe pulmonary arteries and branches. The RV to LV ratio is 1.3. Since the CT of the abdomen from 05/10/2019, there has been development of atelectatic change at both lower lobes, more prominent on the right and small bilateral pleural effusions. There is a new tiny pericardial effusion. There is no lymphadenopathy within the chest.      1.  Multiple bilateral pulmonary thromboemboli within segmental pulmonary arteries and branches. The RV to LV ratio is 1.3. 2. New atelectatic change at both lower lobes and small bilateral pleural effusions. There is also a new tiny pericardial effusion.  Discussed with Dr. Michaels.  This report was finalized on 5/14/2019 4:57 PM by Dr. Em Villagran M.D.      Ct Abdomen Pelvis With Contrast    Result Date: 5/6/2019  CT ABDOMEN PELVIS W CONTRAST-  CLINICAL HISTORY: Constipation. Abdominal bloating. History of bowel obstruction. Pelvic pain.  TECHNIQUE: Spiral CT images were acquired through the abdomen and pelvis with oral and IV contrast and were reconstructed in 3 mm thick slices.  Radiation dose reduction techniques were utilized, including automated exposure control and exposure modulation based on body size.  COMPARISON: CT scan of the abdomen and pelvis dated 09/27/2013  FINDINGS: There are multiple well-circumscribed low-density masses of varying size scattered throughout both lobes of the liver consistent with hepatic metastatic disease that are new since the preceding CT scan. The largest mass is in the left lobe of the liver, and measures 2.3 cm in diameter. There are several tiny gallstones in the lumen of gallbladder. The gallbladder is otherwise unremarkable. There is a 12 x 8 mm diameter obstructing calculus at the right ureteropelvic junction that is producing moderate right hydronephrosis. A couple additional smaller nonobstructing calculi also present in the lower pole of the right kidney. The largest measures 5 mm. No calculi are noted within the left renal collecting system. Multiple small simple bilateral renal cysts are present. The kidneys are otherwise unremarkable. A grossly intact surgical anastomosis is noted within the transverse colon. The stomach and small bowel are unremarkable. The uterus is absent.      Multiple low-density solid masses of varying size scattered throughout both lobes of the  liver consistent with hepatic metastatic disease that are new since the preceding CT dated 09/27/2013. Cholelithiasis. Large approximately 12 x 8 mm diameter obstructing calculus at the right ureteropelvic junction producing moderate right hydronephrosis. A couple nonobstructing calculi also present in the lower pole of the right kidney. Bilateral simple renal cysts. Postoperative changes as noted. Otherwise unremarkable CT scan of the abdomen and pelvis.  This report was finalized on 5/6/2019 3:55 PM by Dr. Alessio Carreno M.D.      Results for orders placed during the hospital encounter of 05/10/19   Adult Transthoracic Echo Complete W/ Cont if Necessary Per Protocol    Narrative · Calculated EF = 61.0%.  · Left ventricular systolic function is normal.  · Right atrial cavity size is severely dilated.  · Right ventricular cavity is severely dilated.  · Mild mitral valve regurgitation is present  · Severe tricuspid valve regurgitation is present.  · Calculated right ventricular systolic pressure from tricuspid   regurgitation is 55 mmHg.  · Left ventricular diastolic dysfunction (grade I a) consistent with   impaired relaxation.  · There is a small (<1cm) pericardial effusion adjacent to the left   ventricle.  · Estimated right ventricular systolic pressure from tricuspid   regurgitation is markedly elevated (>55 mmHg).  · Right heart findings are consistent with cor pulmonale.          I did review the CT scan of the chest in addition to the emboli she does have tiny bilateral pleural effusions and some basilar atelectasis    Assessment/Plan     1. Acute bilateral pulmonary emboli as discussed with cardiology given the more distal location these clots or not really amenable to distraction.  Her surgery really precludes thrombolytics.  This time the best we can do is continue heparin and pray she does not bleed.  I do think we should check for any residual clots with some Dopplers if she were to have large clots then  she might be a candidate for IVC filter.  2. Colon carcinoma status post 5/12/2019 transverse colectomy for near obstructing tumor.  Probable metastatic disease with liver masses.  Oncology is already visited and discussed with the patient.  3. Acute hypoxic respiratory failure secondary to #1 wean O2 as tolerated  4. Acute cor pulmonale secondary to #1 hopefully as clots dissolve she will have a quick recovery of her RV function      Gareth Patel MD  05/14/19  6:51 PM    Time:    1 or 2

## (undated) DEVICE — ELECTRD BLD EXT EDGE/INSUL 6IN

## (undated) DEVICE — BARRIER, ABSORBABLE, ADHESION: Brand: SEPRAFILM® PROCEDURE PACK

## (undated) DEVICE — SUT SILK 3/0 SH CR5 18IN C0135

## (undated) DEVICE — SINGLE-USE BIOPSY FORCEPS: Brand: RADIAL JAW 4

## (undated) DEVICE — TUBING, SUCTION, 1/4" X 10', STRAIGHT: Brand: MEDLINE

## (undated) DEVICE — GLV SURG BIOGEL LTX PF 8 1/2

## (undated) DEVICE — THE CARR-LOCKE INJECTION NEEDLE IS A SINGLE USE, DISPOSABLE, FLEXIBLE SHEATH INJECTION NEEDLE USED FOR THE INJECTION OF VARIOUS TYPES OF MEDIA THROUGH FLEXIBLE ENDOSCOPES.

## (undated) DEVICE — Device: Brand: SPOT EX ENDOSCOPIC TATTOO

## (undated) DEVICE — DRSNG WND BORDR/ADHS NONADHR/GZ LF 4X14IN STRL

## (undated) DEVICE — SUT VIC 2/0 TIES 18IN J111T

## (undated) DEVICE — TOTAL TRAY, 16FR 10ML SIL FOLEY, URN: Brand: MEDLINE

## (undated) DEVICE — APPL CHLORAPREP W/TINT 26ML ORNG

## (undated) DEVICE — TOWEL,OR,DSP,ST,BLUE,STD,4/PK,20PK/CS: Brand: MEDLINE

## (undated) DEVICE — PK PROC MAJ 40

## (undated) DEVICE — DEV OPN LIGASURE CRV 180D 36MM 13.5CM  1P/U

## (undated) DEVICE — THE TORRENT IRRIGATION SCOPE CONNECTOR IS USED WITH THE TORRENT IRRIGATION TUBING TO PROVIDE IRRIGATION FLUIDS SUCH AS STERILE WATER DURING GASTROINTESTINAL ENDOSCOPIC PROCEDURES WHEN USED IN CONJUNCTION WITH AN IRRIGATION PUMP (OR ELECTROSURGICAL UNIT).: Brand: TORRENT

## (undated) DEVICE — Device: Brand: DEFENDO AIR/WATER/SUCTION AND BIOPSY VALVE

## (undated) DEVICE — ANTIBACTERIAL UNDYED BRAIDED (POLYGLACTIN 910), SYNTHETIC ABSORBABLE SUTURE: Brand: COATED VICRYL

## (undated) DEVICE — CANNULA,ADULT,SOFT-TOUCH,7'TUBE,UC: Brand: PENDING